# Patient Record
Sex: FEMALE | Race: WHITE | NOT HISPANIC OR LATINO | Employment: UNEMPLOYED | URBAN - METROPOLITAN AREA
[De-identification: names, ages, dates, MRNs, and addresses within clinical notes are randomized per-mention and may not be internally consistent; named-entity substitution may affect disease eponyms.]

---

## 2017-07-17 DIAGNOSIS — R10.2 PELVIC PAIN IN FEMALE: Primary | ICD-10-CM

## 2017-07-18 ENCOUNTER — TELEPHONE (OUTPATIENT)
Dept: OBSTETRICS AND GYNECOLOGY | Facility: CLINIC | Age: 42
End: 2017-07-18

## 2017-07-18 NOTE — TELEPHONE ENCOUNTER
----- Message from Rita Izaguirre sent at 7/17/2017  8:45 AM CDT -----  Contact: [phone]  Please enter order for gyn u/s and schedule appt. Appt is being held for 10 AM on Thursday 07/20.  ----- Message -----  From: Festus Benitez MD  Sent: 7/14/2017   5:06 PM  To: Rita Izaguirre    I def will take her back. Very nice ot but I do not have openings for a while. Can she come in and see Ashly and have u/s with her on a tues when I am at [facility] or Mon or thurs with Socorro when I am in met  ----- Message -----  From: Rita Izaguirre  Sent: 7/11/2017   3:15 PM  To: MD Dr Emmanuel Goldman, pt was discharged from [facility] for non-compliance and would like to come back to see you since she now has insurance that you are in network for ([facility]). Pt's last appt was 04/15/2012 for colpo, pt no-showed for LEEP and repap follow up appts. Pt thinks she has more ovarian cysts because she has been having episodes of abdominal pain that feel like it did when her previous cysts ruptured. Are you able to see her?

## 2017-07-20 ENCOUNTER — OFFICE VISIT (OUTPATIENT)
Dept: OBSTETRICS AND GYNECOLOGY | Facility: CLINIC | Age: 42
End: 2017-07-20
Payer: MEDICARE

## 2017-07-20 VITALS
HEIGHT: 64 IN | BODY MASS INDEX: 25.14 KG/M2 | DIASTOLIC BLOOD PRESSURE: 72 MMHG | WEIGHT: 147.25 LBS | SYSTOLIC BLOOD PRESSURE: 110 MMHG

## 2017-07-20 DIAGNOSIS — R10.2 PELVIC PAIN IN FEMALE: Primary | ICD-10-CM

## 2017-07-20 LAB
CANDIDA RRNA VAG QL PROBE: NEGATIVE
G VAGINALIS RRNA GENITAL QL PROBE: NEGATIVE
T VAGINALIS RRNA GENITAL QL PROBE: NEGATIVE

## 2017-07-20 PROCEDURE — 87480 CANDIDA DNA DIR PROBE: CPT

## 2017-07-20 PROCEDURE — 87660 TRICHOMONAS VAGIN DIR PROBE: CPT

## 2017-07-20 PROCEDURE — 99999 PR PBB SHADOW E&M-EST. PATIENT-LVL III: CPT | Mod: PBBFAC,,, | Performed by: NURSE PRACTITIONER

## 2017-07-20 PROCEDURE — 99213 OFFICE O/P EST LOW 20 MIN: CPT | Mod: S$GLB,,, | Performed by: NURSE PRACTITIONER

## 2017-07-20 RX ORDER — CALCIUM CARBONATE 600 MG
600 TABLET ORAL 2 TIMES DAILY WITH MEALS
COMMUNITY
End: 2018-01-22

## 2017-07-20 RX ORDER — CHOLECALCIFEROL (VITAMIN D3) 25 MCG
1000 TABLET ORAL DAILY
COMMUNITY
End: 2018-01-22

## 2017-07-20 RX ORDER — FLUTICASONE PROPIONATE 50 MCG
1 SPRAY, SUSPENSION (ML) NASAL 2 TIMES DAILY
Refills: 0 | COMMUNITY
Start: 2017-07-13 | End: 2018-01-22

## 2017-07-20 RX ORDER — IBUPROFEN 200 MG
200 TABLET ORAL EVERY 6 HOURS PRN
Status: ON HOLD | COMMUNITY
End: 2018-02-20 | Stop reason: HOSPADM

## 2017-07-20 RX ORDER — MULTIVITAMIN
1 TABLET ORAL DAILY
COMMUNITY

## 2017-07-20 RX ORDER — LEVOTHYROXINE SODIUM 75 UG/1
75 TABLET ORAL DAILY
Refills: 3 | COMMUNITY
Start: 2017-07-14 | End: 2019-10-30

## 2017-07-20 RX ORDER — TIZANIDINE HYDROCHLORIDE 4 MG/1
1 CAPSULE, GELATIN COATED ORAL NIGHTLY
Refills: 2 | COMMUNITY
Start: 2017-05-26 | End: 2018-03-01

## 2017-07-20 RX ORDER — FLUOXETINE 20 MG/1
30 TABLET ORAL DAILY
Refills: 2 | COMMUNITY
Start: 2017-07-14 | End: 2019-10-30

## 2017-07-21 ENCOUNTER — TELEPHONE (OUTPATIENT)
Dept: OBSTETRICS AND GYNECOLOGY | Facility: CLINIC | Age: 42
End: 2017-07-21

## 2017-07-21 NOTE — PROGRESS NOTES
"Call patient and tell them.....    "Your vaginal swab from the office came back negative.  This was a test for a yeast infection or a bacterial infection.  Your swab was normal.  Please call the office if you have any other questions.    "

## 2017-07-21 NOTE — TELEPHONE ENCOUNTER
"----- Message from Socorro Cuba NP sent at 7/21/2017 12:54 PM CDT -----  Call patient and tell them.....    "Your vaginal swab from the office came back negative.  This was a test for a yeast infection or a bacterial infection.  Your swab was normal.  Please call the office if you have any other questions.      "

## 2017-07-21 NOTE — TELEPHONE ENCOUNTER
Spoke with pt and informed her per Socroro Cuba NP;  Vaginal swab came back negative. Pt expressed verbal understanding. JONATHON

## 2017-07-23 NOTE — PROGRESS NOTES
Chief Complaint   Patient presents with    Pelvic Pain     Old/New Pt --  C/o Pelvic Pain  -- Last pap , Negative (Dr. Selin Anderson) (Hx of Leep w/ Dr Benitez )  --  Last MMG , Normal      (Bone pt)  Complaints/Concerns:  Yanique Mcdermott is a 42 y.o. female  presents for c/o Pelvic Pain that has been ongoing for years on/off.  She reports a history of Endometriosis.  She is currently a daily smoker (states 5-7 cigarettes/day).  Since her last visit with , she has seen Dr. Anderson due to her insurance coverage.         Patient's last menstrual period was 2017 (exact date).    Past Medical History:   Diagnosis Date    Abnormal Pap smear of cervix     Hpv (Dr Benitez)    Anemia     Anxiety     Depression     History of kidney stones     HPV (human papilloma virus) infection     LEEP Done 12 (Dr Benitez)    Hx of thyroid nodule     Thyroid disease     Hypo       Past Surgical History:   Procedure Laterality Date    BREAST AUGMENTATION      CERVICAL BIOPSY  W/ LOOP ELECTRODE EXCISION  2012    Dr Benitez    PELVIC LAPAROSCOPY      THYROIDECTOMY, PARTIAL      WISDOM TOOTH EXTRACTION         OB History    Para Term  AB Living   2 2 2     2   SAB TAB Ectopic Multiple Live Births           2      # Outcome Date GA Lbr Burt/2nd Weight Sex Delivery Anes PTL Lv   2 Term 07 38w0d  3.742 kg (8 lb 4 oz) M Vag-Spont EPI  JENNIFER   1 Term 04 38w0d  4.508 kg (9 lb 15 oz) M Vag-Spont EPI  JENNIFER          Family History   Problem Relation Age of Onset    Prostate cancer Paternal Grandfather     Cancer Paternal Grandfather     Breast cancer Maternal Grandmother     Cancer Maternal Grandmother     Alzheimer's disease Maternal Grandmother     Colon cancer Maternal Grandfather     Cancer Maternal Grandfather     Pancreatic cancer Maternal Grandfather     Cancer Mother     Brain cancer Mother     Ovarian cancer Neg Hx        Social  "History   Substance Use Topics    Smoking status: Current Every Day Smoker    Smokeless tobacco: Current User    Alcohol use Yes      Comment: Social       /72   Ht 5' 4" (1.626 m)   Wt 66.8 kg (147 lb 4.3 oz)   LMP 07/01/2017 (Exact Date)   BMI 25.28 kg/m²     ROS:    Review of Systems   Constitutional: Negative for chills and fever.   Gastrointestinal: Negative for nausea and vomiting.   Genitourinary: Positive for pelvic pain. Negative for vaginal bleeding and vaginal pain.       Physical Exam:    Physical Exam:   Constitutional: She is oriented to person, place, and time. She appears well-developed and well-nourished.        Pulmonary/Chest: Effort normal.        Abdominal: Soft. Normal appearance. She exhibits no distension and no mass. There is no tenderness. There is no rigidity, no rebound and no guarding.     Genitourinary: Vagina normal and uterus normal. Pelvic exam was performed with patient supine. There is no rash, tenderness, lesion or injury on the right labia. There is no rash, tenderness, lesion or injury on the left labia. Uterus is not enlarged and not tender. Cervix is normal. Right adnexum displays no mass, no tenderness and no fullness. Left adnexum displays no mass, no tenderness and no fullness. No erythema, tenderness or bleeding in the vagina. No foreign body in the vagina. No signs of injury around the vagina. No vaginal discharge found. Labial bartholins normal.Cervix exhibits no motion tenderness, no discharge and no friability. Also,  no recent pap smear                Neurological: She is oriented to person, place, and time.    Skin: Skin is warm and dry.    Psychiatric: She has a normal mood and affect. Her behavior is normal.       ASSESSMENT AND PLAN  Pelvic pain in female  -     Vaginosis Screen by DNA Probe    U/S report today:  "Nonspecific echogenic foci in the endometrium that may be related to small calcifications. Correlation advised. Followup as clinically " "warranted." (reviewed results with patient)    Patient was counseled today on A.C.S. Pap guidelines and recommendations for yearly pelvic exams, mammograms and monthly self breast exams; to see her PCP for other health maintenance.     Return in about 8 weeks (around 9/14/2017) for Follow-Up/Consult to discuss possible HYST.      "

## 2017-09-27 ENCOUNTER — TELEPHONE (OUTPATIENT)
Dept: OBSTETRICS AND GYNECOLOGY | Facility: CLINIC | Age: 42
End: 2017-09-27

## 2017-09-27 ENCOUNTER — INITIAL CONSULT (OUTPATIENT)
Dept: OBSTETRICS AND GYNECOLOGY | Facility: CLINIC | Age: 42
End: 2017-09-27
Attending: OBSTETRICS & GYNECOLOGY
Payer: MEDICARE

## 2017-09-27 VITALS
BODY MASS INDEX: 25.03 KG/M2 | SYSTOLIC BLOOD PRESSURE: 110 MMHG | WEIGHT: 146.63 LBS | HEIGHT: 64 IN | DIASTOLIC BLOOD PRESSURE: 88 MMHG

## 2017-09-27 DIAGNOSIS — R10.2 PELVIC PAIN IN FEMALE: ICD-10-CM

## 2017-09-27 DIAGNOSIS — N39.3 STRESS INCONTINENCE, FEMALE: ICD-10-CM

## 2017-09-27 DIAGNOSIS — Z11.51 SCREENING FOR HUMAN PAPILLOMAVIRUS: Primary | ICD-10-CM

## 2017-09-27 DIAGNOSIS — Z12.39 BREAST CANCER SCREENING: ICD-10-CM

## 2017-09-27 DIAGNOSIS — Z01.419 WELL FEMALE EXAM WITH ROUTINE GYNECOLOGICAL EXAM: ICD-10-CM

## 2017-09-27 DIAGNOSIS — F17.200 SMOKER: ICD-10-CM

## 2017-09-27 DIAGNOSIS — B97.7 HPV (HUMAN PAPILLOMA VIRUS) INFECTION: ICD-10-CM

## 2017-09-27 DIAGNOSIS — Z01.419 ENCOUNTER FOR GYNECOLOGICAL EXAMINATION: ICD-10-CM

## 2017-09-27 PROCEDURE — 87624 HPV HI-RISK TYP POOLED RSLT: CPT

## 2017-09-27 PROCEDURE — 99396 PREV VISIT EST AGE 40-64: CPT | Mod: S$GLB,,, | Performed by: OBSTETRICS & GYNECOLOGY

## 2017-09-27 PROCEDURE — 99999 PR PBB SHADOW E&M-EST. PATIENT-LVL III: CPT | Mod: PBBFAC,,, | Performed by: OBSTETRICS & GYNECOLOGY

## 2017-09-27 PROCEDURE — 88175 CYTOPATH C/V AUTO FLUID REDO: CPT

## 2017-09-27 NOTE — PROGRESS NOTES
CC: Well woman exam    Yanique Mcdermott is a 42 y.o. female  presents for a well woman exam.  She is established.  LMP: Patient's last menstrual period was 2017..   Last pap 2 years ago with .   c/o pelvic pain, extreme abdominal pain.  Periods are becoming more painful and lasting 7 days.     Patient is a 42-year-old  with normal monthly cycles every 26-30 days.  Lasting 4-6 days.  She does report dysmenorrhea with her cycle every month.  Patient with a history of endometriosis and had been doing well until the past year.  Over the past year that only she having pain with her cycles but she also has pain randomly throughout the month.  The pain usually starts in the back and radiates to the front does not seem to be related to her cycle at all.  It comes in waves and usually lasts approximately an hour.  Approximately 1-1/2 months ago she went to [facility] as the pain was so severe.  CAT scan was negative.  Lab work and urine culture was negative at that time.  She also does have a history of kidney stones her last episode was 5 years ago.  Patient is currently not on any any contraceptives secondary to her smoking status.  Patient reports that she again had another episode of pain which was significant month and came to see us here in the clinic and had a transvaginal ultrasound which was negative.  She states the pain feels like she is having recurrent cysts and like her old endometriosis pain.  She denies any GI symptoms no constipation no diarrhea no other bowel or gas pains.   she does report stress urinary incontinence and occasional urinary frequency however this is been going on for years.     Health Maintenance   Topic Date Due    Lipid Panel  1973    TETANUS VACCINE  1991    Pneumococcal PPSV23 (Medium Risk) (1) 1991    Pap Smear with HPV Cotest  1994    Mammogram  2013    Influenza Vaccine  2017         Past Medical History:   Diagnosis  "Date    Abnormal Pap smear of cervix 2010    Hpv (Dr Benitez)    Anemia     Anxiety     Depression     History of kidney stones     HPV (human papilloma virus) infection 2010    LEEP Done 12 (Dr Benitez)    Hx of thyroid nodule     Thyroid disease     Hypo       Past Surgical History:   Procedure Laterality Date    BREAST AUGMENTATION      CERVICAL BIOPSY  W/ LOOP ELECTRODE EXCISION  2012    Dr Benitez    PELVIC LAPAROSCOPY  2000    THYROIDECTOMY, PARTIAL      WISDOM TOOTH EXTRACTION         OB History    Para Term  AB Living   2 2 2     2   SAB TAB Ectopic Multiple Live Births           2      # Outcome Date GA Lbr Burt/2nd Weight Sex Delivery Anes PTL Lv   2 Term 07 38w0d  3.742 kg (8 lb 4 oz) M Vag-Spont EPI  JENNIFER   1 Term 04 38w0d  4.508 kg (9 lb 15 oz) M Vag-Spont EPI  JENNIFER          Family History   Problem Relation Age of Onset    Prostate cancer Paternal Grandfather     Cancer Paternal Grandfather     Breast cancer Maternal Grandmother     Cancer Maternal Grandmother     Alzheimer's disease Maternal Grandmother     Colon cancer Maternal Grandfather     Cancer Maternal Grandfather     Pancreatic cancer Maternal Grandfather     Cancer Mother     Brain cancer Mother     Ovarian cancer Neg Hx        Social History   Substance Use Topics    Smoking status: Current Every Day Smoker    Smokeless tobacco: Current User    Alcohol use Yes      Comment: Social       /88   Ht 5' 4" (1.626 m)   Wt 66.5 kg (146 lb 9.7 oz)   LMP 2017   BMI 25.16 kg/m²       ROS:  GENERAL: Denies weight gain or weight loss. Feeling well overall.   SKIN: Denies rash or lesions.   HEAD: Denies head injury or headache.   NODES: Denies enlarged lymph nodes.   CHEST: Denies chest pain or shortness of breath.   CARDIOVASCULAR: Denies palpitations or left sided chest pain.   ABDOMEN: No abdominal pain, constipation, diarrhea, nausea, vomiting or rectal bleeding. "   URINARY: No frequency, dysuria, hematuria, or burning on urination.  REPRODUCTIVE: See HPI.   BREASTS: The patient performs breast self-examination and denies pain, lumps, or nipple discharge.   HEMATOLOGIC: No easy bruisability or excessive bleeding.  MUSCULOSKELETAL: Denies joint pain or swelling.   NEUROLOGIC: Denies syncope or weakness.   PSYCHIATRIC: Denies depression, anxiety or mood swings.    Physical Exam:    APPEARANCE: Well nourished, well developed, in no acute distress.  AFFECT: WNL, alert and oriented x 3  SKIN: No acne or hirsutism  ABDOMEN: Soft.  No tenderness or masses.  No hepatosplenomegaly.  No hernias.  BREASTS: Symmetrical, no skin changes or visible lesions.  No palpable masses, nipple discharge bilaterally.  PELVIC: Normal external genitalia without lesions.  Normal hair distribution.  Adequate perineal body, normal urethral meatus.  Vagina moist and well rugated without lesions or discharge.  Cervix pink, without lesions, discharge or tenderness.  No significant cystocele or rectocele.  Bimanual exam shows uterus to be normal size, regular, mobile and nontender.  Adnexa without masses or tenderness.    EXTREMITIES: No edema.    ASSESSMENT AND PLAN  1. Screening for human papillomavirus  Liquid-based pap smear, screening    HPV High Risk Genotypes, PCR   2. Well female exam with routine gynecological exam  Liquid-based pap smear, screening    HPV High Risk Genotypes, PCR   3. HPV (human papilloma virus) infection  Liquid-based pap smear, screening    HPV High Risk Genotypes, PCR   4. Encounter for gynecological examination   Pap smear performed today.     5. Stress incontinence, female  Ambulatory Referral to Urogynecology  Referred to Dr. Dr. Boykin for  stress urinary incontinence evaluation    6. Breast cancer screening  Mammo Digital Screening Bilat with Tomosynthesis CAD   7. Smoker   referred to smoking cessation clinic    8. Pelvic pain in female   long discussion with patient.   Patient with a history of endometriosis.  Reports that the pain has gotten worse over the past year and is not only having pain with her cycle but pain randomly throughout the month.  Patient not a candidate for trial of oral contraceptives.  Pelvic ultrasound performed and was normal as well as a CT scan of abdomen and pelvis was also normal.  Likelihood of recurrent endometriosis is high.  Discussed pros and cons and patient would like to proceed with diagnostic laparoscopy.  Urine culture was previously negative at  as well as CAT scan and labs were 2..         Patient was counseled today on A.C.S. Pap guidelines and recommendations for yearly pelvic exams, mammograms and monthly self breast exams; to see her PCP for other health maintenance.     Return for Pre-op appointment.

## 2017-09-27 NOTE — TELEPHONE ENCOUNTER
Patient is interested in proceeding with laparoscopy.  Diagnoses pelvic pain and history of endometriosis.

## 2017-10-02 LAB
HPV HR 12 DNA CVX QL NAA+PROBE: NEGATIVE
HPV16 DNA SPEC QL NAA+PROBE: NEGATIVE
HPV18 DNA SPEC QL NAA+PROBE: NEGATIVE

## 2017-10-06 ENCOUNTER — TELEPHONE (OUTPATIENT)
Dept: OBSTETRICS AND GYNECOLOGY | Facility: CLINIC | Age: 42
End: 2017-10-06

## 2017-10-06 NOTE — TELEPHONE ENCOUNTER
Called pt. To tell her pap & hpv negative. Patient waiting for Tia to call her about scheduling surgery. I told her she should call her by Monday

## 2017-11-14 ENCOUNTER — TELEPHONE (OUTPATIENT)
Dept: OBSTETRICS AND GYNECOLOGY | Facility: CLINIC | Age: 42
End: 2017-11-14

## 2017-11-14 DIAGNOSIS — Z87.42 HISTORY OF ENDOMETRIOSIS: ICD-10-CM

## 2017-11-14 DIAGNOSIS — R10.2 PELVIC PAIN IN FEMALE: Primary | ICD-10-CM

## 2017-12-04 ENCOUNTER — TELEPHONE (OUTPATIENT)
Dept: OBSTETRICS AND GYNECOLOGY | Facility: CLINIC | Age: 42
End: 2017-12-04

## 2017-12-04 NOTE — TELEPHONE ENCOUNTER
Pt was calling to speak to you, she does not know where she put the codes you gave her for her surgery.

## 2017-12-05 NOTE — TELEPHONE ENCOUNTER
Samina. Pt has questions about recovery time, can she have procedure if she is on her period and no one from uro gyn has called her for an appointment.

## 2017-12-07 ENCOUNTER — TELEPHONE (OUTPATIENT)
Dept: OBSTETRICS AND GYNECOLOGY | Facility: CLINIC | Age: 42
End: 2017-12-07

## 2017-12-07 NOTE — TELEPHONE ENCOUNTER
Pt. Cancelled her surgery for this month since she has not had frequent pains like she was.   Also worried about going under general anesthesia. States she knows she needs this in order to move forward but wants to r/s for January.   Has not seen  yet.

## 2018-01-20 ENCOUNTER — NURSE TRIAGE (OUTPATIENT)
Dept: ADMINISTRATIVE | Facility: CLINIC | Age: 43
End: 2018-01-20

## 2018-01-20 DIAGNOSIS — R10.2 PELVIC PAIN IN FEMALE: Primary | ICD-10-CM

## 2018-01-20 NOTE — TELEPHONE ENCOUNTER
Due to patients description of her pain,fainting,loss of sensation I used [employer] I asked patient to dial 911.

## 2018-01-20 NOTE — TELEPHONE ENCOUNTER
Reason for Disposition   Passed out (i.e., lost consciousness, collapsed and was not responding)    Protocols used: ST ABDOMINAL PAIN - FEMALE-A-AH

## 2018-01-22 ENCOUNTER — OFFICE VISIT (OUTPATIENT)
Dept: OBSTETRICS AND GYNECOLOGY | Facility: CLINIC | Age: 43
End: 2018-01-22
Payer: MEDICARE

## 2018-01-22 ENCOUNTER — TELEPHONE (OUTPATIENT)
Dept: OBSTETRICS AND GYNECOLOGY | Facility: CLINIC | Age: 43
End: 2018-01-22

## 2018-01-22 VITALS
DIASTOLIC BLOOD PRESSURE: 80 MMHG | HEIGHT: 64 IN | SYSTOLIC BLOOD PRESSURE: 110 MMHG | BODY MASS INDEX: 24.74 KG/M2 | WEIGHT: 144.94 LBS

## 2018-01-22 DIAGNOSIS — R10.30 LOWER ABDOMINAL PAIN: Primary | ICD-10-CM

## 2018-01-22 PROCEDURE — 99999 PR PBB SHADOW E&M-EST. PATIENT-LVL III: CPT | Mod: PBBFAC,,, | Performed by: OBSTETRICS & GYNECOLOGY

## 2018-01-22 PROCEDURE — 99214 OFFICE O/P EST MOD 30 MIN: CPT | Mod: S$GLB,,, | Performed by: OBSTETRICS & GYNECOLOGY

## 2018-01-22 RX ORDER — ERGOCALCIFEROL 1.25 MG/1
CAPSULE ORAL
Refills: 3 | COMMUNITY
Start: 2017-10-19 | End: 2021-03-11

## 2018-01-22 RX ORDER — IBUPROFEN 200 MG
TABLET ORAL
Refills: 3 | COMMUNITY
Start: 2017-12-07 | End: 2019-01-03

## 2018-01-22 RX ORDER — ECONAZOLE NITRATE 10 MG/G
CREAM TOPICAL
Refills: 3 | COMMUNITY
Start: 2017-11-06 | End: 2019-01-03

## 2018-01-22 RX ORDER — CLONAZEPAM 0.5 MG/1
0.5 TABLET ORAL DAILY PRN
Refills: 0 | COMMUNITY
Start: 2017-10-19 | End: 2019-01-03

## 2018-01-22 NOTE — TELEPHONE ENCOUNTER
Please call patient and schedule/reschedule her laparoscopy.  Diagnoses chronic pelvic pain with a history of endometriosis  Please put Van warmer or Schex to assist  Anesthesia Gen.

## 2018-01-22 NOTE — PROGRESS NOTES
CC: Acute episode of abdominal pain on Saturday.    Yanique Mcdermott is a 42 y.o. female    Patient with a history of endometriosis was scheduled for surgery last month but canceled secondary to the pain had improved significantly.  2 days ago patient went to the pharmacy and while she was sitting in the car experienced an episode of acute abdominal pain across her entire lower abdomen.  The pain was so intense and so severe that it made her pass out for a few moments.  Last menstrual period was on .  Patient called the nurse on call and the pain had been subsided.  She was recommended to go to the emergency room but the patient did not go.   We called to check on patient this morning when we got the message and instructed her to come in today for an ultrasound.  Ultrasound today was essentially normal with a small echogenic focus 4 mm and it in the endometrium which could represent a small fibroid.  Both ovaries were normal bilaterally with no evidence of cyst and no free fluid.  Endometrial stripe 5 mm.      She is established.   Again we had a long discussion today regarding surgery.  I am unclear the source of the pain as this would be an atypical presentation for endometriosis.  The patient does not have daily or throbbing pain.  She does not tend to have pain while she is on her period but the pain tends to occur days after her period and the attack is acute and intense and short.  This is the same pain that she had years ago when she was diagnosed with endometriosis by another physician in .  Again I stated that the only way to tell for sure is to proceed with another laparoscopy.  Patient agreeable.  I also stated that I'm concerned that because of the atypical nature of this pain that we might be missing something else that could be causing the pain.    Past Medical History:   Diagnosis Date    Abnormal Pap smear of cervix     Hpv (Dr Benitez)    Anemia     Anxiety      "Depression     History of kidney stones     HPV (human papilloma virus) infection 2010    LEEP Done 12 (Dr Benitez)    Hx of thyroid nodule     Pelvic pain     Thyroid disease     Hypo    Tobacco use        Past Surgical History:   Procedure Laterality Date    BREAST AUGMENTATION      CERVICAL BIOPSY  W/ LOOP ELECTRODE EXCISION  2012    Dr Benitez    PELVIC LAPAROSCOPY      THYROIDECTOMY, PARTIAL      WISDOM TOOTH EXTRACTION         OB History    Para Term  AB Living   2 2 2     2   SAB TAB Ectopic Multiple Live Births           2      # Outcome Date GA Lbr Burt/2nd Weight Sex Delivery Anes PTL Lv   2 Term 07 38w0d  3.742 kg (8 lb 4 oz) M Vag-Spont EPI  JENNIFER   1 Term 04 38w0d  4.508 kg (9 lb 15 oz) M Vag-Spont EPI  JENNIFER      Obstetric Comments   Menarche 12       Family History   Problem Relation Age of Onset    Prostate cancer Paternal Grandfather     Cancer Paternal Grandfather     Breast cancer Maternal Grandmother     Cancer Maternal Grandmother     Alzheimer's disease Maternal Grandmother     Colon cancer Maternal Grandfather     Cancer Maternal Grandfather     Pancreatic cancer Maternal Grandfather     Cancer Mother     Brain cancer Mother     Ovarian cancer Neg Hx        Social History   Substance Use Topics    Smoking status: Current Every Day Smoker     Types: Cigarettes    Smokeless tobacco: Never Used    Alcohol use Yes      Comment: Social       /80   Ht 5' 4" (1.626 m)   Wt 65.8 kg (144 lb 15.2 oz)   LMP 2018 (Exact Date)   BMI 24.88 kg/m²     ROS:  GENERAL: Denies weight gain or weight loss. Feeling well overall.   SKIN: Denies rash or lesions.   HEAD: Denies head injury or headache.   NODES: Denies enlarged lymph nodes.   CHEST: Denies chest pain or shortness of breath.   CARDIOVASCULAR: Denies palpitations or left sided chest pain.   ABDOMEN: No constipation, diarrhea, nausea, vomiting or rectal bleeding. "   URINARY: No frequency, dysuria, hematuria, or burning on urination.  REPRODUCTIVE: See HPI.   BREASTS: denies pain, lumps, or nipple discharge.   HEMATOLOGIC: No easy bruisability or excessive bleeding.  MUSCULOSKELETAL: Denies joint pain or swelling.   NEUROLOGIC: Denies syncope or weakness.   PSYCHIATRIC: Denies depression, anxiety or mood swings.    Physical Exam:    APPEARANCE: Well nourished, well developed, in no acute distress.  AFFECT: WNL, alert and oriented x 3  SKIN: No acne or hirsutism  NECK: Neck symmetric without masses or thyromegaly  NODES: No inguinal, cervical, axillary, or femoral lymph node enlargement  CHEST: Good respiratory effect  ABDOMEN: Soft.  No tenderness or masses.  No hepatosplenomegaly.  No hernias.  PELVIC: Normal external genitalia without lesions.  Normal hair distribution.  Adequate perineal body, normal urethral meatus.  Vagina moist and well rugated without lesions or discharge.  Cervix pink, without lesions, discharge or tenderness.  No significant cystocele or rectocele.  Bimanual exam shows uterus to be normal size, regular, mobile and nontender.  Adnexa without masses or tenderness.    EXTREMITIES: No edema.    ASSESSMENT AND PLAN    Yanique was seen today for pelvic pain.    Diagnoses and all orders for this visit:    Lower abdominal pain   Ideology unclear.  Will proceed with rescheduling laparoscopy surgery.      No Follow-up on file.

## 2018-01-22 NOTE — TELEPHONE ENCOUNTER
Pt states she feels like she has a cyst that ruptured.  She went to the pharmacy and passed out in her car due to the pain. Also reports a tingling in arms and hands after.  She just got off her period but when she wiped after using the bathroom she had a scant amount of blood.  She isn't sure if its due to her period or the suspected ruptured cyst.  Today she is still having discomfort and swelling, using a heating pad and Motrin for comfort.       She did not go to the ER.      She wants to be seen today.     Do you want an US?

## 2018-01-26 ENCOUNTER — TELEPHONE (OUTPATIENT)
Dept: OBSTETRICS AND GYNECOLOGY | Facility: CLINIC | Age: 43
End: 2018-01-26

## 2018-01-26 DIAGNOSIS — R10.2 FEMALE PELVIC PAIN: Primary | ICD-10-CM

## 2018-01-26 DIAGNOSIS — Z87.42 HISTORY OF ENDOMETRIOSIS: ICD-10-CM

## 2018-02-07 ENCOUNTER — OFFICE VISIT (OUTPATIENT)
Dept: OBSTETRICS AND GYNECOLOGY | Facility: CLINIC | Age: 43
End: 2018-02-07
Attending: OBSTETRICS & GYNECOLOGY
Payer: MEDICARE

## 2018-02-07 ENCOUNTER — ANESTHESIA EVENT (OUTPATIENT)
Dept: SURGERY | Facility: OTHER | Age: 43
End: 2018-02-07
Payer: MEDICARE

## 2018-02-07 ENCOUNTER — HOSPITAL ENCOUNTER (OUTPATIENT)
Dept: PREADMISSION TESTING | Facility: OTHER | Age: 43
Discharge: HOME | End: 2018-02-07
Attending: OBSTETRICS & GYNECOLOGY
Payer: MEDICARE

## 2018-02-07 VITALS
BODY MASS INDEX: 24.41 KG/M2 | DIASTOLIC BLOOD PRESSURE: 65 MMHG | HEART RATE: 84 BPM | SYSTOLIC BLOOD PRESSURE: 109 MMHG | OXYGEN SATURATION: 97 % | TEMPERATURE: 99 F | HEIGHT: 64 IN | WEIGHT: 143 LBS

## 2018-02-07 VITALS — HEIGHT: 64 IN | BODY MASS INDEX: 24.46 KG/M2 | WEIGHT: 143.31 LBS

## 2018-02-07 DIAGNOSIS — Z01.818 PRE-OP EXAMINATION: ICD-10-CM

## 2018-02-07 DIAGNOSIS — R10.2 PELVIC PAIN: Primary | ICD-10-CM

## 2018-02-07 LAB
BASOPHILS # BLD AUTO: 0.02 K/UL
BASOPHILS NFR BLD: 0.4 %
DIFFERENTIAL METHOD: NORMAL
EOSINOPHIL # BLD AUTO: 0.2 K/UL
EOSINOPHIL NFR BLD: 3.5 %
ERYTHROCYTE [DISTWIDTH] IN BLOOD BY AUTOMATED COUNT: 13.5 %
HCT VFR BLD AUTO: 37.2 %
HGB BLD-MCNC: 12.5 G/DL
LYMPHOCYTES # BLD AUTO: 1.8 K/UL
LYMPHOCYTES NFR BLD: 32.6 %
MCH RBC QN AUTO: 30.2 PG
MCHC RBC AUTO-ENTMCNC: 33.6 G/DL
MCV RBC AUTO: 90 FL
MONOCYTES # BLD AUTO: 0.4 K/UL
MONOCYTES NFR BLD: 7.1 %
NEUTROPHILS # BLD AUTO: 3.2 K/UL
NEUTROPHILS NFR BLD: 56.2 %
PLATELET # BLD AUTO: 324 K/UL
PMV BLD AUTO: 9.3 FL
RBC # BLD AUTO: 4.14 M/UL
WBC # BLD AUTO: 5.64 K/UL

## 2018-02-07 PROCEDURE — 36415 COLL VENOUS BLD VENIPUNCTURE: CPT

## 2018-02-07 PROCEDURE — 85025 COMPLETE CBC W/AUTO DIFF WBC: CPT

## 2018-02-07 PROCEDURE — 99499 UNLISTED E&M SERVICE: CPT | Mod: S$GLB,,, | Performed by: OBSTETRICS & GYNECOLOGY

## 2018-02-07 PROCEDURE — 99999 PR PBB SHADOW E&M-EST. PATIENT-LVL III: CPT | Mod: PBBFAC,,, | Performed by: OBSTETRICS & GYNECOLOGY

## 2018-02-07 RX ORDER — ALBUTEROL SULFATE 0.83 MG/ML
2.5 SOLUTION RESPIRATORY (INHALATION)
Status: CANCELLED | OUTPATIENT
Start: 2018-02-07 | End: 2018-02-07

## 2018-02-07 RX ORDER — MIDAZOLAM HYDROCHLORIDE 1 MG/ML
5 INJECTION INTRAMUSCULAR; INTRAVENOUS
Status: DISCONTINUED | OUTPATIENT
Start: 2018-02-20 | End: 2018-02-08 | Stop reason: HOSPADM

## 2018-02-07 RX ORDER — LIDOCAINE HYDROCHLORIDE 10 MG/ML
0.5 INJECTION, SOLUTION EPIDURAL; INFILTRATION; INTRACAUDAL; PERINEURAL ONCE
Status: CANCELLED | OUTPATIENT
Start: 2018-02-07 | End: 2018-02-07

## 2018-02-07 RX ORDER — SCOLOPAMINE TRANSDERMAL SYSTEM 1 MG/1
1 PATCH, EXTENDED RELEASE TRANSDERMAL
Status: CANCELLED | OUTPATIENT
Start: 2018-02-07

## 2018-02-07 RX ORDER — OXYCODONE AND ACETAMINOPHEN 5; 325 MG/1; MG/1
1 TABLET ORAL EVERY 4 HOURS PRN
Qty: 10 TABLET | Refills: 0 | Status: SHIPPED | OUTPATIENT
Start: 2018-02-07 | End: 2018-03-01

## 2018-02-07 RX ORDER — IBUPROFEN 800 MG/1
800 TABLET ORAL EVERY 8 HOURS PRN
Qty: 20 TABLET | Refills: 0 | Status: SHIPPED | OUTPATIENT
Start: 2018-02-07 | End: 2022-09-01

## 2018-02-07 RX ORDER — SODIUM CHLORIDE, SODIUM LACTATE, POTASSIUM CHLORIDE, CALCIUM CHLORIDE 600; 310; 30; 20 MG/100ML; MG/100ML; MG/100ML; MG/100ML
INJECTION, SOLUTION INTRAVENOUS CONTINUOUS
Status: CANCELLED | OUTPATIENT
Start: 2018-02-07

## 2018-02-07 NOTE — DISCHARGE INSTRUCTIONS
PRE-ADMIT TESTING -  [phone]    [address]  [facility]          Your surgery has been scheduled at [facility]. We are pleased to have the opportunity to serve you. For Further Information please call [phone].    On the day of surgery please report to the Information Desk on the 1st floor.    · CONTACT YOUR PHYSICIAN'S OFFICE THE DAY PRIOR TO YOUR SURGERY TO OBTAIN YOUR ARRIVAL TIME.     · The evening before surgery do not eat anything after 9 p.m. ( this includes hard candy, chewing gum and mints).  You may only have GATORADE, POWERADE AND WATER  from 9 p.m. until you leave your home.   DO NOT DRINK ANY LIQUIDS ON THE WAY TO THE HOSPITAL.      SPECIAL MEDICATION INSTRUCTIONS: TAKE medications checked off by the Anesthesiologist on your Medication List.    Angiogram Patients: Take medications as instructed by your physician, including aspirin.     Surgery Patients:    If you take ASPIRIN - Your PHYSICIAN/SURGEON will need to inform you IF/OR when you need to stop taking aspirin prior to your surgery.     Do Not take any medications containing IBUPROFEN.  Do Not Wear any make-up or dark nail polish   (especially eye make-up) to surgery. If you come to surgery with makeup on you will be required to remove the makeup or nail polish.    Do not shave your surgical area at least 5 days prior to your surgery. The surgical prep will be performed at the hospital according to Infection Control regulations.    Leave all valuables at home.   Do Not wear any jewelry or watches, including any metal in body piercings.  Contact Lens must be removed before surgery. Either do not wear the contact lens or bring a case and solution for storage.  Please bring a container for eyeglasses or dentures as required.  Bring any paperwork your physician has provided, such as consent forms,  history and physicals, doctor's orders, etc.   Bring comfortable clothes that are loose fitting to wear upon  discharge. Take into consideration the type of surgery being performed.  Maintain your diet as advised per your physician the day prior to surgery.      Adequate rest the night before surgery is advised.   Park in the Parking lot behind the hospital or in the [place] [facility] across the street from the parking lot. Parking is complimentary.  If you will be discharged the same day as your procedure, please arrange for a responsible adult to drive you home or to accompany you if traveling by taxi.   YOU WILL NOT BE PERMITTED TO DRIVE OR TO LEAVE THE HOSPITAL ALONE AFTER SURGERY.   It is strongly recommended that you arrange for someone to remain with you for the first 24 hrs following your surgery.       Thank you for your cooperation.  The Staff of [facility].        Bathing Instructions                                                                 Please shower the evening before and morning of your procedure with    ANTIBACTERIAL SOAP. ( DIAL, etc )  Concentrate on the surgical area   for at least 3 minutes and rinse completely. Dry off as usual.   Do not use any deodorant, powder, body lotions, perfume, after shave or    cologne.

## 2018-02-07 NOTE — PROGRESS NOTES
CC:Pelvic pain     Yanique Tremayne Mcdermott is a 42 y.o. female  here for preop     Patient with a history of endometriosis was scheduled for surgery last month but canceled secondary to the pain had improved significantly.  2 days ago patient went to the pharmacy and while she was sitting in the car experienced an episode of acute abdominal pain across her entire lower abdomen.  The pain was so intense and so severe that it made her pass out for a few moments.  Last menstrual period 2018 .  Patient called the nurse on call and the pain had been subsided.    She was recommended to go to the emergency room but the patient did not go.   We called to check on patient this morning when we got the message and instructed her to come in today for an ultrasound.  Ultrasound today was essentially normal with a small echogenic focus 4 mm and it in the endometrium which could represent a small fibroid.  Both ovaries were normal bilaterally with no evidence of cyst and no free fluid.  Endometrial stripe 5 mm.      Again we had a long discussion today regarding surgery.  I am unclear the source of the pain as this would be an atypical presentation for endometriosis.  The patient does not have daily or throbbing pain.  She does not tend to have pain while she is on her period but the pain tends to occur days after her period and the attack is acute and intense and short.  This is the same pain that she had years ago when she was diagnosed with endometriosis by another physician in .  Again I stated that the only way to tell for sure is to proceed with another laparoscopy.  Patient agreeable.  I also stated that I'm concerned that because of the atypical nature of this pain that we might be missing something else that could be causing the pain.          Past Medical History:   Diagnosis Date    Abnormal Pap smear of cervix      Hpv (Dr Benitez)    Anemia      Anxiety      Depression      History of kidney  stones      HPV (human papilloma virus) infection      LEEP Done 12 (Dr Benitez)    Hx of thyroid nodule     Pelvic pain      Thyroid disease       Hypo    Tobacco use                 Past Surgical History:   Procedure Laterality Date    BREAST AUGMENTATION       CERVICAL BIOPSY  W/ LOOP ELECTRODE EXCISION   2012     Dr Benitez    PELVIC LAPAROSCOPY   2000    THYROIDECTOMY, PARTIAL       WISDOM TOOTH EXTRACTION                             OB History    Para Term  AB Living   2 2 2     2   SAB TAB Ectopic Multiple Live Births           2       # Outcome Date GA Lbr Burt/2nd Weight Sex Delivery Anes PTL Lv   2 Term 07 38w0d   3.742 kg (8 lb 4 oz) M Vag-Spont EPI   JENNIFER   1 Term 04 38w0d   4.508 kg (9 lb 15 oz) M Vag-Spont EPI   JENNIFER       Obstetric Comments   Menarche 12               Family History   Problem Relation Age of Onset    Prostate cancer Paternal Grandfather      Cancer Paternal Grandfather      Breast cancer Maternal Grandmother      Cancer Maternal Grandmother      Alzheimer's disease Maternal Grandmother      Colon cancer Maternal Grandfather      Cancer Maternal Grandfather      Pancreatic cancer Maternal Grandfather      Cancer Mother      Brain cancer Mother      Ovarian cancer Neg Hx                   Social History   Substance Use Topics    Smoking status: Current Every Day Smoker       Types: Cigarettes    Smokeless tobacco: Never Used    Alcohol use Yes          Comment: Social           ROS:  GENERAL: Denies weight gain or weight loss. Feeling well overall.   SKIN: Denies rash or lesions.   HEAD: Denies head injury or headache.   NODES: Denies enlarged lymph nodes.   CHEST: Denies chest pain or shortness of breath.   CARDIOVASCULAR: Denies palpitations or left sided chest pain.   ABDOMEN: No constipation, diarrhea, nausea, vomiting or rectal bleeding.   URINARY: No frequency, dysuria, hematuria, or burning on  urination.       Physical Exam:     APPEARANCE: Well nourished, well developed, in no acute distress.  AFFECT: WNL, alert and oriented x 3  [facility] RRR  Lungs CTAB  ABDOMEN: Soft.  No tenderness or masses.  Small umb hernia  PELVIC:deferred  EXTREMITIES: No edema.     ASSESSMENT AND PLAN     Yanique was seen today for pelvic pain.     Diagnoses and all orders for this visit:     Lower abdominal pain              Ideology unclear.  Will proceed with scope   Risks and benefits explained in detail to patient, including but not limited to damage to internal organs, including but not limited to bowel, bladder, uterus, tubes, ovaries, nerves, arteries, veins, possible need for blood transfusion, possible need for hysterectomy. Patient is aware of all risks and desires laparoscopy. All questions answered. Consents signed.

## 2018-02-07 NOTE — ANESTHESIA PREPROCEDURE EVALUATION
02/07/2018  Yanique Mcdermott is a 42 y.o., female.    Anesthesia Evaluation    I have reviewed the Patient Summary Reports.    I have reviewed the Nursing Notes.   I have reviewed the Medications.     Review of Systems  Anesthesia Hx:  Denies Family Hx of Anesthesia complications.  Personal Hx of Anesthesia complications, Post-Operative Nausea/Vomiting   Social:  Smoker    Hematology/Oncology:         -- Anemia:   Cardiovascular:  Cardiovascular Normal Exercise tolerance: good     Pulmonary:   Recent allergic symptoms of nasal stuffiness and fullness in ears, no fever or productive cough   Renal/:   renal calculi    Neurological:  Neurology Normal    Endocrine:   Hypothyroidism    Psych:   anxiety depression          Physical Exam  General:  Well nourished    Airway/Jaw/Neck:  Airway Findings: Mouth Opening: Normal Tongue: Normal  General Airway Assessment: Adult  Mallampati: II  TM Distance: Normal, at least 6 cm      Dental:  Dental Findings: In tact        Mental Status:  Mental Status Findings:  Alert and Oriented, Cooperative         Anesthesia Plan  Type of Anesthesia, risks & benefits discussed:  Anesthesia Type:  general  Patient's Preference:   Intra-op Monitoring Plan: standard ASA monitors  Intra-op Monitoring Plan Comments:   Post Op Pain Control Plan: multimodal analgesia  Post Op Pain Control Plan Comments:   Induction:   IV  Beta Blocker:         Informed Consent: Patient understands risks and agrees with Anesthesia plan.  Questions answered. Anesthesia consent signed with patient.  ASA Score: 2     Day of Surgery Review of History & Physical:    H&P update referred to the surgeon.         Ready For Surgery From Anesthesia Perspective.

## 2018-02-07 NOTE — H&P
CC:Pelvic pain     Yanique Tremayne Mcdermott is a 42 y.o. female  here for preop      Patient with a history of endometriosis was scheduled for surgery last month but canceled secondary to the pain had improved significantly.  2 days ago patient went to the pharmacy and while she was sitting in the car experienced an episode of acute abdominal pain across her entire lower abdomen.  The pain was so intense and so severe that it made her pass out for a few moments.  Last menstrual period 2018 .  Patient called the nurse on call and the pain had been subsided.     She was recommended to go to the emergency room but the patient did not go.   We called to check on patient this morning when we got the message and instructed her to come in today for an ultrasound.  Ultrasound today was essentially normal with a small echogenic focus 4 mm and it in the endometrium which could represent a small fibroid.  Both ovaries were normal bilaterally with no evidence of cyst and no free fluid.  Endometrial stripe 5 mm.      Again we had a long discussion today regarding surgery.  I am unclear the source of the pain as this would be an atypical presentation for endometriosis.  The patient does not have daily or throbbing pain.  She does not tend to have pain while she is on her period but the pain tends to occur days after her period and the attack is acute and intense and short.  This is the same pain that she had years ago when she was diagnosed with endometriosis by another physician in .  Again I stated that the only way to tell for sure is to proceed with another laparoscopy.  Patient agreeable.  I also stated that I'm concerned that because of the atypical nature of this pain that we might be missing something else that could be causing the pain.             Past Medical History:   Diagnosis Date    Abnormal Pap smear of cervix      Hpv (Dr Benitez)    Anemia      Anxiety      Depression      History of  kidney stones      HPV (human papilloma virus) infection      LEEP Done 12 (Dr Benitez)    Hx of thyroid nodule     Pelvic pain      Thyroid disease       Hypo    Tobacco use                     Past Surgical History:   Procedure Laterality Date    BREAST AUGMENTATION       CERVICAL BIOPSY  W/ LOOP ELECTRODE EXCISION   2012     Dr Benitez    PELVIC LAPAROSCOPY       THYROIDECTOMY, PARTIAL       WISDOM TOOTH EXTRACTION                                            OB History    Para Term  AB Living   2 2 2     2   SAB TAB Ectopic Multiple Live Births           2       # Outcome Date GA Lbr Burt/2nd Weight Sex Delivery Anes PTL Lv   2 Term 07 38w0d   3.742 kg (8 lb 4 oz) M Vag-Spont EPI   JENNIFER   1 Term 04 38w0d   4.508 kg (9 lb 15 oz) M Vag-Spont EPI   JENNIFER       Obstetric Comments   Menarche 12                   Family History   Problem Relation Age of Onset    Prostate cancer Paternal Grandfather      Cancer Paternal Grandfather      Breast cancer Maternal Grandmother      Cancer Maternal Grandmother      Alzheimer's disease Maternal Grandmother      Colon cancer Maternal Grandfather      Cancer Maternal Grandfather      Pancreatic cancer Maternal Grandfather      Cancer Mother      Brain cancer Mother      Ovarian cancer Neg Hx                         Social History   Substance Use Topics    Smoking status: Current Every Day Smoker       Types: Cigarettes    Smokeless tobacco: Never Used    Alcohol use Yes           Comment: Social            ROS:  GENERAL: Denies weight gain or weight loss. Feeling well overall.   SKIN: Denies rash or lesions.   HEAD: Denies head injury or headache.   NODES: Denies enlarged lymph nodes.   CHEST: Denies chest pain or shortness of breath.   CARDIOVASCULAR: Denies palpitations or left sided chest pain.   ABDOMEN: No constipation, diarrhea, nausea, vomiting or rectal bleeding.   URINARY: No frequency, dysuria,  hematuria, or burning on urination.      Physical Exam:     APPEARANCE: Well nourished, well developed, in no acute distress.  AFFECT: WNL, alert and oriented x 3  [facility] RRR  Lungs CTAB  ABDOMEN: Soft.  No tenderness or masses.  Small umb hernia  PELVIC:deferred  EXTREMITIES: No edema.     ASSESSMENT AND PLAN     Yanique was seen today for pelvic pain.     Diagnoses and all orders for this visit:     Lower abdominal pain              Ideology unclear.  Will proceed with scope   Risks and benefits explained in detail to patient, including but not limited to damage to internal organs, including but not limited to bowel, bladder, uterus, tubes, ovaries, nerves, arteries, veins, possible need for blood transfusion, possible need for hysterectomy. Patient is aware of all risks and desires laparoscopy. All questions answered. Consents signed.

## 2018-02-08 ENCOUNTER — TELEPHONE (OUTPATIENT)
Dept: OBSTETRICS AND GYNECOLOGY | Facility: CLINIC | Age: 43
End: 2018-02-08

## 2018-02-20 ENCOUNTER — ANESTHESIA (OUTPATIENT)
Dept: SURGERY | Facility: OTHER | Age: 43
End: 2018-02-20
Payer: MEDICARE

## 2018-02-20 ENCOUNTER — SURGERY (OUTPATIENT)
Age: 43
End: 2018-02-20

## 2018-02-20 ENCOUNTER — HOSPITAL ENCOUNTER (OUTPATIENT)
Facility: OTHER | Age: 43
Discharge: HOME | End: 2018-02-20
Attending: OBSTETRICS & GYNECOLOGY | Admitting: OBSTETRICS & GYNECOLOGY
Payer: MEDICARE

## 2018-02-20 VITALS
DIASTOLIC BLOOD PRESSURE: 72 MMHG | RESPIRATION RATE: 18 BRPM | HEART RATE: 82 BPM | BODY MASS INDEX: 24.41 KG/M2 | HEIGHT: 64 IN | TEMPERATURE: 98 F | WEIGHT: 143 LBS | OXYGEN SATURATION: 98 % | SYSTOLIC BLOOD PRESSURE: 115 MMHG

## 2018-02-20 DIAGNOSIS — Z87.42 HISTORY OF ENDOMETRIOSIS: Primary | ICD-10-CM

## 2018-02-20 DIAGNOSIS — R10.2 PELVIC PAIN: ICD-10-CM

## 2018-02-20 LAB
B-HCG UR QL: NEGATIVE
CTP QC/QA: YES
POCT GLUCOSE: 99 MG/DL (ref 70–110)

## 2018-02-20 PROCEDURE — 27201423 OPTIME MED/SURG SUP & DEVICES STERILE SUPPLY: Performed by: OBSTETRICS & GYNECOLOGY

## 2018-02-20 PROCEDURE — 63600175 PHARM REV CODE 636 W HCPCS: Performed by: OBSTETRICS & GYNECOLOGY

## 2018-02-20 PROCEDURE — 25000003 PHARM REV CODE 250: Performed by: ANESTHESIOLOGY

## 2018-02-20 PROCEDURE — 71000033 HC RECOVERY, INTIAL HOUR: Performed by: OBSTETRICS & GYNECOLOGY

## 2018-02-20 PROCEDURE — 71000016 HC POSTOP RECOV ADDL HR: Performed by: OBSTETRICS & GYNECOLOGY

## 2018-02-20 PROCEDURE — 63600175 PHARM REV CODE 636 W HCPCS: Performed by: NURSE ANESTHETIST, CERTIFIED REGISTERED

## 2018-02-20 PROCEDURE — 81025 URINE PREGNANCY TEST: CPT | Performed by: OBSTETRICS & GYNECOLOGY

## 2018-02-20 PROCEDURE — 49320 DIAG LAPARO SEPARATE PROC: CPT | Mod: ,,, | Performed by: OBSTETRICS & GYNECOLOGY

## 2018-02-20 PROCEDURE — 49320 DIAG LAPARO SEPARATE PROC: CPT | Mod: 82,,, | Performed by: OBSTETRICS & GYNECOLOGY

## 2018-02-20 PROCEDURE — 37000009 HC ANESTHESIA EA ADD 15 MINS: Performed by: OBSTETRICS & GYNECOLOGY

## 2018-02-20 PROCEDURE — 25000003 PHARM REV CODE 250: Performed by: NURSE ANESTHETIST, CERTIFIED REGISTERED

## 2018-02-20 PROCEDURE — 63600175 PHARM REV CODE 636 W HCPCS: Performed by: ANESTHESIOLOGY

## 2018-02-20 PROCEDURE — 82962 GLUCOSE BLOOD TEST: CPT | Performed by: OBSTETRICS & GYNECOLOGY

## 2018-02-20 PROCEDURE — 36000708 HC OR TIME LEV III 1ST 15 MIN: Performed by: OBSTETRICS & GYNECOLOGY

## 2018-02-20 PROCEDURE — 36000709 HC OR TIME LEV III EA ADD 15 MIN: Performed by: OBSTETRICS & GYNECOLOGY

## 2018-02-20 PROCEDURE — 25000242 PHARM REV CODE 250 ALT 637 W/ HCPCS: Performed by: ANESTHESIOLOGY

## 2018-02-20 PROCEDURE — 37000008 HC ANESTHESIA 1ST 15 MINUTES: Performed by: OBSTETRICS & GYNECOLOGY

## 2018-02-20 PROCEDURE — 94640 AIRWAY INHALATION TREATMENT: CPT

## 2018-02-20 PROCEDURE — 71000015 HC POSTOP RECOV 1ST HR: Performed by: OBSTETRICS & GYNECOLOGY

## 2018-02-20 RX ORDER — SODIUM CHLORIDE, SODIUM LACTATE, POTASSIUM CHLORIDE, CALCIUM CHLORIDE 600; 310; 30; 20 MG/100ML; MG/100ML; MG/100ML; MG/100ML
INJECTION, SOLUTION INTRAVENOUS CONTINUOUS
Status: DISCONTINUED | OUTPATIENT
Start: 2018-02-20 | End: 2018-02-20 | Stop reason: HOSPADM

## 2018-02-20 RX ORDER — ACETAMINOPHEN 10 MG/ML
INJECTION, SOLUTION INTRAVENOUS
Status: DISCONTINUED | OUTPATIENT
Start: 2018-02-20 | End: 2018-02-20

## 2018-02-20 RX ORDER — ONDANSETRON 2 MG/ML
4 INJECTION INTRAMUSCULAR; INTRAVENOUS DAILY PRN
Status: DISCONTINUED | OUTPATIENT
Start: 2018-02-20 | End: 2018-02-20 | Stop reason: HOSPADM

## 2018-02-20 RX ORDER — OXYCODONE HYDROCHLORIDE 5 MG/1
5 TABLET ORAL
Status: DISCONTINUED | OUTPATIENT
Start: 2018-02-20 | End: 2018-02-20 | Stop reason: HOSPADM

## 2018-02-20 RX ORDER — SCOLOPAMINE TRANSDERMAL SYSTEM 1 MG/1
1 PATCH, EXTENDED RELEASE TRANSDERMAL
Status: DISCONTINUED | OUTPATIENT
Start: 2018-02-20 | End: 2018-02-20 | Stop reason: HOSPADM

## 2018-02-20 RX ORDER — FENTANYL CITRATE 50 UG/ML
INJECTION, SOLUTION INTRAMUSCULAR; INTRAVENOUS
Status: DISCONTINUED | OUTPATIENT
Start: 2018-02-20 | End: 2018-02-20

## 2018-02-20 RX ORDER — LIDOCAINE HYDROCHLORIDE 10 MG/ML
0.5 INJECTION, SOLUTION EPIDURAL; INFILTRATION; INTRACAUDAL; PERINEURAL ONCE
Status: DISCONTINUED | OUTPATIENT
Start: 2018-02-20 | End: 2018-02-20 | Stop reason: HOSPADM

## 2018-02-20 RX ORDER — CEFAZOLIN SODIUM 2 G/50ML
2 SOLUTION INTRAVENOUS
Status: COMPLETED | OUTPATIENT
Start: 2018-02-20 | End: 2018-02-20

## 2018-02-20 RX ORDER — ALBUTEROL SULFATE 0.83 MG/ML
2.5 SOLUTION RESPIRATORY (INHALATION)
Status: COMPLETED | OUTPATIENT
Start: 2018-02-20 | End: 2018-02-20

## 2018-02-20 RX ORDER — DEXAMETHASONE SODIUM PHOSPHATE 4 MG/ML
INJECTION, SOLUTION INTRA-ARTICULAR; INTRALESIONAL; INTRAMUSCULAR; INTRAVENOUS; SOFT TISSUE
Status: DISCONTINUED | OUTPATIENT
Start: 2018-02-20 | End: 2018-02-20

## 2018-02-20 RX ORDER — KETOROLAC TROMETHAMINE 30 MG/ML
INJECTION, SOLUTION INTRAMUSCULAR; INTRAVENOUS
Status: DISCONTINUED | OUTPATIENT
Start: 2018-02-20 | End: 2018-02-20

## 2018-02-20 RX ORDER — LIDOCAINE HCL/PF 100 MG/5ML
SYRINGE (ML) INTRAVENOUS
Status: DISCONTINUED | OUTPATIENT
Start: 2018-02-20 | End: 2018-02-20

## 2018-02-20 RX ORDER — GLYCOPYRROLATE 0.2 MG/ML
INJECTION INTRAMUSCULAR; INTRAVENOUS
Status: DISCONTINUED | OUTPATIENT
Start: 2018-02-20 | End: 2018-02-20

## 2018-02-20 RX ORDER — FENTANYL CITRATE 50 UG/ML
25 INJECTION, SOLUTION INTRAMUSCULAR; INTRAVENOUS EVERY 5 MIN PRN
Status: COMPLETED | OUTPATIENT
Start: 2018-02-20 | End: 2018-02-20

## 2018-02-20 RX ORDER — SODIUM CHLORIDE 0.9 % (FLUSH) 0.9 %
3 SYRINGE (ML) INJECTION
Status: DISCONTINUED | OUTPATIENT
Start: 2018-02-20 | End: 2018-02-20 | Stop reason: HOSPADM

## 2018-02-20 RX ORDER — ONDANSETRON 2 MG/ML
INJECTION INTRAMUSCULAR; INTRAVENOUS
Status: DISCONTINUED | OUTPATIENT
Start: 2018-02-20 | End: 2018-02-20

## 2018-02-20 RX ORDER — PROPOFOL 10 MG/ML
VIAL (ML) INTRAVENOUS
Status: DISCONTINUED | OUTPATIENT
Start: 2018-02-20 | End: 2018-02-20

## 2018-02-20 RX ORDER — MEPERIDINE HYDROCHLORIDE 50 MG/ML
12.5 INJECTION INTRAMUSCULAR; INTRAVENOUS; SUBCUTANEOUS ONCE AS NEEDED
Status: DISCONTINUED | OUTPATIENT
Start: 2018-02-20 | End: 2018-02-20 | Stop reason: HOSPADM

## 2018-02-20 RX ORDER — HYDROMORPHONE HYDROCHLORIDE 2 MG/ML
0.4 INJECTION, SOLUTION INTRAMUSCULAR; INTRAVENOUS; SUBCUTANEOUS EVERY 5 MIN PRN
Status: DISCONTINUED | OUTPATIENT
Start: 2018-02-20 | End: 2018-02-20 | Stop reason: HOSPADM

## 2018-02-20 RX ORDER — ROCURONIUM BROMIDE 10 MG/ML
INJECTION, SOLUTION INTRAVENOUS
Status: DISCONTINUED | OUTPATIENT
Start: 2018-02-20 | End: 2018-02-20

## 2018-02-20 RX ORDER — MIDAZOLAM HYDROCHLORIDE 1 MG/ML
INJECTION INTRAMUSCULAR; INTRAVENOUS
Status: DISCONTINUED | OUTPATIENT
Start: 2018-02-20 | End: 2018-02-20

## 2018-02-20 RX ADMIN — SUGAMMADEX 100 MG: 100 INJECTION, SOLUTION INTRAVENOUS at 07:02

## 2018-02-20 RX ADMIN — OXYCODONE HYDROCHLORIDE 5 MG: 5 TABLET ORAL at 08:02

## 2018-02-20 RX ADMIN — ROCURONIUM BROMIDE 40 MG: 10 INJECTION, SOLUTION INTRAVENOUS at 07:02

## 2018-02-20 RX ADMIN — ONDANSETRON 4 MG: 2 INJECTION INTRAMUSCULAR; INTRAVENOUS at 07:02

## 2018-02-20 RX ADMIN — MIDAZOLAM HYDROCHLORIDE 2 MG: 1 INJECTION, SOLUTION INTRAMUSCULAR; INTRAVENOUS at 06:02

## 2018-02-20 RX ADMIN — FENTANYL CITRATE 25 MCG: 50 INJECTION, SOLUTION INTRAMUSCULAR; INTRAVENOUS at 08:02

## 2018-02-20 RX ADMIN — SCOPALAMINE 1 PATCH: 1 PATCH, EXTENDED RELEASE TRANSDERMAL at 06:02

## 2018-02-20 RX ADMIN — CEFAZOLIN SODIUM 2 G: 2 SOLUTION INTRAVENOUS at 07:02

## 2018-02-20 RX ADMIN — ACETAMINOPHEN 1000 MG: 10 INJECTION, SOLUTION INTRAVENOUS at 07:02

## 2018-02-20 RX ADMIN — GLYCOPYRROLATE 0.2 MG: 0.2 INJECTION, SOLUTION INTRAMUSCULAR; INTRAVENOUS at 07:02

## 2018-02-20 RX ADMIN — LIDOCAINE HYDROCHLORIDE 75 MG: 20 INJECTION, SOLUTION INTRAVENOUS at 07:02

## 2018-02-20 RX ADMIN — CARBOXYMETHYLCELLULOSE SODIUM 2 DROP: 2.5 SOLUTION/ DROPS OPHTHALMIC at 07:02

## 2018-02-20 RX ADMIN — FENTANYL CITRATE 100 MCG: 50 INJECTION, SOLUTION INTRAMUSCULAR; INTRAVENOUS at 07:02

## 2018-02-20 RX ADMIN — KETOROLAC TROMETHAMINE 30 MG: 30 INJECTION, SOLUTION INTRAMUSCULAR; INTRAVENOUS at 07:02

## 2018-02-20 RX ADMIN — SODIUM CHLORIDE, SODIUM LACTATE, POTASSIUM CHLORIDE, AND CALCIUM CHLORIDE: 600; 310; 30; 20 INJECTION, SOLUTION INTRAVENOUS at 06:02

## 2018-02-20 RX ADMIN — ALBUTEROL SULFATE 2.5 MG: 2.5 SOLUTION RESPIRATORY (INHALATION) at 05:02

## 2018-02-20 RX ADMIN — DEXAMETHASONE SODIUM PHOSPHATE 8 MG: 4 INJECTION, SOLUTION INTRAMUSCULAR; INTRAVENOUS at 07:02

## 2018-02-20 RX ADMIN — PROPOFOL 150 MG: 10 INJECTION, EMULSION INTRAVENOUS at 07:02

## 2018-02-20 NOTE — OPERATIVE NOTE ADDENDUM
Certification of Assistant at Surgery       Surgery Date: 2/20/2018     Participating Surgeons:  Surgeon(s) and Role:     * Festus Benitez MD - Primary     * Dayna Baires MD - Assisting    Procedures:  Procedure(s) (LRB):  LAPAROSCOPY DIAG (N/A)    Assistant Surgeon's Certification of Necessity:  I understand that section 1842 (b) (6) (d) of the Social Security Act generally prohibits Medicare Part B reasonable charge payment for the services of assistants at surgery in [facility] hospitals when qualified residents are available to furnish such services. I certify that the services for which payment is claimed were medically necessary, and that no qualified resident was available to perform the services. I further understand that these services are subject to post-payment review by the Medicare carrier.      Dayna Baires MD    02/20/2018  7:51 AM

## 2018-02-20 NOTE — TRANSFER OF CARE
"Anesthesia Transfer of Care Note    Patient: Yanique Mcdermott    Procedure(s) Performed: Procedure(s) (LRB):  LAPAROSCOPY DIAG (N/A)    Patient location: PACU    Anesthesia Type: general    Transport from OR: Transported from OR on room air with adequate spontaneous ventilation    Post pain: adequate analgesia    Post assessment: no apparent anesthetic complications and tolerated procedure well    Post vital signs: stable    Level of consciousness: awake, alert and oriented    Complications: none    Transfer of care protocol was followed      Last vitals:   Visit Vitals  BP (!) 108/56   Pulse 84   Temp 36.8 °C (98.2 °F)   Resp 18   Ht 5' 4" (1.626 m)   Wt 64.9 kg (143 lb)   SpO2 95%   BMI 24.55 kg/m²     "

## 2018-02-20 NOTE — ANESTHESIA POSTPROCEDURE EVALUATION
"Anesthesia Post Evaluation    Patient: Yanique Mcdermott    Procedure(s) Performed: Procedure(s) (LRB):  LAPAROSCOPY DIAG (N/A)    Final Anesthesia Type: general  Patient location during evaluation: PACU  Patient participation: Yes- Able to Participate  Level of consciousness: awake and alert  Post-procedure vital signs: reviewed and stable  Pain management: adequate  Airway patency: patent  PONV status at discharge: No PONV  Anesthetic complications: no      Cardiovascular status: blood pressure returned to baseline  Respiratory status: unassisted, spontaneous ventilation and room air  Hydration status: euvolemic  Follow-up not needed.        Visit Vitals  /72   Pulse 82   Temp 36.6 °C (97.8 °F) (Oral)   Resp 18   Ht 5' 4" (1.626 m)   Wt 64.9 kg (143 lb)   SpO2 98%   BMI 24.55 kg/m²       Pain/Tova Score: Pain Assessment Performed: Yes (2/20/2018  9:45 AM)  Presence of Pain: complains of pain/discomfort (2/20/2018  8:34 AM)  Pain Rating Prior to Med Admin: 5 (2/20/2018  8:31 AM)  Pain Rating Post Med Admin: 4 (states tolerable) (2/20/2018  8:31 AM)  Tova Score: 10 (2/20/2018  9:45 AM)      "

## 2018-02-20 NOTE — INTERVAL H&P NOTE
The patient has been examined and the H&P has been reviewed:     I concur with the findings and no changes have occurred since H&P was written.    Anesthesia/Surgery risks, benefits and alternative options discussed and understood by patient/family.          Active Hospital Problems    Diagnosis  POA    Pelvic pain [R10.2]  Yes      Resolved Hospital Problems    Diagnosis Date Resolved POA   No resolved problems to display.

## 2018-02-20 NOTE — DISCHARGE SUMMARY
[facility]  Discharge Summary  Gynecology      Admit Date: 2/20/2018    Discharge Date and Time: 2/20/2018    Attending Physician: Festus Benitez MD     Discharge Provider: Festus Benitez    Reason for Admission: Laparoscopy    Procedures Performed: Procedure(s) (LRB):  LAPAROSCOPY DIAG (N/A)    Hospital Course (synopsis of major diagnoses, care, treatment, and services provided during the course of the hospital stay): Patient was admitted for surgery. Following surgery she was transferred to the floor and underwent routine postoperative care. She tolerated po, ambulated without difficulty, and pain was well controlled. She remained afebrile throughout hospital course and her labs were stable. She was discharged to home in stable condition.      Consults: none    Significant Diagnostic Studies: Labs: CBC Lab Results   Component Value Date    WBC 5.64 02/07/2018    HGB 12.5 02/07/2018    HCT 37.2 02/07/2018    MCV 90 02/07/2018     02/07/2018       Final Diagnoses:   Principal Problem: Pelvic pain   Secondary Diagnoses:   Active Hospital Problems    Diagnosis  POA    *Pelvic pain [R10.2]  Yes    History of endometriosis [Z87.42]  Not Applicable      Resolved Hospital Problems    Diagnosis Date Resolved POA   No resolved problems to display.       Discharged Condition: stable    Disposition: Home    Follow Up/Patient Instructions: 2 weeks    Medications:  Reconciled Home Medications: Current Discharge Medication List      CONTINUE these medications which have NOT CHANGED    Details   clonazePAM (KLONOPIN) 0.5 MG tablet Take 0.5 mg by mouth daily as needed.  Refills: 0      econazole nitrate 1 % cream APPLY TO RASH TWICE DAILY FOR 14 DAYS  Refills: 3      fluoxetine 20 MG tablet Take 30 mg by mouth once daily.  Refills: 2      ibuprofen (ADVIL,MOTRIN) 800 MG tablet Take 1 tablet (800 mg total) by mouth every 8 (eight) hours as needed for Pain.  Qty: 20 tablet, Refills: 0    Associated  Diagnoses: Pre-op examination      levothyroxine (SYNTHROID) 75 MCG tablet Take 75 mcg by mouth once daily.  Refills: 3      multivitamin (ONE DAILY MULTIVITAMIN) per tablet Take 1 tablet by mouth once daily.      nicotine (NICODERM CQ) 14 mg/24 hr PLACE 1 PATCH ON SKIN EVERY DAY  Refills: 3      oxyCODONE-acetaminophen (PERCOCET) 5-325 mg per tablet Take 1 tablet by mouth every 4 (four) hours as needed for Pain.  Qty: 10 tablet, Refills: 0    Associated Diagnoses: Pre-op examination      tizanidine 4 mg Cap Take 1 capsule by mouth nightly.  Refills: 2      VITAMIN D2 50,000 unit capsule TAKE ONE CAPSULE BY MOUTH EVERY 2 WEEKS  Refills: 3             Discharge Procedure Orders  Call MD for:  temperature >100.4     Call MD for:  persistent nausea and vomiting or diarrhea     Call MD for:  severe uncontrolled pain     Call MD for:  redness, tenderness, or signs of infection (pain, swelling, redness, odor or green/yellow discharge around incision site)     No dressing needed       Follow-up Information     Festus Benitez MD In 2 weeks.    Specialties:  Obstetrics, Gynecology, Obstetrics and Gynecology  Contact information:  [address]  [address]  [facility] [address] [phone]

## 2018-02-20 NOTE — PLAN OF CARE
Yanique Tremayne Roos has met all discharge criteria from Phase II. Vital Signs are stable, ambulating  without difficulty. Discharge instructions given, patient verbalized understanding. Discharged from facility via wheelchair in stable condition.

## 2018-02-20 NOTE — OP NOTE
[facility]  [facility]  Operative [facility]    SUMMARY     Date of Procedure: 2/20/2018     Procedure: Procedure(s) (LRB):  LAPAROSCOPY DIAG (N/A)       Surgeon(s) and Role:     * Festus Benitez MD - Primary     * Dayna Baires MD - Assisting  There was no qualified resident available at the time of the procedure.      Pre-Operative Diagnosis: Female pelvic pain [R10.2]  History of endometriosis [Z87.42]    Post-Operative Diagnosis: Post-Op Diagnosis Codes:     * Female pelvic pain [R10.2]     * History of endometriosis [Z87.42]    Anesthesia: General    Technical Procedures Used: Diagnostic laparoscopy    Description of the Findings of the Procedure: Diagnostic laparoscopy  Findings: The abdomen was the evaluated and the findings were normal uterus tube and ovaries. Retrograde menstruation noted. Normal appendix, normal liver edge. No evid of endometriosis    Complications: No    Estimated Blood Loss (EBL): * No values recorded between 2/20/2018  7:29 AM and 2/20/2018  7:53 AM *    Specimens:   Specimen (12h ago through future)    None                  Condition: Good    Disposition: PACU - hemodynamically stable.    Attestation: A qualified resident physician was not available    Procedure in detail:    Patient was taken to the operating room where general anesthesia was performed. She was then prepped and draped in the normal sterile fashion. She was placed in the dorsal lithotomy position in Gulshan stirrups. Her arms were tucked in the usual fashion. A sotelo was placed to gravity. A uterine manipulator was placed in the usual fashion.  Attention was then turned abdominally and a *5mm skin incision was made with a knife. Towel clamps were used to elevate the abdomen. The veress needle was used to enter the abdomen without difficulty with a starting pressure of 3 mm Hg. The abdomen was then insufflated to 15 mmHg. A 5 mm bladeless trocar was then placed  without difficulty. The patient was then  placed in trendelenburg position. A 5 mm bladeless trocar was placed in the LLQ in the usual fashion.   The abdomen was the evaluated and the findings were normal uterus tube and ovaries. Retrograde menstruation noted. Normal appendix, normal liver edge.  No evid of endometriosis  The trocars were then removed. The skin incisions were closed using 4-0 Monocryl. The instruments were removed from the vagina with excellent hemostasis and no active bleeding was noted. The patient tolerated the procedure well. Sponge lap and needle counts correct x 2.

## 2018-02-20 NOTE — DISCHARGE INSTRUCTIONS
Abdominal Laparoscopy Discharge Instructions      Activity  · Limit your activity for 4-6 weeks.  · Dont lift anything heavier than 5-10 pounds.  · Avoid strenuous activities, such as mowing the lawn, vacuuming, or playing sports.  · Limit your activity to short, slow walks. Gradually increase your pace and distance as you feel able.  · Listen to your body. If an activity causes pain, stop.  · Rest when you are tired.  · Dont have sexual intercourse or use tampons or douches until your doctor says its safe to do so.    Home Care  · Always keep your incision clean and dry.  · Shower as instructed in 24 hours. You may wash your incision gently with mild soap and warm water and pat dry.  Avoid tub baths, hot tubs and swimming pools until seen by your physician for a post-op follow up.  · If you have steri strips they should fall off in 7-10 days.    · If you have band aids covering your incisions change daily or when soiled.  The band aids may be removed post op day 1 if they are clean and dry.  · Take your medication exactly as instructed by your doctor.  · Return to your diet as you feel able. Eat a healthy, well-balanced diet.  · Avoid constipation.  ¨ Use laxatives, stool softeners, or enemas as directed by your doctor.  ¨ Eat more high-fiber foods.  ¨ Drink 6-8 glasses of water every day, unless directed otherwise.  Follow-Up  Make a follow-up appointment as directed by our staff.       When to Call Your Doctor  Call your doctor right away if you have any of the following:  · Fever above 101.5°F  (38.6°C) or chills  · Bright red vaginal bleeding or a foul-smelling discharge  · Vaginal bleeding that soaks more than one sanitary pad per hour  · Trouble urinating or burning sensation when you urinate  · Severe abdominal pain or bloating  · Redness, swelling, or drainage at your incision site  · Shortness of breath        ________________________________________________________________  FOR EMERGENCIES:  If any  unusual problems or difficulties occur, contact Dr. Benitez.        Discharge Instructions: After Your Surgery  Youve just had surgery. During surgery, you were given medicine called anesthesia to keep you relaxed and free of pain. After surgery, you may have some pain or nausea. This is common. Here are some tips for feeling better and getting well after surgery.     Stay on schedule with your medicine.     Going home  Your healthcare provider will show you how to take care of yourself when you go home. He or she will also answer your questions. Have an adult family member or friend drive you home. For the first 24 hours after your surgery:    · Do not drive or use heavy equipment.  · Do not make important decisions or sign legal papers.  · Do not drink alcohol.  · Have someone stay with you, if needed. He or she can watch for problems and help keep you safe.    Be sure to go to all follow-up visits with your healthcare provider. And rest after your surgery for as long as your healthcare provider tells you to.    Coping with pain  If you have pain after surgery, pain medicine will help you feel better. Take it as told, before pain becomes severe. Also, ask your healthcare provider or pharmacist about other ways to control pain. This might be with heat, ice, or relaxation. And follow any other instructions your surgeon or nurse gives you.    Tips for taking pain medicine  To get the best relief possible, remember these points:    · Pain medicines can upset your stomach. Taking them with a little food may help.  · Most pain relievers taken by mouth need at least 20 to 30 minutes to start to work.  · Taking medicine on a schedule can help you remember to take it. Try to time your medicine so that you can take it before starting an activity. This might be before you get dressed, go for a walk, or sit down for dinner.  · Constipation is a common side effect of pain medicines. Call your healthcare provider before taking  any medicines such as laxatives or stool softeners to help ease constipation. Also ask if you should skip any foods. Drinking lots of fluids and eating foods such as fruits and vegetables that are high in fiber can also help. Remember, do not take laxatives unless your surgeon has prescribed them.  · Drinking alcohol and taking pain medicine can cause dizziness and slow your breathing. It can even be deadly. Do not drink alcohol while taking pain medicine.  · Pain medicine can make you react more slowly to things. Do not drive or run machinery while taking pain medicine.    Your healthcare provider may tell you to take acetaminophen to help ease your pain. Ask him or her how much you are supposed to take each day. Acetaminophen or other pain relievers may interact with your prescription medicines or other over-the-counter (OTC) medicines. Some prescription medicines have acetaminophen and other ingredients. Using both prescription and OTC acetaminophen for pain can cause you to overdose. Read the labels on your OTC medicines with care. This will help you to clearly know the list of ingredients, how much to take, and any warnings. It may also help you not take too much acetaminophen. If you have questions or do not understand the information, ask your pharmacist or healthcare provider to explain it to you before you take the OTC medicine.    Managing nausea  Some people have an upset stomach after surgery. This is often because of anesthesia, pain, or pain medicine, or the stress of surgery. These tips will help you handle nausea and eat healthy foods as you get better. If you were on a special food plan before surgery, ask your healthcare provider if you should follow it while you get better. These tips may help:    · Do not push yourself to eat. Your body will tell you when to eat and how much.  · Start off with clear liquids and soup. They are easier to digest.  · Next try semi-solid foods, such as mashed  potatoes, applesauce, and gelatin, as you feel ready.  · Slowly move to solid foods. Dont eat fatty, rich, or spicy foods at first.  · Do not force yourself to have 3 large meals a day. Instead eat smaller amounts more often.  · Take pain medicines with a small amount of solid food, such as crackers or toast, to avoid nausea.     Call your surgeon if  · You still have pain an hour after taking medicine. The medicine may not be strong enough.  · You feel too sleepy, dizzy, or groggy. The medicine may be too strong.  · You have side effects like nausea, vomiting, or skin changes, such as rash, itching, or hives.       If you have obstructive sleep apnea  You were given anesthesia medicine during surgery to keep you comfortable and free of pain. After surgery, you may have more apnea spells because of this medicine and other medicines you were given. The spells may last longer than usual.   At home:    · Keep using the continuous positive airway pressure (CPAP) device when you sleep. Unless your healthcare provider tells you not to, use it when you sleep, day or night. CPAP is a common device used to treat obstructive sleep apnea.  · Talk with your provider before taking any pain medicine, muscle relaxants, or sedatives. Your provider will tell you about the possible dangers of taking these medicines.    © 7366-2962 [employer]. [address] [place] [address], [place] [address]. All rights reserved. This information is not intended as a substitute for professional medical care. Always follow your healthcare professional's instructions.    PLEASE FOLLOW ANY OTHER INSTRUCTIONS PROVIDED TO YOU BY DR. PEREZ!

## 2018-03-01 ENCOUNTER — TELEPHONE (OUTPATIENT)
Dept: OBSTETRICS AND GYNECOLOGY | Facility: CLINIC | Age: 43
End: 2018-03-01

## 2018-03-01 ENCOUNTER — OFFICE VISIT (OUTPATIENT)
Dept: OBSTETRICS AND GYNECOLOGY | Facility: CLINIC | Age: 43
End: 2018-03-01
Payer: MEDICARE

## 2018-03-01 VITALS
BODY MASS INDEX: 24.46 KG/M2 | WEIGHT: 143.31 LBS | SYSTOLIC BLOOD PRESSURE: 112 MMHG | DIASTOLIC BLOOD PRESSURE: 70 MMHG | HEIGHT: 64 IN

## 2018-03-01 DIAGNOSIS — R10.2 PELVIC PAIN IN FEMALE: Primary | ICD-10-CM

## 2018-03-01 LAB
BILIRUB SERPL-MCNC: NORMAL MG/DL
BLOOD URINE, POC: NORMAL
COLOR, POC UA: NORMAL
GLUCOSE UR QL STRIP: NORMAL
KETONES UR QL STRIP: NORMAL
LEUKOCYTE ESTERASE URINE, POC: NORMAL
NITRITE, POC UA: NORMAL
PH, POC UA: NORMAL
PROTEIN, POC: NORMAL
SPECIFIC GRAVITY, POC UA: NORMAL
UROBILINOGEN, POC UA: NORMAL

## 2018-03-01 PROCEDURE — 87086 URINE CULTURE/COLONY COUNT: CPT

## 2018-03-01 PROCEDURE — 81002 URINALYSIS NONAUTO W/O SCOPE: CPT | Mod: S$GLB,,, | Performed by: OBSTETRICS & GYNECOLOGY

## 2018-03-01 PROCEDURE — 99999 PR PBB SHADOW E&M-EST. PATIENT-LVL III: CPT | Mod: PBBFAC,,, | Performed by: OBSTETRICS & GYNECOLOGY

## 2018-03-01 PROCEDURE — 99212 OFFICE O/P EST SF 10 MIN: CPT | Mod: 25,S$GLB,, | Performed by: OBSTETRICS & GYNECOLOGY

## 2018-03-01 NOTE — TELEPHONE ENCOUNTER
Patient coming in today at 1pm for abdominal soreness/pain. She has a post op scheduled for  Thursday 3/8. Patient wants to know if you still want to see her earlier than the 8th even though she is coming today?

## 2018-03-01 NOTE — TELEPHONE ENCOUNTER
Have her come in today at 1:00 pm to see me and move up her post op appt for inc check and lets just check her urine

## 2018-03-01 NOTE — TELEPHONE ENCOUNTER
"Bone pt- had laparoscopy on 2/20 states she is still uncomfortable. States she is wiped out and feels sore around her belly button. Patient states she is having regular bowel movements and is eating healthy. States the other day she had an excruciating pain on her left side that subsided after some time. She has an appt. Monday with urology because she is having some tenderness in her "bladder," but states she does not think its anything UTI related.  just in case it could be kidney stones. Denies redness, warmth, pus. Her incisions are clean, dry, and intact. Reassured patient that what she is feeling is fairly normal and resting is essential at this time. Her soreness will subside with time. Reminded to take remaining Motrin for pain relief.   "

## 2018-03-02 LAB — BACTERIA UR CULT: NO GROWTH

## 2018-03-04 NOTE — PROGRESS NOTES
Subjective:       Patient ID: Yanique Mcdermott is a 42 y.o. female.    Chief Complaint:  Patient called for an episode of left flank pain radiating to her pelvis.  Pain acute has now resolved now just has a dull soreness in her abdomen from surgery.  (post-op 9 days out laparoscopy, cramping & soreness. ,Also fatigue)      History of Present Illness  42 year-old status post laparoscopy 9 days ago for pelvic pain.  Laparoscopy was done for intermittent acute episodes of bilateral pelvic pain.  Laparoscopy was negative and normal.  With no evidence of any endometriosis on laparoscopy.    Patient states that since surgery she has had another episode of this same pain that began in her left lower back and radiated towards her pelvis.  The pain is sharp in nature and only lasts minutes but it can be excruciating and then it resolves on its own.  It seems to come on suddenly with no provocation.    Patient denies any other complaints.  No nausea, vomiting, not blood in her urine, dysuria, constipation, diarrhea,    Patient already has an appointment with a urologist for next week.  We discussed other possibilities such as ruling out GI origin as well as musculoskeletal.  This could also be coming from her back/disc issue could also be coming from pelvic floor dysfunction/pelvic floor muscle spasms.  She understands to go step-by-step she will rule out to urology then GI then back and also consider physical therapy.    Long discussion with patient approximately 30 minutes.  UA today negative but we'll send a culture      GYN & OB History  No LMP recorded.   Date of Last Pap: 10/2/2017    OB History    Para Term  AB Living   2 2 2     2   SAB TAB Ectopic Multiple Live Births           2      # Outcome Date GA Lbr Burt/2nd Weight Sex Delivery Anes PTL Lv   2 Term 07 38w0d  3.742 kg (8 lb 4 oz) M Vag-Spont EPI  JENNIFER   1 Term 04 38w0d  4.508 kg (9 lb 15 oz) M Vag-Spont EPI  JENNIFER      Obstetric  Comments   Menarche 12       Review of Systems  Review of Systems   Constitutional: Positive for fatigue.   Respiratory: Negative for chest tightness.    Cardiovascular: Negative for chest pain.   Gastrointestinal: Positive for abdominal distention and abdominal pain. Negative for blood in stool, constipation, diarrhea and nausea.   Genitourinary: Positive for flank pain and pelvic pain. Negative for dysuria, frequency, hematuria, urgency, vaginal bleeding, vaginal discharge and vaginal pain.        Objective:     Vitals:    03/01/18 1304   BP: 112/70       Physical Exam:   Constitutional: She appears well-developed and well-nourished.    HENT:   Head: Normocephalic and atraumatic.            Abdominal: Soft. She exhibits abdominal incision. She exhibits no distension and no mass. There is no tenderness. There is no guarding.   Incisions clear dry and intact                  Skin: Skin is warm.           Assessment/ Plan:     Orders Placed This Encounter    Urine culture    POCT urine dipstick without microscope       Yanique was seen today for post-op evaluation.    Diagnoses and all orders for this visit:    Pelvic pain in female  -     Urine culture  -     POCT urine dipstick without microscope   Discussed results at length with patient.  Patient understands that her laparoscopy was normal and there was no evidence of any GYN source of the pain.  She will follow-up with urology next week as scheduled.  After she sees urology she will contact me and let me know what was found.  We discussed possibility of IC.  We'll rule out UTI with a urine culture today.  Rule out kidney stones or other urological sources of pain.  See above as if urology is negative she will explore other sources of pelvic discomfort.  Case discussed with patient for about 30 minutes.    Other orders  -     Cancel: POCT Urinalysis        Follow-up if symptoms worsen or fail to improve.      Health Maintenance       Date Due Completion Date     Lipid Panel 1973 ---    TETANUS VACCINE 06/20/1991 ---    Pneumococcal PPSV23 (Medium Risk) (1) 06/20/1991 ---    Mammogram 06/20/2013 ---    Influenza Vaccine 08/01/2017 ---    Pap Smear with HPV Cotest 09/27/2020 9/27/2017

## 2018-03-05 ENCOUNTER — TELEPHONE (OUTPATIENT)
Dept: OBSTETRICS AND GYNECOLOGY | Facility: CLINIC | Age: 43
End: 2018-03-05

## 2018-03-05 NOTE — TELEPHONE ENCOUNTER
Notified patient of negative UC results per Dr. Benitez, verbalized understanding. Patient is to see a urologist today for treatment.

## 2018-03-05 NOTE — TELEPHONE ENCOUNTER
----- Message from Festus Benitez MD sent at 3/3/2018  6:42 AM CST -----  Please call basilio hardwick no growth  Cont to see urologist as plannned

## 2019-01-02 ENCOUNTER — TELEPHONE (OUTPATIENT)
Dept: OBSTETRICS AND GYNECOLOGY | Facility: CLINIC | Age: 44
End: 2019-01-02

## 2019-01-02 NOTE — TELEPHONE ENCOUNTER
Pt has been getting ingrown hairs that are getting infected.  She has had 3 infected follicles and noticed a 4th one recently despite soaking if different solutions.  She stopped shaving with a razor, only trims but its not helping.  Pt reports bleeding after intercourse x 1 episode but the bleeding did not continue for long.  Scheduled tomorrow with [facility] since Dr. Benitez is in the office if needed, advised if her vaginal exam and swab is negative then she may need to come back for an US.

## 2019-01-02 NOTE — TELEPHONE ENCOUNTER
Bone pt - pt has been getting ingrown hair in her pubic area, she has been trying to treat them at home but another one has popped up so she would like to see if she can come in for an appt. She is also having some internal bleeding that she is concerned about.

## 2019-01-03 ENCOUNTER — OFFICE VISIT (OUTPATIENT)
Dept: OBSTETRICS AND GYNECOLOGY | Facility: CLINIC | Age: 44
End: 2019-01-03
Payer: MEDICARE

## 2019-01-03 VITALS
BODY MASS INDEX: 25.29 KG/M2 | WEIGHT: 148.13 LBS | HEIGHT: 64 IN | SYSTOLIC BLOOD PRESSURE: 100 MMHG | DIASTOLIC BLOOD PRESSURE: 60 MMHG

## 2019-01-03 DIAGNOSIS — N89.8 VAGINAL DISCHARGE: Primary | ICD-10-CM

## 2019-01-03 DIAGNOSIS — L08.9 SKIN INFECTION: ICD-10-CM

## 2019-01-03 DIAGNOSIS — N90.89 VULVAL LESION: ICD-10-CM

## 2019-01-03 PROCEDURE — 87070 CULTURE OTHR SPECIMN AEROBIC: CPT

## 2019-01-03 PROCEDURE — 99214 OFFICE O/P EST MOD 30 MIN: CPT | Mod: S$GLB,,, | Performed by: NURSE PRACTITIONER

## 2019-01-03 PROCEDURE — 3008F PR BODY MASS INDEX (BMI) DOCUMENTED: ICD-10-PCS | Mod: CPTII,S$GLB,, | Performed by: NURSE PRACTITIONER

## 2019-01-03 PROCEDURE — 99214 PR OFFICE/OUTPT VISIT, EST, LEVL IV, 30-39 MIN: ICD-10-PCS | Mod: S$GLB,,, | Performed by: NURSE PRACTITIONER

## 2019-01-03 PROCEDURE — 99999 PR PBB SHADOW E&M-EST. PATIENT-LVL III: ICD-10-PCS | Mod: PBBFAC,,, | Performed by: NURSE PRACTITIONER

## 2019-01-03 PROCEDURE — 99999 PR PBB SHADOW E&M-EST. PATIENT-LVL III: CPT | Mod: PBBFAC,,, | Performed by: NURSE PRACTITIONER

## 2019-01-03 PROCEDURE — 87480 CANDIDA DNA DIR PROBE: CPT

## 2019-01-03 PROCEDURE — 3008F BODY MASS INDEX DOCD: CPT | Mod: CPTII,S$GLB,, | Performed by: NURSE PRACTITIONER

## 2019-01-03 RX ORDER — FLUNISOLIDE 0.25 MG/ML
2 SOLUTION NASAL DAILY
Refills: 11 | COMMUNITY
Start: 2018-10-29 | End: 2023-01-23

## 2019-01-04 LAB
CANDIDA RRNA VAG QL PROBE: NEGATIVE
G VAGINALIS RRNA GENITAL QL PROBE: POSITIVE
T VAGINALIS RRNA GENITAL QL PROBE: NEGATIVE

## 2019-01-05 RX ORDER — MUPIROCIN 20 MG/G
OINTMENT TOPICAL 3 TIMES DAILY
Qty: 30 G | Refills: 0 | Status: SHIPPED | OUTPATIENT
Start: 2019-01-05 | End: 2019-01-15

## 2019-01-05 RX ORDER — CEPHALEXIN 500 MG/1
500 CAPSULE ORAL EVERY 6 HOURS
Qty: 28 CAPSULE | Refills: 0 | Status: SHIPPED | OUTPATIENT
Start: 2019-01-05 | End: 2019-01-12

## 2019-01-06 DIAGNOSIS — B96.89 BV (BACTERIAL VAGINOSIS): Primary | ICD-10-CM

## 2019-01-06 DIAGNOSIS — N76.0 BV (BACTERIAL VAGINOSIS): Primary | ICD-10-CM

## 2019-01-06 RX ORDER — METRONIDAZOLE 500 MG/1
500 TABLET ORAL 2 TIMES DAILY
Qty: 14 TABLET | Refills: 0 | Status: SHIPPED | OUTPATIENT
Start: 2019-01-06 | End: 2019-10-30

## 2019-01-06 NOTE — PROGRESS NOTES
Chief Complaint: Problem:     Chief Complaint   Patient presents with    ingrown hair      Dr Benitez last pap  neg hpv  neg         (Dr. Benitez patient)    Last Pap:  10/2/2017 Normal,  HPV negative      HPI:      Yanique Mcdermott is a 43 y.o.  who presents today for complaints of ingrown hairs to mons and vulva.  She states she has at least 3-4 infected hair follicles.  She has since stopped using a razor to shave and only trans but the ingrown hairs in the lump that she feels beneath skin are still there and she has gotten a few more.  States she has used Hibiclens, pHisoHex, and antibacterial soap, and even warm Sitz baths, with no resolution of these bumps or ingrown hair follicles.      Her only other complaint today is that she does sometimes feel like her menstrual cycles are starting to change recently and has noticed an odor only during her cycle.  She does not notice this in between menstrual cycles.      LMP: Patient's last menstrual period was 2018 (exact date)..      Ms. Mcdermott is currently sexually active with a single male partner.   She declines STD screening today with her examination.      Past Medical History:   Diagnosis Date    Abnormal Pap smear of cervix 2010    Hpv (Dr Benitez)    Anemia     Anxiety     Depression     History of kidney stones     HPV (human papilloma virus) infection 2010    LEEP Done 12 (Dr Benitez)    Hx of thyroid nodule     Pelvic pain     PONV (postoperative nausea and vomiting)     Thyroid disease     Hypo    Tobacco use      Past Surgical History:   Procedure Laterality Date    BREAST AUGMENTATION      CERVICAL BIOPSY  W/ LOOP ELECTRODE EXCISION  2012    Dr Benitez    LAPAROSCOPY DIAG N/A 2018    [facility]    PELVIC LAPAROSCOPY  ,     THYROIDECTOMY, PARTIAL  1999    WISDOM TOOTH EXTRACTION       Social History     Socioeconomic History    Marital status:      Spouse  name: Not on file    Number of children: Not on file    Years of education: Not on file    Highest education level: Not on file   Social Needs    Financial resource strain: Not on file    Food insecurity - worry: Not on file    Food insecurity - inability: Not on file    Transportation needs - medical: Not on file    Transportation needs - non-medical: Not on file   Occupational History    Not on file   Tobacco Use    Smoking status: Current Every Day Smoker     Types: Cigarettes    Smokeless tobacco: Never Used   Substance and Sexual Activity    Alcohol use: Yes     Comment: Social    Drug use: No    Sexual activity: Yes     Partners: Male     Birth control/protection: Condom     Comment: :  Back with Ex   Other Topics Concern    Not on file   Social History Narrative    Not on file     Family History   Problem Relation Age of Onset    Prostate cancer Paternal Grandfather     Cancer Paternal Grandfather     Breast cancer Maternal Grandmother     Cancer Maternal Grandmother     Alzheimer's disease Maternal Grandmother     Colon cancer Maternal Grandfather     Cancer Maternal Grandfather     Pancreatic cancer Maternal Grandfather     Cancer Mother     Brain cancer Mother     Ovarian cancer Neg Hx      OB History      Para Term  AB Living    2 2 2     2    SAB TAB Ectopic Multiple Live Births            2        Obstetric Comments    Menarche 12          ROS:     GENERAL: Denies unintentional weight gain or weight loss. Feeling well overall.   SKIN:  Reports several skin lesions with palpable flat lumps beneath the surface of the skin to mons and labia majora; unsure if these are infected hair follicles..   HEENT: Denies headaches, or vision changes.   CARDIOVASCULAR: Denies palpitations or chest pain.   RESPIRATORY: Denies shortness of breath or dyspnea on exertion.  BREASTS: Denies pain, lumps, or nipple discharge.   ABDOMEN: Denies abdominal pain, constipation,  "diarrhea, nausea, vomiting, change in appetite.  URINARY: Denies frequency, dysuria, hematuria.  NEUROLOGIC: Denies syncope or weakness.   PSYCHIATRIC: Denies depression, anxiety or mood swings.  REPRODUCTIVE:  See HPI    Physical Exam:      PHYSICAL EXAM:  /60   Ht 5' 4" (1.626 m)   Wt 67.2 kg (148 lb 2.4 oz)   LMP 12/17/2018 (Exact Date)   BMI 25.43 kg/m²   Body mass index is 25.43 kg/m².     APPEARANCE: Well nourished, well developed, in no acute distress.  PSYCH: Appropriate mood and affect.  CHEST: Normal respiratory effort.  ABDOMEN: Soft.  No tenderness or masses.    PELVIC: External genitalia with 1 superficial lesion but appears to be healing, and several flat palpable oval-shaped lumps beneath surface of skin to mons and right labia majora also noted; these "lumps" are not mobile..  There is no erythema or rash noted around these lumps beneath surface of the skin.   Normal hair distribution.  Adequate perineal body, normal urethral meatus.  Vagina moist and well rugated without lesions; + small/mod amt thick, white homogenous discharge noted - affirm collected..  Cervix pink, without lesions, discharge or tenderness.  No significant cystocele or rectocele.  Bimanual exam shows uterus to be normal size, regular, mobile and nontender.  Adnexa without masses or tenderness.                Assessment/Plan:     Vaginal discharge  -     Vaginosis Screen by DNA Probe    Vulval lesion  -     Aerobic culture  -     cephALEXin (KEFLEX) 500 MG capsule; Take 1 capsule (500 mg total) by mouth every 6 (six) hours. for 7 days  Dispense: 28 capsule; Refill: 0    Skin infection  -     cephALEXin (KEFLEX) 500 MG capsule; Take 1 capsule (500 mg total) by mouth every 6 (six) hours. for 7 days  Dispense: 28 capsule; Refill: 0  -     mupirocin (BACTROBAN) 2 % ointment; Apply topically 3 (three) times daily. for 10 days  Dispense: 30 g; Refill: 0    * Although we originally discussed sending in Bactrim during her visit, " I opted for Keflex, as Bactrim showed up as allergy (causes her tongue to be black) upon attempting to enter orders.  Sent rx for topical Bactroban.  Advised patient that none of these lumps noted beneath the skin are at a point in which I would feel comfortable attempting to I&D them.  Think the most appropriate course of action is to start antibiotics now.  I also advised patient to call her dermatologist and set up an appointment within the next 2 weeks for further evaluation, as thy may not completely resolve with antibiotics.  She verbalized understanding.      Counseling:     Patient was counseled today on current ASCCP pap guidelines, the recommendation for yearly pelvic exams, healthy diet and exercise routines, annual mammograms and breast self awareness. She is to see her PCP for other health maintenance.     Follow-up if symptoms worsen or fail to improve.      * Patient aware that she will be notified once her finalized results have been reviewed by her Provider, either via her [facility] patient portal, or via telephone call.

## 2019-01-07 NOTE — PROGRESS NOTES
"Please call pt and tell her - - - -   "Your vaginal swab came back (+) for Bacterial Vaginosis.  This is NOT an STD.  This is similar to a yeast infection but instead of yeast, it's an overgrowth of vaginal bacteria.  The best way to treat this is with an antibiotic.  You cannot drink alcohol while taking this medication or for 3 days after completion of medication - this is very important.    I have sent in a prescription straight to your [facility] pharmacy.  Any symptoms of vaginal discharge and/or odor should go away after completion of medication.  If you have any other questions please call the office.  Your other culture is still in-process."    "

## 2019-01-09 LAB — BACTERIA SPEC AEROBE CULT: NORMAL

## 2019-01-10 ENCOUNTER — TELEPHONE (OUTPATIENT)
Dept: OBSTETRICS AND GYNECOLOGY | Facility: CLINIC | Age: 44
End: 2019-01-10

## 2019-01-10 NOTE — TELEPHONE ENCOUNTER
Bone pt - pt saw Socorro on 1/3 and was calling to get her results from her cultures that were sent off and also to let  and Socorro know she is having a pinkish vaginal discharge and would like to see what she should do.

## 2019-01-10 NOTE — PROGRESS NOTES
Please call patient and let her know that her culture was negative, and to be sure to follow-up with her dermatologist as soon as possible to be evaluated for what's going on with her skin.  Thank you

## 2019-01-11 NOTE — TELEPHONE ENCOUNTER
Informed of culture results.     Pt states the bloody discharge she called about yesterday is the start of her period.

## 2019-01-14 ENCOUNTER — TELEPHONE (OUTPATIENT)
Dept: OBSTETRICS AND GYNECOLOGY | Facility: CLINIC | Age: 44
End: 2019-01-14

## 2019-01-14 NOTE — TELEPHONE ENCOUNTER
----- Message from Socorro [place] NP sent at 1/10/2019  8:58 AM CST -----  Please call patient and let her know that her culture was negative, and to be sure to follow-up with her dermatologist as soon as possible to be evaluated for what's going on with her skin.  Thank you

## 2019-10-22 ENCOUNTER — TELEPHONE (OUTPATIENT)
Dept: OBSTETRICS AND GYNECOLOGY | Facility: CLINIC | Age: 44
End: 2019-10-22

## 2019-10-22 DIAGNOSIS — R10.2 PELVIC PAIN IN FEMALE: Primary | ICD-10-CM

## 2019-10-22 NOTE — TELEPHONE ENCOUNTER
Dr. Benitez pt called saying that she experienced extreme pain in her back and pelvis area and ever since then she has been very sore. Pt is concerned and asked to speak to nurse. Please advise.

## 2019-10-22 NOTE — TELEPHONE ENCOUNTER
Pt states she has a h/o ovarian cysts and kidney stones.  2 days ago she started with left sided pain that radiates to back and now still has pelvic pain.  Denies discharge but wants to be checked for an infection as well as r/o ovarian cysts.  Recommended she call urologist to r/o kidney stone.  She is aware of gap inbetween US and appt with Dr. Hair so there may be a longer wait.

## 2019-10-25 ENCOUNTER — TELEPHONE (OUTPATIENT)
Dept: OBSTETRICS AND GYNECOLOGY | Facility: CLINIC | Age: 44
End: 2019-10-25

## 2019-10-25 NOTE — TELEPHONE ENCOUNTER
Pt went to ER for pain. She was diagnosed with a kidney stone and UTI.  When she f/u with PCP she was told she also has an ovarian cyst per CT and to f/u with OBGYN.  Scheduled US and appt with Socorro Wednesday.  Aware of US prep.

## 2019-10-25 NOTE — TELEPHONE ENCOUNTER
Bone pt - pt was scheduled today for an appt with an u/s to follow up from her visit at the ER but she cancelled because they told her she has kidney stones and a UTI. Pt saw her PCP and told her she also has a cyst on her ovary so she still should follow up with her obgyn. Pt would like to see if she can come in for an appt next week.

## 2019-10-30 ENCOUNTER — OFFICE VISIT (OUTPATIENT)
Dept: OBSTETRICS AND GYNECOLOGY | Facility: CLINIC | Age: 44
End: 2019-10-30
Payer: MEDICARE

## 2019-10-30 VITALS
HEIGHT: 64 IN | DIASTOLIC BLOOD PRESSURE: 74 MMHG | WEIGHT: 143.19 LBS | BODY MASS INDEX: 24.45 KG/M2 | SYSTOLIC BLOOD PRESSURE: 98 MMHG

## 2019-10-30 DIAGNOSIS — R82.90 ABNORMAL FINDING IN URINE: ICD-10-CM

## 2019-10-30 DIAGNOSIS — N89.8 VAGINAL DISCHARGE: ICD-10-CM

## 2019-10-30 DIAGNOSIS — R11.0 NAUSEA: Primary | ICD-10-CM

## 2019-10-30 LAB
B-HCG UR QL: NEGATIVE
BACTERIA HYPHAE, POC: NEGATIVE
BILIRUB SERPL-MCNC: ABNORMAL MG/DL
BLOOD URINE, POC: ABNORMAL
COLOR, POC UA: YELLOW
CTP QC/QA: YES
GLUCOSE UR QL STRIP: ABNORMAL
KETONES UR QL STRIP: ABNORMAL
LEUKOCYTE ESTERASE URINE, POC: ABNORMAL
NITRITE, POC UA: ABNORMAL
PH, POC UA: 7
PROTEIN, POC: ABNORMAL
SPECIFIC GRAVITY, POC UA: 1000
UROBILINOGEN, POC UA: ABNORMAL
YEAST, POC: NEGATIVE

## 2019-10-30 PROCEDURE — 3008F PR BODY MASS INDEX (BMI) DOCUMENTED: ICD-10-PCS | Mod: CPTII,S$GLB,, | Performed by: NURSE PRACTITIONER

## 2019-10-30 PROCEDURE — 99213 OFFICE O/P EST LOW 20 MIN: CPT | Mod: 25,S$GLB,, | Performed by: NURSE PRACTITIONER

## 2019-10-30 PROCEDURE — 81025 POCT URINE PREGNANCY: ICD-10-PCS | Mod: S$GLB,,, | Performed by: NURSE PRACTITIONER

## 2019-10-30 PROCEDURE — 87210 POCT KOH: ICD-10-PCS | Mod: QW,S$GLB,, | Performed by: NURSE PRACTITIONER

## 2019-10-30 PROCEDURE — 81002 POCT URINE DIPSTICK WITHOUT MICROSCOPE: ICD-10-PCS | Mod: S$GLB,,, | Performed by: NURSE PRACTITIONER

## 2019-10-30 PROCEDURE — 81025 URINE PREGNANCY TEST: CPT | Mod: S$GLB,,, | Performed by: NURSE PRACTITIONER

## 2019-10-30 PROCEDURE — 3008F BODY MASS INDEX DOCD: CPT | Mod: CPTII,S$GLB,, | Performed by: NURSE PRACTITIONER

## 2019-10-30 PROCEDURE — 87210 SMEAR WET MOUNT SALINE/INK: CPT | Mod: QW,S$GLB,, | Performed by: NURSE PRACTITIONER

## 2019-10-30 PROCEDURE — 99213 PR OFFICE/OUTPT VISIT, EST, LEVL III, 20-29 MIN: ICD-10-PCS | Mod: 25,S$GLB,, | Performed by: NURSE PRACTITIONER

## 2019-10-30 PROCEDURE — 99999 PR PBB SHADOW E&M-EST. PATIENT-LVL III: CPT | Mod: PBBFAC,,, | Performed by: NURSE PRACTITIONER

## 2019-10-30 PROCEDURE — 81002 URINALYSIS NONAUTO W/O SCOPE: CPT | Mod: S$GLB,,, | Performed by: NURSE PRACTITIONER

## 2019-10-30 PROCEDURE — 99999 PR PBB SHADOW E&M-EST. PATIENT-LVL III: ICD-10-PCS | Mod: PBBFAC,,, | Performed by: NURSE PRACTITIONER

## 2019-10-30 RX ORDER — LEVOTHYROXINE SODIUM 88 UG/1
88 TABLET ORAL DAILY
Refills: 3 | COMMUNITY
Start: 2019-08-09 | End: 2020-08-17 | Stop reason: SDUPTHER

## 2019-10-30 RX ORDER — SPIRONOLACTONE 50 MG/1
50 TABLET, FILM COATED ORAL 2 TIMES DAILY
Refills: 3 | COMMUNITY
Start: 2019-08-09 | End: 2021-03-11

## 2019-10-30 RX ORDER — TIZANIDINE HYDROCHLORIDE 4 MG/1
4 CAPSULE, GELATIN COATED ORAL
COMMUNITY
Start: 2017-11-13 | End: 2019-10-30

## 2019-10-30 RX ORDER — CEFUROXIME AXETIL 500 MG/1
500 TABLET ORAL
COMMUNITY
Start: 2019-10-23 | End: 2019-11-02

## 2019-10-30 RX ORDER — TRAMADOL HYDROCHLORIDE 50 MG/1
TABLET ORAL
Refills: 0 | COMMUNITY
Start: 2019-10-24 | End: 2022-09-01

## 2019-10-30 RX ORDER — DULOXETIN HYDROCHLORIDE 60 MG/1
60 CAPSULE, DELAYED RELEASE ORAL DAILY
Refills: 0 | COMMUNITY
Start: 2019-10-14

## 2019-10-30 RX ORDER — ONDANSETRON 4 MG/1
TABLET, ORALLY DISINTEGRATING ORAL
Refills: 0 | COMMUNITY
Start: 2019-10-24

## 2019-10-30 RX ORDER — MELOXICAM 15 MG/1
TABLET ORAL
COMMUNITY
Start: 2019-10-25 | End: 2021-03-11

## 2019-11-01 NOTE — PROGRESS NOTES
Chief Complaint: Problem:     Chief Complaint   Patient presents with    Ovarian Cyst     bone; cyst found on ovary after CT done at ; c/o nausea and abdominal soreness; pt has not resumed normal activity and is still experiencing urinary urgency and frequency        (Dr. Benitez patient)    Last Pap:  10/2/2017      HPI:      Yanique Mcdermott is a 44 y.o.  who presents today for pelvic ultrasound for follow-up after recent visit to [facility] On 10/19/19, in which she was diagnosed with UTI and LEFT kidney stone.  She was put on PO Ceftin x 10 day course.  She denies vaginal d/c or odor.  States she spent several hours doing some gardening work around house day or two ago, and that's when she started having low abdominal pain along with intermittent nausea.  (she was given rx for Zofran to have prn)    On CT scan in E.R., there was incidental finding of ovarian cyst (pt unsure of laterality), and upon discharge, she was advised to follow-up with OBGYN office regarding ovarian cyst.  She and her  had  for a while and dated other people, but are now back together.    LMP: Patient's last menstrual period was 10/01/2019..    She is currently using: condoms for contraception.    Ms. Mcdermott is currently sexually active with a single male partner.   She declines STD screening today with her examination.    Past Medical History:   Diagnosis Date    Abnormal Pap smear of cervix     Hpv (Dr Benitez)    Anemia     Anxiety     Depression     History of kidney stones     HPV (human papilloma virus) infection     LEEP Done 12 (Dr Benitez)    Hx of thyroid nodule     Pelvic pain     PONV (postoperative nausea and vomiting)     Thyroid disease     Hypo    Tobacco use      Past Surgical History:   Procedure Laterality Date    BREAST AUGMENTATION      CERVICAL BIOPSY  W/ LOOP ELECTRODE EXCISION  2012    Dr Benitez    PELVIC LAPAROSCOPY  ,     THYROIDECTOMY, PARTIAL       WISDOM TOOTH EXTRACTION       Social History     Socioeconomic History    Marital status:      Spouse name: Not on file    Number of children: Not on file    Years of education: Not on file    Highest education level: Not on file   Occupational History    Not on file   Social Needs    Financial resource strain: Not on file    Food insecurity:     Worry: Not on file     Inability: Not on file    Transportation needs:     Medical: Not on file     Non-medical: Not on file   Tobacco Use    Smoking status: Current Every Day Smoker     Types: Cigarettes    Smokeless tobacco: Never Used   Substance and Sexual Activity    Alcohol use: Yes     Comment: Social    Drug use: No    Sexual activity: Yes     Partners: Male     Birth control/protection: Condom     Comment: :  Back with Ex   Lifestyle    Physical activity:     Days per week: Not on file     Minutes per session: Not on file    Stress: Not on file   Relationships    Social connections:     Talks on phone: Not on file     Gets together: Not on file     Attends Yazidi service: Not on file     Active member of club or organization: Not on file     Attends meetings of clubs or organizations: Not on file     Relationship status: Not on file   Other Topics Concern    Not on file   Social History Narrative    Not on file     Family History   Problem Relation Age of Onset    Prostate cancer Paternal Grandfather     Cancer Paternal Grandfather     Breast cancer Maternal Grandmother     Cancer Maternal Grandmother     Alzheimer's disease Maternal Grandmother     Colon cancer Maternal Grandfather     Cancer Maternal Grandfather     Pancreatic cancer Maternal Grandfather     Cancer Mother     Brain cancer Mother     Ovarian cancer Neg Hx      OB History        2    Para   2    Term   2            AB        Living   2       SAB        TAB        Ectopic        Multiple        Live Births   2            "Obstetric Comments   Menarche 12             ROS:     GENERAL: Denies unintentional weight gain or weight loss. Feeling well overall.   SKIN: Denies rash or lesions.   HEENT: Denies headaches, or vision changes.   CARDIOVASCULAR: Denies palpitations or chest pain.   RESPIRATORY: Denies shortness of breath or dyspnea on exertion.  BREASTS: Denies pain, lumps, or nipple discharge.   ABDOMEN: Denies abdominal pain, constipation, diarrhea, nausea, vomiting, change in appetite.  URINARY: See HPI.  NEUROLOGIC: Denies syncope or weakness.   PSYCHIATRIC: Denies depression, anxiety or mood swings.  REPRODUCTIVE:  See HPI.    Physical Exam:      BP 98/74   Ht 5' 4" (1.626 m)   Wt 64.9 kg (143 lb 3 oz)   LMP 10/01/2019 Comment: condoms   BMI 24.58 kg/m²   Body mass index is 24.58 kg/m².     APPEARANCE: Well nourished, well developed, in no acute distress.  PSYCH: Appropriate mood and affect.  CHEST: Normal respiratory effort.  ABDOMEN: Soft.  No tenderness or masses.  No CVA tenderness.  PELVIC: Normal external genitalia without lesions.  Normal hair distribution.  Adequate perineal body, normal urethral meatus.  Vagina moist and well rugated without lesions; (+) small amt white discharge - KOH/WetPrep collected.  Cervix pink, without lesions, discharge or tenderness.  No significant cystocele or rectocele.  Bimanual exam shows uterus to be normal size, regular, mobile and nontender.  Adnexa without masses or tenderness.      Results:      Urine dipstick shows:   positive for trace RBC's and positive for trace leukocytes.    Urine pregnancy test:  NEG    WALESKA/Wet Prep results:  BV:   neg,  Candida:  neg,  Trichomonas:   neg       (See full results under LABS in Epic)    Assessment/Plan:     Nausea  -     POCT urine pregnancy    Vaginal discharge  -     POCT KOH  -     POCT WET PREP    Abnormal finding in urine  -     POCT urine dipstick without microscope      Counseling:     Follow up if symptoms worsen or fail to " improve.    Patient was counseled today on the new ACS guidelines for cervical cytology screening as well as the current recommendations for breast cancer screening.  Patient was also counseled on the recommendation for yearly pelvic exams, healthy diet and exercise routines, and breast self awareness.  She is to see her PCP for other health maintenance.  Counseling session lasted approximately 10 minutes, and all her questions were answered.    * Use of the [facility] Patient Portal discussed and encouraged during today's visit.     * Patient aware she will be notified of any results from today's visit once they have been reviewed by Provider, either via her [facility] patient portal, or telephone call.

## 2020-08-17 RX ORDER — LEVOTHYROXINE SODIUM 88 UG/1
88 TABLET ORAL DAILY
Qty: 30 TABLET | Refills: 0 | Status: SHIPPED | OUTPATIENT
Start: 2020-08-17 | End: 2020-09-10

## 2020-08-17 NOTE — TELEPHONE ENCOUNTER
Bozena LEAL Staff 5 hours ago (10:01 AM)     Dr Benitez pt calling, pt can't get in touch with her pcp to get her levothyroxine (SYNTHROID) 88 MCG tablet refill but can't get in touch with them it's been 2wks. Pt wants to know if you can refill this for her this one time.Please call pt to let her know. [facility]/pharmacy #[id number] - [address] [place] Pt # [phone]    Routing comment       Yanique Mcdermott [phone]  Bozena Mortensen 5 hours ago (10:00 AM)

## 2021-03-11 ENCOUNTER — OFFICE VISIT (OUTPATIENT)
Dept: OBSTETRICS AND GYNECOLOGY | Facility: CLINIC | Age: 46
End: 2021-03-11
Attending: OBSTETRICS & GYNECOLOGY
Payer: MEDICARE

## 2021-03-11 VITALS
BODY MASS INDEX: 24.1 KG/M2 | DIASTOLIC BLOOD PRESSURE: 82 MMHG | TEMPERATURE: 98 F | SYSTOLIC BLOOD PRESSURE: 138 MMHG | HEIGHT: 64 IN | WEIGHT: 141.13 LBS

## 2021-03-11 DIAGNOSIS — G47.00 INSOMNIA, UNSPECIFIED TYPE: ICD-10-CM

## 2021-03-11 DIAGNOSIS — R61 NIGHT SWEATS: ICD-10-CM

## 2021-03-11 DIAGNOSIS — Z12.4 ENCOUNTER FOR PAPANICOLAOU SMEAR FOR CERVICAL CANCER SCREENING: ICD-10-CM

## 2021-03-11 DIAGNOSIS — N64.4 BREAST PAIN: ICD-10-CM

## 2021-03-11 DIAGNOSIS — Z12.31 BREAST CANCER SCREENING BY MAMMOGRAM: ICD-10-CM

## 2021-03-11 DIAGNOSIS — Z11.51 ENCOUNTER FOR SCREENING FOR HUMAN PAPILLOMAVIRUS (HPV): ICD-10-CM

## 2021-03-11 DIAGNOSIS — Z01.419 ENCOUNTER FOR GYNECOLOGICAL EXAMINATION: Primary | ICD-10-CM

## 2021-03-11 PROCEDURE — 99999 PR PBB SHADOW E&M-EST. PATIENT-LVL III: CPT | Mod: PBBFAC,,, | Performed by: OBSTETRICS & GYNECOLOGY

## 2021-03-11 PROCEDURE — 87624 HPV HI-RISK TYP POOLED RSLT: CPT | Performed by: OBSTETRICS & GYNECOLOGY

## 2021-03-11 PROCEDURE — 88175 CYTOPATH C/V AUTO FLUID REDO: CPT | Performed by: OBSTETRICS & GYNECOLOGY

## 2021-03-11 PROCEDURE — 99999 PR PBB SHADOW E&M-EST. PATIENT-LVL III: ICD-10-PCS | Mod: PBBFAC,,, | Performed by: OBSTETRICS & GYNECOLOGY

## 2021-03-11 PROCEDURE — 3008F BODY MASS INDEX DOCD: CPT | Mod: CPTII,S$GLB,, | Performed by: OBSTETRICS & GYNECOLOGY

## 2021-03-11 PROCEDURE — 1126F AMNT PAIN NOTED NONE PRSNT: CPT | Mod: S$GLB,,, | Performed by: OBSTETRICS & GYNECOLOGY

## 2021-03-11 PROCEDURE — 1126F PR PAIN SEVERITY QUANTIFIED, NO PAIN PRESENT: ICD-10-PCS | Mod: S$GLB,,, | Performed by: OBSTETRICS & GYNECOLOGY

## 2021-03-11 PROCEDURE — 3008F PR BODY MASS INDEX (BMI) DOCUMENTED: ICD-10-PCS | Mod: CPTII,S$GLB,, | Performed by: OBSTETRICS & GYNECOLOGY

## 2021-03-11 PROCEDURE — G0101 CA SCREEN;PELVIC/BREAST EXAM: HCPCS | Mod: S$GLB,,, | Performed by: OBSTETRICS & GYNECOLOGY

## 2021-03-11 PROCEDURE — G0101 PR CA SCREEN;PELVIC/BREAST EXAM: ICD-10-PCS | Mod: S$GLB,,, | Performed by: OBSTETRICS & GYNECOLOGY

## 2021-03-11 RX ORDER — FLUTICASONE PROPIONATE 50 MCG
2 SPRAY, SUSPENSION (ML) NASAL DAILY
COMMUNITY
Start: 2021-01-22

## 2021-03-11 RX ORDER — DULOXETIN HYDROCHLORIDE 30 MG/1
30 CAPSULE, DELAYED RELEASE ORAL DAILY
COMMUNITY
Start: 2021-02-24 | End: 2022-09-01

## 2021-03-11 RX ORDER — PROGESTERONE 100 MG/1
100 CAPSULE ORAL NIGHTLY
Qty: 90 CAPSULE | Refills: 3 | Status: SHIPPED | OUTPATIENT
Start: 2021-03-11 | End: 2022-03-24

## 2021-03-11 RX ORDER — ARIPIPRAZOLE 5 MG/1
5 TABLET ORAL DAILY
COMMUNITY
Start: 2021-02-24 | End: 2022-09-01

## 2021-03-11 RX ORDER — OMEPRAZOLE 40 MG/1
CAPSULE, DELAYED RELEASE ORAL
COMMUNITY
Start: 2021-01-22 | End: 2023-01-23

## 2021-03-11 RX ORDER — MELOXICAM 7.5 MG/1
7.5 TABLET ORAL DAILY
COMMUNITY
Start: 2021-02-19 | End: 2021-05-14

## 2021-03-19 LAB
CLINICAL INFO: NORMAL
CYTO CVX: NORMAL
CYTOLOGIST CVX/VAG CYTO: NORMAL
CYTOLOGY CMNT CVX/VAG CYTO-IMP: NORMAL
CYTOLOGY PAP THIN PREP EXPLANATION: NORMAL
DATE OF PREVIOUS PAP: NORMAL
DATE PREVIOUS BX: NO
HPV I/H RISK 4 DNA CVX QL NAA+PROBE: NOT DETECTED
LMP START DATE: NORMAL
SPECIMEN SOURCE CVX/VAG CYTO: NORMAL
STAT OF ADQ CVX/VAG CYTO-IMP: NORMAL

## 2021-03-29 ENCOUNTER — TELEPHONE (OUTPATIENT)
Dept: OBSTETRICS AND GYNECOLOGY | Facility: CLINIC | Age: 46
End: 2021-03-29

## 2021-04-26 ENCOUNTER — TELEPHONE (OUTPATIENT)
Dept: OBSTETRICS AND GYNECOLOGY | Facility: CLINIC | Age: 46
End: 2021-04-26

## 2021-04-26 DIAGNOSIS — N76.0 ACUTE VAGINITIS: Primary | ICD-10-CM

## 2021-04-26 RX ORDER — FLUCONAZOLE 150 MG/1
150 TABLET ORAL DAILY
Qty: 2 TABLET | Refills: 0 | Status: SHIPPED | OUTPATIENT
Start: 2021-04-26 | End: 2021-04-28

## 2021-04-29 ENCOUNTER — APPOINTMENT (OUTPATIENT)
Dept: RADIOLOGY | Facility: OTHER | Age: 46
End: 2021-04-29
Attending: OBSTETRICS & GYNECOLOGY
Payer: MEDICARE

## 2021-04-29 VITALS — BODY MASS INDEX: 24.1 KG/M2 | HEIGHT: 64 IN | WEIGHT: 141.13 LBS

## 2021-04-29 DIAGNOSIS — Z12.31 BREAST CANCER SCREENING BY MAMMOGRAM: ICD-10-CM

## 2021-04-29 PROCEDURE — 77067 MAMMO DIGITAL SCREENING BILAT WITH TOMO: ICD-10-PCS | Mod: 26,,, | Performed by: RADIOLOGY

## 2021-04-29 PROCEDURE — 77063 BREAST TOMOSYNTHESIS BI: CPT | Mod: 26,,, | Performed by: RADIOLOGY

## 2021-04-29 PROCEDURE — 77067 SCR MAMMO BI INCL CAD: CPT | Mod: TC,PN

## 2021-04-29 PROCEDURE — 77063 MAMMO DIGITAL SCREENING BILAT WITH TOMO: ICD-10-PCS | Mod: 26,,, | Performed by: RADIOLOGY

## 2021-04-29 PROCEDURE — 77067 SCR MAMMO BI INCL CAD: CPT | Mod: 26,,, | Performed by: RADIOLOGY

## 2021-05-12 ENCOUNTER — PATIENT MESSAGE (OUTPATIENT)
Dept: ORTHOPEDICS | Facility: CLINIC | Age: 46
End: 2021-05-12

## 2021-05-14 ENCOUNTER — HOSPITAL ENCOUNTER (OUTPATIENT)
Dept: RADIOLOGY | Facility: HOSPITAL | Age: 46
Discharge: HOME | End: 2021-05-14
Attending: ORTHOPAEDIC SURGERY
Payer: MEDICARE

## 2021-05-14 ENCOUNTER — OFFICE VISIT (OUTPATIENT)
Dept: ORTHOPEDICS | Facility: CLINIC | Age: 46
End: 2021-05-14
Payer: MEDICARE

## 2021-05-14 VITALS — BODY MASS INDEX: 24.57 KG/M2 | WEIGHT: 143.94 LBS | HEIGHT: 64 IN

## 2021-05-14 DIAGNOSIS — M51.36 DDD (DEGENERATIVE DISC DISEASE), LUMBAR: ICD-10-CM

## 2021-05-14 DIAGNOSIS — M46.1 SACROILIITIS: Primary | ICD-10-CM

## 2021-05-14 PROCEDURE — 99999 PR PBB SHADOW E&M-EST. PATIENT-LVL III: ICD-10-PCS | Mod: PBBFAC,,, | Performed by: ORTHOPAEDIC SURGERY

## 2021-05-14 PROCEDURE — 1125F AMNT PAIN NOTED PAIN PRSNT: CPT | Mod: S$GLB,,, | Performed by: ORTHOPAEDIC SURGERY

## 2021-05-14 PROCEDURE — 99999 PR PBB SHADOW E&M-EST. PATIENT-LVL III: CPT | Mod: PBBFAC,,, | Performed by: ORTHOPAEDIC SURGERY

## 2021-05-14 PROCEDURE — 3008F BODY MASS INDEX DOCD: CPT | Mod: CPTII,S$GLB,, | Performed by: ORTHOPAEDIC SURGERY

## 2021-05-14 PROCEDURE — 1125F PR PAIN SEVERITY QUANTIFIED, PAIN PRESENT: ICD-10-PCS | Mod: S$GLB,,, | Performed by: ORTHOPAEDIC SURGERY

## 2021-05-14 PROCEDURE — 72110 XR LUMBAR SPINE AP AND LAT WITH FLEX/EXT: ICD-10-PCS | Mod: 26,,, | Performed by: RADIOLOGY

## 2021-05-14 PROCEDURE — 99204 PR OFFICE/OUTPT VISIT, NEW, LEVL IV, 45-59 MIN: ICD-10-PCS | Mod: S$GLB,,, | Performed by: ORTHOPAEDIC SURGERY

## 2021-05-14 PROCEDURE — 72110 X-RAY EXAM L-2 SPINE 4/>VWS: CPT | Mod: 26,,, | Performed by: RADIOLOGY

## 2021-05-14 PROCEDURE — 99204 OFFICE O/P NEW MOD 45 MIN: CPT | Mod: S$GLB,,, | Performed by: ORTHOPAEDIC SURGERY

## 2021-05-14 PROCEDURE — 3008F PR BODY MASS INDEX (BMI) DOCUMENTED: ICD-10-PCS | Mod: CPTII,S$GLB,, | Performed by: ORTHOPAEDIC SURGERY

## 2021-05-14 PROCEDURE — 72110 X-RAY EXAM L-2 SPINE 4/>VWS: CPT | Mod: TC

## 2021-05-14 RX ORDER — TIZANIDINE 4 MG/1
4 TABLET ORAL EVERY 8 HOURS
Qty: 30 TABLET | Refills: 0 | Status: SHIPPED | OUTPATIENT
Start: 2021-05-14 | End: 2021-05-24

## 2021-05-14 RX ORDER — DICLOFENAC SODIUM 75 MG/1
75 TABLET, DELAYED RELEASE ORAL 2 TIMES DAILY
Qty: 60 TABLET | Refills: 0 | Status: SHIPPED | OUTPATIENT
Start: 2021-05-14 | End: 2021-06-10

## 2021-05-18 ENCOUNTER — TELEPHONE (OUTPATIENT)
Dept: PAIN MEDICINE | Facility: CLINIC | Age: 46
End: 2021-05-18

## 2022-08-01 ENCOUNTER — TELEPHONE (OUTPATIENT)
Dept: OBSTETRICS AND GYNECOLOGY | Facility: CLINIC | Age: 47
End: 2022-08-01
Payer: MEDICARE

## 2022-08-01 DIAGNOSIS — Z12.31 BREAST CANCER SCREENING BY MAMMOGRAM: Primary | ICD-10-CM

## 2022-08-01 NOTE — TELEPHONE ENCOUNTER
Please link orders   I signed them         Bone pt needs mmg orders linked to appt 8-10-22 and annual 1-23-23

## 2022-08-10 ENCOUNTER — APPOINTMENT (OUTPATIENT)
Dept: RADIOLOGY | Facility: OTHER | Age: 47
End: 2022-08-10
Attending: OBSTETRICS & GYNECOLOGY
Payer: MEDICARE

## 2022-08-10 VITALS — WEIGHT: 143.94 LBS | HEIGHT: 64 IN | BODY MASS INDEX: 24.57 KG/M2

## 2022-08-10 DIAGNOSIS — Z12.31 BREAST CANCER SCREENING BY MAMMOGRAM: ICD-10-CM

## 2022-08-10 PROCEDURE — 77063 BREAST TOMOSYNTHESIS BI: CPT | Mod: TC,PN

## 2022-08-10 PROCEDURE — 77063 MAMMO DIGITAL SCREENING BILAT WITH TOMO: ICD-10-PCS | Mod: 26,,, | Performed by: RADIOLOGY

## 2022-08-10 PROCEDURE — 77063 BREAST TOMOSYNTHESIS BI: CPT | Mod: 26,,, | Performed by: RADIOLOGY

## 2022-08-10 PROCEDURE — 77067 SCR MAMMO BI INCL CAD: CPT | Mod: 26,,, | Performed by: RADIOLOGY

## 2022-08-10 PROCEDURE — 77067 MAMMO DIGITAL SCREENING BILAT WITH TOMO: ICD-10-PCS | Mod: 26,,, | Performed by: RADIOLOGY

## 2022-08-10 PROCEDURE — 77067 SCR MAMMO BI INCL CAD: CPT | Mod: TC,PN

## 2022-08-11 NOTE — PROGRESS NOTES
Just wanted to let you know that I got your mammogram results back and the radiologist reading is perfectly normal. Let me know if you have any questions or concerns  Hope you have a great day!  Dr Benitez

## 2022-08-29 ENCOUNTER — OFFICE VISIT (OUTPATIENT)
Dept: ORTHOPEDICS | Facility: CLINIC | Age: 47
End: 2022-08-29
Payer: MEDICARE

## 2022-08-29 ENCOUNTER — HOSPITAL ENCOUNTER (OUTPATIENT)
Dept: RADIOLOGY | Facility: HOSPITAL | Age: 47
Discharge: HOME | End: 2022-08-29
Attending: ORTHOPAEDIC SURGERY
Payer: MEDICARE

## 2022-08-29 VITALS — BODY MASS INDEX: 25.77 KG/M2 | WEIGHT: 150.13 LBS

## 2022-08-29 DIAGNOSIS — M50.30 DDD (DEGENERATIVE DISC DISEASE), CERVICAL: ICD-10-CM

## 2022-08-29 DIAGNOSIS — M54.50 CHRONIC BILATERAL LOW BACK PAIN WITHOUT SCIATICA: Primary | ICD-10-CM

## 2022-08-29 DIAGNOSIS — M50.30 DDD (DEGENERATIVE DISC DISEASE), CERVICAL: Primary | ICD-10-CM

## 2022-08-29 DIAGNOSIS — G89.29 CHRONIC BILATERAL LOW BACK PAIN WITHOUT SCIATICA: Primary | ICD-10-CM

## 2022-08-29 PROCEDURE — 99999 PR PBB SHADOW E&M-EST. PATIENT-LVL I: CPT | Mod: PBBFAC,,, | Performed by: ORTHOPAEDIC SURGERY

## 2022-08-29 PROCEDURE — 3008F PR BODY MASS INDEX (BMI) DOCUMENTED: ICD-10-PCS | Mod: CPTII,S$GLB,, | Performed by: ORTHOPAEDIC SURGERY

## 2022-08-29 PROCEDURE — 99999 PR PBB SHADOW E&M-EST. PATIENT-LVL I: ICD-10-PCS | Mod: PBBFAC,,, | Performed by: ORTHOPAEDIC SURGERY

## 2022-08-29 PROCEDURE — 99213 PR OFFICE/OUTPT VISIT, EST, LEVL III, 20-29 MIN: ICD-10-PCS | Mod: S$GLB,,, | Performed by: ORTHOPAEDIC SURGERY

## 2022-08-29 PROCEDURE — 72050 X-RAY EXAM NECK SPINE 4/5VWS: CPT | Mod: TC

## 2022-08-29 PROCEDURE — 72050 XR CERVICAL SPINE AP LAT WITH FLEX EXTEN: ICD-10-PCS | Mod: 26,,, | Performed by: RADIOLOGY

## 2022-08-29 PROCEDURE — 99213 OFFICE O/P EST LOW 20 MIN: CPT | Mod: S$GLB,,, | Performed by: ORTHOPAEDIC SURGERY

## 2022-08-29 PROCEDURE — 3008F BODY MASS INDEX DOCD: CPT | Mod: CPTII,S$GLB,, | Performed by: ORTHOPAEDIC SURGERY

## 2022-08-29 PROCEDURE — 72050 X-RAY EXAM NECK SPINE 4/5VWS: CPT | Mod: 26,,, | Performed by: RADIOLOGY

## 2022-08-29 NOTE — PROGRESS NOTES
DATE: 8/29/2022  PATIENT: Yanique Mcdermott    Attending Physician: Andry Laguerre MD    HISTORY:  Yanique Mcdermott is a 47 y.o. female who returns to me today for follow up.  She was last seen by me 5/14/2021.  Today she is doing well but notes she continues to have low back pain. She reports seeing and outside pain management doctor and was given bilateral GTB injections with minimal relief. She has also tried PT with no relief.    The Patient denies myelopathic symptoms such as handwriting changes or difficulty with buttons/coins/keys. Denies perineal paresthesias, bowel/bladder dysfunction.    PMH/PSH/FamHx/SocHx:  Unchanged from prior visit    ROS:  REVIEW OF SYSTEMS:  Constitution: Negative. Negative for chills, fever and night sweats.   HENT: Negative for congestion and headaches.    Eyes: Negative for blurred vision, left vision loss and right vision loss.   Cardiovascular: Negative for chest pain and syncope.   Respiratory: Negative for cough and shortness of breath.    Endocrine: Negative for polydipsia, polyphagia and polyuria.   Hematologic/Lymphatic: Negative for bleeding problem. Does not bruise/bleed easily.   Skin: Negative for dry skin, itching and rash.   Musculoskeletal: Negative for falls and muscle weakness.   Gastrointestinal: Negative for abdominal pain and bowel incontinence.   Allergic/Immunologic: Negative for hives and persistent infections.  Genitourinary: Negative for urinary retention/incontinence and nocturia.   Neurological: negative for disturbances in coordination, no myelopathic symptoms such as handwriting changes or difficulty with buttons, coins, keys or small objects. No loss of balance and seizures.   Psychiatric/Behavioral: Negative for depression. The patient does not have insomnia.   Denies perineal paresthesias, bowel or bladder incontinence    EXAM:  There were no vitals taken for this visit.    My physical examination was notable for the following findings:      Musculoskeletal and neuro exam stable.      IMAGING:  No new imaging today.    Today I personally re- reviewed AP, Lat and Flex/Ex  upright L-spine that demonstrate mild disc space narrowing and degenerative changes without instability.     MRI lumbar (outside facility) demonstrates facet arthropathy at L4-L5 and L5-S1. No significant spinal stenosis or neural foraminal narrowing.     Xray cervical AP, Lat, Flex/ex demonstrates mild degenerative changes.    There is no height or weight on file to calculate BMI.    No results found for: HGBA1C      ASSESSMENT/PLAN:    There are no diagnoses linked to this encounter.    Today we discussed at length all of the different treatment options including anti-inflammatories, acetaminophen, rest, ice, heat, physical therapy including strengthening and stretching exercises, home exercises, ROM, aerobic conditioning, aqua therapy, other modalities including ultrasound, massage, and dry needling, epidural steroid injections and finally surgical intervention.      Pt presents with chronic low back pain. Failure of conservative rx. No surgical intervention indicated at this time. Will schedule in pain management clinic to discuss ESIs vs MBBs/RFAs.

## 2022-09-01 ENCOUNTER — TELEPHONE (OUTPATIENT)
Dept: PAIN MEDICINE | Facility: CLINIC | Age: 47
End: 2022-09-01

## 2022-09-01 ENCOUNTER — OFFICE VISIT (OUTPATIENT)
Dept: PAIN MEDICINE | Facility: CLINIC | Age: 47
End: 2022-09-01
Payer: MEDICARE

## 2022-09-01 VITALS
RESPIRATION RATE: 18 BRPM | BODY MASS INDEX: 25.2 KG/M2 | DIASTOLIC BLOOD PRESSURE: 78 MMHG | SYSTOLIC BLOOD PRESSURE: 114 MMHG | WEIGHT: 147.63 LBS | HEART RATE: 121 BPM | HEIGHT: 64 IN

## 2022-09-01 DIAGNOSIS — M53.3 SACROILIAC JOINT PAIN: ICD-10-CM

## 2022-09-01 DIAGNOSIS — G89.29 CHRONIC BILATERAL LOW BACK PAIN WITHOUT SCIATICA: Primary | ICD-10-CM

## 2022-09-01 DIAGNOSIS — M46.1 SACROILIITIS: Primary | ICD-10-CM

## 2022-09-01 DIAGNOSIS — M54.50 CHRONIC BILATERAL LOW BACK PAIN WITHOUT SCIATICA: Primary | ICD-10-CM

## 2022-09-01 DIAGNOSIS — M53.3 COCCYDYNIA: ICD-10-CM

## 2022-09-01 DIAGNOSIS — M47.816 LUMBAR SPONDYLOSIS: ICD-10-CM

## 2022-09-01 DIAGNOSIS — G89.4 CHRONIC PAIN SYNDROME: ICD-10-CM

## 2022-09-01 PROCEDURE — 1159F MED LIST DOCD IN RCRD: CPT | Mod: CPTII,S$GLB,, | Performed by: PHYSICAL MEDICINE & REHABILITATION

## 2022-09-01 PROCEDURE — 3074F SYST BP LT 130 MM HG: CPT | Mod: CPTII,S$GLB,, | Performed by: PHYSICAL MEDICINE & REHABILITATION

## 2022-09-01 PROCEDURE — 1159F PR MEDICATION LIST DOCUMENTED IN MEDICAL RECORD: ICD-10-PCS | Mod: CPTII,S$GLB,, | Performed by: PHYSICAL MEDICINE & REHABILITATION

## 2022-09-01 PROCEDURE — 99204 OFFICE O/P NEW MOD 45 MIN: CPT | Mod: S$GLB,,, | Performed by: PHYSICAL MEDICINE & REHABILITATION

## 2022-09-01 PROCEDURE — 99999 PR PBB SHADOW E&M-EST. PATIENT-LVL V: CPT | Mod: PBBFAC,,, | Performed by: PHYSICAL MEDICINE & REHABILITATION

## 2022-09-01 PROCEDURE — 1160F PR REVIEW ALL MEDS BY PRESCRIBER/CLIN PHARMACIST DOCUMENTED: ICD-10-PCS | Mod: CPTII,S$GLB,, | Performed by: PHYSICAL MEDICINE & REHABILITATION

## 2022-09-01 PROCEDURE — 3008F BODY MASS INDEX DOCD: CPT | Mod: CPTII,S$GLB,, | Performed by: PHYSICAL MEDICINE & REHABILITATION

## 2022-09-01 PROCEDURE — 3078F PR MOST RECENT DIASTOLIC BLOOD PRESSURE < 80 MM HG: ICD-10-PCS | Mod: CPTII,S$GLB,, | Performed by: PHYSICAL MEDICINE & REHABILITATION

## 2022-09-01 PROCEDURE — 3074F PR MOST RECENT SYSTOLIC BLOOD PRESSURE < 130 MM HG: ICD-10-PCS | Mod: CPTII,S$GLB,, | Performed by: PHYSICAL MEDICINE & REHABILITATION

## 2022-09-01 PROCEDURE — 3008F PR BODY MASS INDEX (BMI) DOCUMENTED: ICD-10-PCS | Mod: CPTII,S$GLB,, | Performed by: PHYSICAL MEDICINE & REHABILITATION

## 2022-09-01 PROCEDURE — 99204 PR OFFICE/OUTPT VISIT, NEW, LEVL IV, 45-59 MIN: ICD-10-PCS | Mod: S$GLB,,, | Performed by: PHYSICAL MEDICINE & REHABILITATION

## 2022-09-01 PROCEDURE — 3078F DIAST BP <80 MM HG: CPT | Mod: CPTII,S$GLB,, | Performed by: PHYSICAL MEDICINE & REHABILITATION

## 2022-09-01 PROCEDURE — 99999 PR PBB SHADOW E&M-EST. PATIENT-LVL V: ICD-10-PCS | Mod: PBBFAC,,, | Performed by: PHYSICAL MEDICINE & REHABILITATION

## 2022-09-01 PROCEDURE — 1160F RVW MEDS BY RX/DR IN RCRD: CPT | Mod: CPTII,S$GLB,, | Performed by: PHYSICAL MEDICINE & REHABILITATION

## 2022-09-01 RX ORDER — ASPIRIN 325 MG
50000 TABLET, DELAYED RELEASE (ENTERIC COATED) ORAL
COMMUNITY
Start: 2022-06-15

## 2022-09-01 RX ORDER — TIZANIDINE 4 MG/1
4 TABLET ORAL NIGHTLY PRN
Qty: 30 TABLET | Refills: 2 | Status: SHIPPED | OUTPATIENT
Start: 2022-09-01 | End: 2022-12-01

## 2022-09-01 RX ORDER — MELOXICAM 15 MG/1
15 TABLET ORAL DAILY
COMMUNITY
Start: 2022-05-06

## 2022-09-01 RX ORDER — AZELASTINE 1 MG/ML
SPRAY, METERED NASAL
COMMUNITY
Start: 2022-06-21

## 2022-09-01 NOTE — PROGRESS NOTES
[facility]  New Patient H&P    Chief Complaint:   Chief Complaint   Patient presents with    Back Pain       History of Present Illness: Yanique Mcdermott is a 47 y.o. female here for LBP.      Onset: Back pain started after her 18 year old was born.   Location: diffusely in the lumbar and sacral area and over her coccyx  Radiation: bilaterally, worse on the right to the mid-thigh above the knee  Timing: constant  Quality: Aching, Burning, Throbbing, and Deep  Exacerbating Factors: exercise, flexion, and lifting, leaning back, being one position too long  Alleviating Factors: heat, ice, laying down, massage, medications, physical therapy, and rest, and repositioning  Associated Symptoms: She denies night fever/night sweats, urinary incontinence, bowel incontinence, significant weight loss, significant motor weakness, and loss of sensations    She has tried chiropractor, physical therapy, bracing, acupuncture, medications, heat/cold. She found that pain pills help the most.     Severity: Currently: 8/10   Typical Range: 6-9/10     Exacerbation: 9/10     Pain Disability Index  Family/Home Responsibilities:: 8  Recreation:: 8  Social Activity:: 8  Occupation:: 8  Sexual Behavior:: 8  Self Care:: 8  Life-Support Activities:: 8  Pain Disability Index (PDI): 56    Previous Interventions:  -     Previous Therapies:  PT/OT: yes   Relevant Surgery: no   Previous Medications:   - NSAIDS: Diclofenac, ibuprofen, tylenol, meloxicam.  - Muscle Relaxants: skelaxin.  Soma caused hives. Tizanidine helped in the past.   - TCAs:   - SNRIs: Cymbalta  - Topicals: voltaren  - Anticonvulsants:    - Opioids: Allergy to codeine. Tramadol    Current Pain Medications:  Cymbalta 60 mg daily  Voltaren 75 mg   Tramadol     Blood Thinners: None    Full Medication List:    Current Outpatient Medications:     azelastine (ASTELIN) 137 mcg (0.1 %) nasal spray, INSTILL 1 SPRAY INTO EACH NOSTRIL TWICE A DAY FOR 30 DAYS, Disp: , Rfl:      cholecalciferol, vitamin D3, 1,250 mcg (50,000 unit) capsule, Take 50,000 Units by mouth every 7 days., Disp: , Rfl:     diclofenac (VOLTAREN) 75 MG EC tablet, TAKE 1 TABLET BY MOUTH TWICE A DAY, Disp: 60 tablet, Rfl: 0    DULoxetine (CYMBALTA) 60 MG capsule, Take 60 mg by mouth once daily., Disp: , Rfl: 0    flunisolide 25 mcg, 0.025%, (NASALIDE) 25 mcg (0.025 %) Spry, 2 sprays once daily., Disp: , Rfl: 11    fluticasone propionate (FLONASE) 50 mcg/actuation nasal spray, 2 sprays by Each Nostril route once daily., Disp: , Rfl:     levothyroxine (SYNTHROID) 88 MCG tablet, TAKE 1 TABLET BY MOUTH EVERY DAY, Disp: 30 tablet, Rfl: 0    meloxicam (MOBIC) 15 MG tablet, Take 15 mg by mouth once daily., Disp: , Rfl:     multivitamin (THERAGRAN) per tablet, Take 1 tablet by mouth once daily., Disp: , Rfl:     omeprazole (PRILOSEC) 40 MG capsule, SMARTSI Capsule(s) By Mouth Morning-Night, Disp: , Rfl:     ondansetron (ZOFRAN-ODT) 4 MG TbDL, DISSOLVE 1 TABLET IN MOUTH EVERY 8 HOURS FOR 3 DAYS, Disp: , Rfl: 0    progesterone (PROMETRIUM) 100 MG capsule, TAKE 1 CAPSULE BY MOUTH EVERY DAY IN THE EVENING, Disp: 90 capsule, Rfl: 3    tiZANidine (ZANAFLEX) 4 MG tablet, Take 1 tablet (4 mg total) by mouth nightly as needed (pain, muscle spasm)., Disp: 30 tablet, Rfl: 2     Review of Systems:  ROS    Allergies:  Minocin [minocycline], Soma [carisoprodol], Bactrim [sulfamethoxazole-trimethoprim], Clarithromycin, Codeine, and Sulfadiazine     Medical History:   has a past medical history of Abnormal Pap smear of cervix (), Anemia, Anxiety, Depression, Fibromyalgia, History of kidney stones, HPV (human papilloma virus) infection (), thyroid nodule (), Pelvic pain, PONV (postoperative nausea and vomiting), Thyroid disease, and Tobacco use.    Surgical History:   has a past surgical history that includes Thyroidectomy, partial (); BREAST AUGMENTATION (); Pelvic laparoscopy (, ); Weinert tooth extraction  "(1990); Cervical biopsy w/ loop electrode excision (05/24/2012); and Augmentation of breast.    Family History:  family history includes Alzheimer's disease in her maternal grandmother; Brain cancer in her mother; Breast cancer in her maternal aunt, maternal grandmother, and another family member; Cancer in her maternal grandfather, maternal grandmother, mother, and paternal grandfather; Colon cancer in her maternal grandfather; Diabetes in her father; Hypertension in her father; Pancreatic cancer in her maternal grandfather; Prostate cancer in her paternal grandfather.    Social History:   reports that she has been smoking vaping with nicotine. She has never used smokeless tobacco. She reports current alcohol use. She reports that she does not use drugs.    Physical Exam:  /78   Pulse (!) 121   Resp 18   Ht 5' 4" (1.626 m)   Wt 67 kg (147 lb 9.6 oz)   BMI 25.34 kg/m²   GEN: No acute distress. Calm, comfortable  HENT: Normocephalic, atraumatic, moist mucous membranes  EYE: Anicteric sclera, non-injected.   CV: Non-diaphoretic. Regular Rate. Radial Pulses 2+.  RESP: Breathing comfortably. Chest expansion symmetric.  EXT: No clubbing, cyanosis.   SKIN: Warm, & dry to palpation. No visible rashes or lesions of exposed skin.   PSYCH: Pleasant mood and appropriate affect. Recent and remote memory intact.   GAIT: Independent, normal ambulation  Lumbar Spine Exam:       Inspection: No erythema, bruising.       Palpation: (+) TTP of b/l SIJ > bilateral lumbar paraspinals. (+) sacrococcygeal area.       ROM: Not significantly Limited in flexion, extension, lateral bending.       (+) Facet loading bilaterally      (-) Straight Leg Raise bilaterally      (-) TRAMAINE bilaterally  Hip Exam:      Inspection: No gross deformity or apparent leg length discrepancy      Palpation: (+) TTP to bilateral greater trochanteric bursas.       ROM:  No limitation or pain in internal rotation, external rotation b/l  Neurologic " Exam:     Alert. Speech is fluent and appropriate.     Strength:  5/5 throughout bilateral lower extremities     Sensation:  Grossly intact to light touch in bilateral lower extremities     Reflexes: 2+ in b/l patella, achilles     Tone: No abnormality appreciated in bilateral lower extremities     No Clonus     Downgoing toes on plantar stimulation      Imaging:  - MRI Lumbar spine 8/17/22:  Anatomic alignment of the osseous structures of the lumbar spine is present. No evidence of spondylolysis or spondylolisthesis.  Straightening of the normal lumbar lordosis. This is likely secondary to some degree of muscle spasm or strain and may be related to trauma.  The bone marrow signal intensity of the osseous elements of the lumbar spine is normal. There is no evidence of fracture, bone marrow edema or bone marrow replacement process within the lumbar spine, visualized elements of the lower thoracic spine or the sacrum.  The anterior longitudinal spinal ligament, posterior longitudinal spinal ligament, ligamentum flavum, interspinous ligaments and supraspinal ligaments are intact at all locations.  The conus medullaris is normal in position and appearance. Its tip lies posterior to the cephalic aspect of the L1 vertebral body.  The prevertebral soft tissues are normal. The paraspinal musculature is normal. The visualized retroperitoneal soft tissues are normal.  T11-T12: Normal signal intensity within the nucleus pulposus and normal disc contour. No evidence of neural foraminal or central vertebral canal stenosis.      T12-L1: Normal signal intensity within the nucleus pulposus and normal disc contour. No evidence of neural foraminal or central vertebral canal stenosis.  L1-L2: Normal signal intensity within the nucleus pulposus and normal disc contour. No evidence of neural foraminal or central vertebral canal stenosis.   L2-L3: Normal signal intensity within the nucleus pulposus and normal disc contour. No evidence of  neural foraminal or central vertebral canal stenosis.  L3-L4: Diminished signal intensity in the nucleus pulposus with difuse bulge of the annulus fibrosis causing moderate bilateral neural foraminal stenosis which is equivalent side to side. No central vertebral canal stenosis.  L4-L5: Diminished signal intensity in the nucleus pulposus with difuse bulge of the annulus fibrosis causing moderate bilateral neural foraminal stenosis which is equivalent side to side. No central vertebral canal stenosis.  L5-S1: Diminished signal intensity in the nucleus pulposus with difuse bulge of the annulus fibrosis causing moderate bilateral neural foraminal stenosis which is equivalent side to side. No central vertebral canal stenosis.  The facet joints at all  levels of the lumbar spine exhibit mild-to-moderate changes of osteoarthritis consisting of thinning of the articular cartilage with periarticular sclerosis,  marginal osteophyte formation and ligamentum flavum hypertrophy. Bilateral facet arthropathy, most apparent at L3-S1.  IMPRESSION   1.  Straightening of the normal lumbar lordosis. This is likely secondary to some degree of muscle spasm or strain and may be related to trauma.   2. Neural foraminal stenosis as a consequence of diffuse buldge of the annulous fibrosis, bilaterally, L3-L4, L4-L5 and L5-S1. Findings are new and developed in the interval since the prior exam.   3.  Bilateral facet arthropathy, most apparent at L3-S1. Interval worsening since the prior exam.      - Xray Cervical Spine 08/29/2022:  FINDINGS:  Vertebral bodies are intact.  Mild narrowing of the C6-C7 disc space can be seen.  No instability in flexion and extension is noted.  No significant bony spurring is seen.     -Xray Lumbar Spine 05/14/2021:  FINDINGS:  Lumbar vertebral body heights are maintained.  Disc spaces are maintained.  Degenerative facet hypertrophic changes L4-5 and L5-S1.  AP alignment is anatomic.      Labs:  BMP  No results  found for: NA, K, CL, CO2, BUN, CREATININE, CALCIUM, ANIONGAP, ESTGFRAFRICA, EGFRNONAA  No results found for: ALT, AST, GGT, ALKPHOS, BILITOT  Lab Results   Component Value Date     02/07/2018       Assessment:  Yanique Mcdermott is a 47 y.o. female with the following diagnoses based on history, exam, and imaging:    Problem List Items Addressed This Visit    None  Visit Diagnoses       Chronic bilateral low back pain without sciatica    -  Primary    Relevant Medications    tiZANidine (ZANAFLEX) 4 MG tablet    Other Relevant Orders    Ambulatory referral/consult to Physical/Occupational Therapy    Sacroiliac joint pain        Relevant Medications    tiZANidine (ZANAFLEX) 4 MG tablet    Other Relevant Orders    Ambulatory referral/consult to Physical/Occupational Therapy    Coccydynia        Relevant Medications    tiZANidine (ZANAFLEX) 4 MG tablet    Other Relevant Orders    X-Ray Sacrum And Coccyx    Ambulatory referral/consult to Physical/Occupational Therapy    Chronic pain syndrome        Relevant Medications    tiZANidine (ZANAFLEX) 4 MG tablet    Lumbar spondylosis        Relevant Medications    tiZANidine (ZANAFLEX) 4 MG tablet    Other Relevant Orders    Ambulatory referral/consult to Physical/Occupational Therapy            This is a pleasant 47 y.o. lady presenting with:     - Chronic bilateral low back pain: Pain appears primarily due to SIJ dysfunction. Though facetogenic aspects as well on exam.  - Coccydynia  - Comorbidities: Anxiety and Depression. Tobacco use.     Treatment Plan:   - PT/OT/HEP: Refer to PT. Discussed benefits of exercise for pain.   - Procedures: Schedule bilateral SIJ CSI   - If no relief with lumbar pain, plan for bilateral L4-5, L5-S1 diagnostic MBBs   - If tail bone pain persists, plan for sacrococcygeal joint and ganglion impar block  - Medications: Rx for Tizanidine 4 mg qHS prn   - Imaging: Reviewed. Order x-ray of sacrum/coccyx  - Labs: Reviewed.      Follow Up:  RTC 2 weeks after procedure    Rica Gonzalez M.D.  Interventional Pain Medicine / Physical Medicine & Rehabilitation    Disclaimer: This note was partly generated using dictation software which may occasionally result in transcription errors.

## 2022-09-13 ENCOUNTER — HOSPITAL ENCOUNTER (OUTPATIENT)
Dept: RADIOLOGY | Facility: HOSPITAL | Age: 47
Discharge: HOME | End: 2022-09-13
Attending: PHYSICAL MEDICINE & REHABILITATION
Payer: MEDICARE

## 2022-09-13 DIAGNOSIS — M53.3 COCCYDYNIA: ICD-10-CM

## 2022-09-13 PROCEDURE — 72220 X-RAY EXAM SACRUM TAILBONE: CPT | Mod: 26,,, | Performed by: RADIOLOGY

## 2022-09-13 PROCEDURE — 72220 X-RAY EXAM SACRUM TAILBONE: CPT | Mod: TC

## 2022-09-13 PROCEDURE — 72220 XR SACRUM AND COCCYX: ICD-10-PCS | Mod: 26,,, | Performed by: RADIOLOGY

## 2022-09-14 ENCOUNTER — HOSPITAL ENCOUNTER (OUTPATIENT)
Facility: HOSPITAL | Age: 47
Discharge: HOME | End: 2022-09-14
Attending: PHYSICAL MEDICINE & REHABILITATION | Admitting: PHYSICAL MEDICINE & REHABILITATION
Payer: MEDICARE

## 2022-09-14 VITALS
HEART RATE: 85 BPM | DIASTOLIC BLOOD PRESSURE: 78 MMHG | BODY MASS INDEX: 24.75 KG/M2 | RESPIRATION RATE: 16 BRPM | TEMPERATURE: 97 F | HEIGHT: 64 IN | OXYGEN SATURATION: 100 % | SYSTOLIC BLOOD PRESSURE: 121 MMHG | WEIGHT: 145 LBS

## 2022-09-14 DIAGNOSIS — M53.3 SACROILIAC JOINT PAIN: Primary | ICD-10-CM

## 2022-09-14 DIAGNOSIS — R52 PAIN: ICD-10-CM

## 2022-09-14 PROBLEM — M46.1 SACROILIITIS: Status: ACTIVE | Noted: 2022-09-14

## 2022-09-14 LAB
B-HCG UR QL: NEGATIVE
CTP QC/QA: YES

## 2022-09-14 PROCEDURE — 81025 URINE PREGNANCY TEST: CPT | Performed by: PHYSICAL MEDICINE & REHABILITATION

## 2022-09-14 PROCEDURE — 27096 INJECT SACROILIAC JOINT: CPT | Mod: 50,,, | Performed by: PHYSICAL MEDICINE & REHABILITATION

## 2022-09-14 PROCEDURE — 25000003 PHARM REV CODE 250: Performed by: PHYSICAL MEDICINE & REHABILITATION

## 2022-09-14 PROCEDURE — 63600175 PHARM REV CODE 636 W HCPCS: Performed by: PHYSICAL MEDICINE & REHABILITATION

## 2022-09-14 PROCEDURE — 27096 PR INJECTION,SACROILIAC JOINT: ICD-10-PCS | Mod: 50,,, | Performed by: PHYSICAL MEDICINE & REHABILITATION

## 2022-09-14 PROCEDURE — 27096 INJECT SACROILIAC JOINT: CPT | Mod: 50 | Performed by: PHYSICAL MEDICINE & REHABILITATION

## 2022-09-14 PROCEDURE — 25500020 PHARM REV CODE 255: Performed by: PHYSICAL MEDICINE & REHABILITATION

## 2022-09-14 RX ORDER — METHYLPREDNISOLONE ACETATE 40 MG/ML
INJECTION, SUSPENSION INTRA-ARTICULAR; INTRALESIONAL; INTRAMUSCULAR; SOFT TISSUE
Status: DISCONTINUED | OUTPATIENT
Start: 2022-09-14 | End: 2022-09-14 | Stop reason: HOSPADM

## 2022-09-14 RX ORDER — LIDOCAINE HYDROCHLORIDE 10 MG/ML
INJECTION, SOLUTION EPIDURAL; INFILTRATION; INTRACAUDAL; PERINEURAL
Status: DISCONTINUED | OUTPATIENT
Start: 2022-09-14 | End: 2022-09-14 | Stop reason: HOSPADM

## 2022-09-14 RX ORDER — BUPIVACAINE HYDROCHLORIDE 2.5 MG/ML
INJECTION, SOLUTION EPIDURAL; INFILTRATION; INTRACAUDAL
Status: DISCONTINUED | OUTPATIENT
Start: 2022-09-14 | End: 2022-09-14 | Stop reason: HOSPADM

## 2022-09-14 NOTE — H&P
HPI  Patient presenting for Procedure(s) (LRB):  INJECTION,SACROILIAC JOINT bilateral (Bilateral)     Patient on Anti-coagulation No    No health changes since previous encounter. No changes in pain since procedure was scheduled at previous visit. Denies any fevers or infections. No chest pain.    No current facility-administered medications on file prior to encounter.     Current Outpatient Medications on File Prior to Encounter   Medication Sig Dispense Refill    azelastine (ASTELIN) 137 mcg (0.1 %) nasal spray INSTILL 1 SPRAY INTO EACH NOSTRIL TWICE A DAY FOR 30 DAYS      cholecalciferol, vitamin D3, 1,250 mcg (50,000 unit) capsule Take 50,000 Units by mouth every 7 days.      DULoxetine (CYMBALTA) 60 MG capsule Take 60 mg by mouth once daily.  0    fluticasone propionate (FLONASE) 50 mcg/actuation nasal spray 2 sprays by Each Nostril route once daily.      levothyroxine (SYNTHROID) 88 MCG tablet TAKE 1 TABLET BY MOUTH EVERY DAY 30 tablet 0    meloxicam (MOBIC) 15 MG tablet Take 15 mg by mouth once daily.      multivitamin (THERAGRAN) per tablet Take 1 tablet by mouth once daily.      progesterone (PROMETRIUM) 100 MG capsule TAKE 1 CAPSULE BY MOUTH EVERY DAY IN THE EVENING 90 capsule 3    tiZANidine (ZANAFLEX) 4 MG tablet Take 1 tablet (4 mg total) by mouth nightly as needed (pain, muscle spasm). 30 tablet 2    diclofenac (VOLTAREN) 75 MG EC tablet TAKE 1 TABLET BY MOUTH TWICE A DAY 60 tablet 0    flunisolide 25 mcg, 0.025%, (NASALIDE) 25 mcg (0.025 %) Spry 2 sprays once daily.  11    omeprazole (PRILOSEC) 40 MG capsule SMARTSI Capsule(s) By Mouth Morning-Night      ondansetron (ZOFRAN-ODT) 4 MG TbDL DISSOLVE 1 TABLET IN MOUTH EVERY 8 HOURS FOR 3 DAYS  0     Past Medical History:   Diagnosis Date    Abnormal Pap smear of cervix     Hpv (Dr Benitez)    Anemia     Anxiety     Depression     Fibromyalgia     History of kidney stones     HPV (human papilloma virus) infection     LEEP Done 12 (Dr Benitez)     "Hx of thyroid nodule 1999    Pelvic pain     PONV (postoperative nausea and vomiting)     Thyroid disease     Hypo    Tobacco use      Past Surgical History:   Procedure Laterality Date    AUGMENTATION OF BREAST      BREAST AUGMENTATION  1994    CERVICAL BIOPSY  W/ LOOP ELECTRODE EXCISION  05/24/2012    Dr Benitez    PELVIC LAPAROSCOPY  2000, 2017    THYROIDECTOMY, PARTIAL  1999    WISDOM TOOTH EXTRACTION  1990     Review of patient's allergies indicates:   Allergen Reactions    Minocin [minocycline] Swelling and Other (See Comments)     And Severe Joint Pain    Soma [carisoprodol] Hives    Bactrim [sulfamethoxazole-trimethoprim] Other (See Comments)     Black/dark color to tongue while taking medication (discoloration only - pt verbally stated that she has taken it before and could take it if needed)    Clarithromycin     Codeine     Sulfadiazine       No current facility-administered medications for this encounter.       PMHx, PSHx, Allergies, Medications reviewed in epic    ROS negative except pain complaints in HPI    OBJECTIVE:    /72   Pulse 79   Temp 97.2 °F (36.2 °C) (Skin)   Resp 18   Ht 5' 4" (1.626 m)   Wt 65.8 kg (145 lb)   LMP 08/24/2022   SpO2 99%   Breastfeeding No   BMI 24.89 kg/m²     PHYSICAL EXAMINATION:    GENERAL: Well appearing, in no acute distress, alert and oriented.  PSYCH:  Mood and affect appropriate.  SKIN: Skin color, texture, turgor normal in procedure area, no rashes or lesions which will impact the procedure.  CV: RRR with palpation of the radial artery.  PULM: No evidence of respiratory difficulty, symmetric chest rise. Clear to auscultation.  NEURO: Alert. Oriented. Speech fluent and appropriate. Moving all extremities.    Plan:    Proceed with procedure as planned Procedure(s) (LRB):  INJECTION,SACROILIAC JOINT bilateral (Bilateral)    Armani Reyes  09/14/2022            "

## 2022-09-14 NOTE — DISCHARGE SUMMARY
[facility]  Discharge Note  Short Stay     Admit Date: 9/14/2022    Discharge Date: 9/14/2022     Attending Physician: Rica Gonzalez M.D.    Diagnoses:  Active Hospital Problems    Diagnosis  POA    *Sacroiliac joint pain [M53.3]  Unknown      Resolved Hospital Problems   No resolved problems to display.     Discharged Condition: Good     Hospital Course: Patient was admitted for an outpatient interventional pain management procedure and tolerated the procedure well with no complications.     Final Diagnoses: Same as principal problem.     Disposition: Home or Self Care     Follow up/Patient Instructions:   Follow-up in 1-2 weeks unless otherwise instructed. May return sooner as needed.       Reconciled Medications:     Medication List        CONTINUE taking these medications      azelastine 137 mcg (0.1 %) nasal spray  Commonly known as: ASTELIN  INSTILL 1 SPRAY INTO EACH NOSTRIL TWICE A DAY FOR 30 DAYS     cholecalciferol (vitamin D3) 1,250 mcg (50,000 unit) capsule  Take 50,000 Units by mouth every 7 days.     diclofenac 75 MG EC tablet  Commonly known as: VOLTAREN  TAKE 1 TABLET BY MOUTH TWICE A DAY     DULoxetine 60 MG capsule  Commonly known as: CYMBALTA  Take 60 mg by mouth once daily.     fluticasone propionate 50 mcg/actuation nasal spray  Commonly known as: FLONASE  2 sprays by Each Nostril route once daily.     levothyroxine 88 MCG tablet  Commonly known as: SYNTHROID  TAKE 1 TABLET BY MOUTH EVERY DAY     meloxicam 15 MG tablet  Commonly known as: MOBIC  Take 15 mg by mouth once daily.     multivitamin per tablet  Commonly known as: THERAGRAN  Take 1 tablet by mouth once daily.     ondansetron 4 MG Tbdl  Commonly known as: ZOFRAN-ODT  DISSOLVE 1 TABLET IN MOUTH EVERY 8 HOURS FOR 3 DAYS     progesterone 100 MG capsule  Commonly known as: PROMETRIUM  TAKE 1 CAPSULE BY MOUTH EVERY DAY IN THE EVENING     tiZANidine 4 MG tablet  Commonly known as: ZANAFLEX  Take 1 tablet (4 mg total) by mouth nightly  as needed (pain, muscle spasm).            ASK your doctor about these medications      flunisolide 25 mcg (0.025%) 25 mcg (0.025 %) Spry  Commonly known as: NASALIDE  2 sprays once daily.     omeprazole 40 MG capsule  Commonly known as: PRILOSEC  SMARTSI Capsule(s) By Mouth Morning-Night             Discharge Procedure Orders (must include Diet, Follow-up, Activity)   Ice to affected area   Order Comments: 20 minutes of ice or until area numb to the touch if area is sore 2-3 times per day as needed     No driving until:   Order Comments: Until following day     No dressing needed     Notify your health care provider if you experience any of the following:  temperature >100.4     Notify your health care provider if you experience any of the following:  persistent nausea and vomiting or diarrhea     Notify your health care provider if you experience any of the following:  severe uncontrolled pain     Notify your health care provider if you experience any of the following:  redness, tenderness, or signs of infection (pain, swelling, redness, odor or green/yellow discharge around incision site)     Notify your health care provider if you experience any of the following:  difficulty breathing or increased cough     Notify your health care provider if you experience any of the following:  severe persistent headache     Notify your health care provider if you experience any of the following:  worsening rash     Notify your health care provider if you experience any of the following:  persistent dizziness, light-headedness, or visual disturbances     Notify your health care provider if you experience any of the following:  increased confusion or weakness     Shower on day dressing removed (No bath)       Rica Gonzalez M.D.  Interventional Pain Medicine / Physical Medicine & Rehabilitation

## 2022-09-14 NOTE — DISCHARGE INSTRUCTIONS
Home Care Instructions Pain Management:    1.  DIET:    You may resume your normal diet today.    2.  BATHING:    You may shower with luke warm water.    3.  DRESSING:    You may remove your bandage today.    4.  ACTIVITY LEVEL:      You may resume your normal activities 24 hours after your procedure.    5.  MEDICATIONS:    You may resume your normal medications today.    6.  SPECIAL INSTRUCTIONS:    No heat to the injection site for 24 hours including bath or shower, heating pad, moist heat or hot tubs.    Use an ice pack to the injection site for any pain or discomfort.  Apply ice packs for 20 minute intervals as needed.    If you have received any sedatives by mouth today, you can not drive for 12 hours.    If you have received sedation through an IV, you can not drive for 24 hours.    PLEASE CALL YOUR DOCTOR FOR THE FOLLOWIN.  Redness or swelling around the injection site.  2.  Fever of 101 degrees.  3.  Drainage (pus) from the injection site.  4.  For any continuous bleeding (some dried blood over the incision is normal.)    FOR EMERGENCIES:    If any unusual problems or difficulties occur during clinic hours, call [phone] or dial [phone].    Follow up with with your physician in 2-3 weeks.

## 2022-09-14 NOTE — OP NOTE
Sacroiliac Joint Injection Under Fluoroscopic Guidance:  I have reviewed the patient's medications, allergies and relevant histories prior to the procedure and no contraindications have been identified. The risks, benefits and alternatives to the procedure were discussed with the patient, and all questions regarding the procedure were answered to the patient's satisfaction. I personally obtained Yanique's consent prior to the start of the procedure and the signed consent can be found in the patient's chart.                                                         Time-out was taken to identify patient, procedure, laterality, and allergies prior to starting the procedure.       Date of Service: 09/14/2022  Procedure: Bilateral Sacroiliac Joint Injection  Pre-Operative Diagnosis: Sacroiliitis, Sacroiliac joint dysfunction/pain  Post-Operative Diagnosis: Sacroiliitis, Sacroiliac joint dysfunction/pain    Physician: Rica Gonzalez M.D.  Assistants: Ever Reyes M.D.      Medications Injected:  Depo-Medrol 40 mg/mL and 3 mL of Bupivacaine 0.25% (2 mL injected per side).  Local Anesthetic: Xylocaine 1% 10 mL.   Sedation Medications: None    Procedural Technique: Laying in the prone position, the patient was prepped and draped in the usual sterile fashion using ChloraPrep and fenestrated drape.  The area was determined under fluoroscopy.  Local Xylocaine was injected by raising a wheel and penetrating the subcutaneous tissue using a 25-gauge 1.5 inch hypodermic needle.  The 3.5 inch 25-gauge spinal needle was introduced into the sacroiliac joint of interest.  Negative pressure applied to confirm no intravascular placement.  Omnipaque was injected to confirm placement and to confirm that there was no vascular runoff.  The medication was then injected slowly.  Needle removed and bandage applied.     Estimated Blood Loss:  None.  Complications:  None.     Disposition: The patient tolerated the procedure well, and there  were no apparent complications. Vital signs remained stable throughout the procedure. The patient was taken to the recovery area and monitored. The patient was supplied with written discharge instructions for the procedure. If helpful, we can repeat as needed. The patient was discharged in a stable condition.    Follow-Up: We will see the patient back in 1-2 weeks or the patient may call to inform of status.

## 2022-09-26 ENCOUNTER — CLINICAL SUPPORT (OUTPATIENT)
Dept: REHABILITATION | Facility: HOSPITAL | Age: 47
End: 2022-09-26
Attending: PHYSICAL MEDICINE & REHABILITATION
Payer: MEDICARE

## 2022-09-26 DIAGNOSIS — G89.29 CHRONIC BILATERAL LOW BACK PAIN WITHOUT SCIATICA: Primary | ICD-10-CM

## 2022-09-26 DIAGNOSIS — M62.89 PELVIC FLOOR DYSFUNCTION: ICD-10-CM

## 2022-09-26 DIAGNOSIS — M47.816 LUMBAR SPONDYLOSIS: ICD-10-CM

## 2022-09-26 DIAGNOSIS — M53.3 COCCYDYNIA: ICD-10-CM

## 2022-09-26 DIAGNOSIS — M53.3 SACROILIAC JOINT PAIN: ICD-10-CM

## 2022-09-26 DIAGNOSIS — M54.50 CHRONIC BILATERAL LOW BACK PAIN WITHOUT SCIATICA: Primary | ICD-10-CM

## 2022-09-26 DIAGNOSIS — R32 URINARY INCONTINENCE, UNSPECIFIED TYPE: ICD-10-CM

## 2022-09-26 PROCEDURE — 97161 PT EVAL LOW COMPLEX 20 MIN: CPT | Mod: PO

## 2022-09-26 PROCEDURE — 97530 THERAPEUTIC ACTIVITIES: CPT | Mod: PO

## 2022-09-26 PROCEDURE — 97110 THERAPEUTIC EXERCISES: CPT | Mod: PO

## 2022-09-26 NOTE — PLAN OF CARE
"  [facility]   Physical Therapy Initial Evaluation     Date: 9/26/2022   Name: Yanique Mcdermott  Clinic Number: [id number]    Therapy Diagnosis:   Encounter Diagnoses   Name Primary?    Chronic bilateral low back pain without sciatica Yes    Sacroiliac joint pain     Lumbar spondylosis      Physician: Rica Gonzalez MD    Physician Orders: PT Eval and Treat  Medical Diagnosis from Referral:   M54.50 (ICD-10-CM) - Low back pain, unspecified   G89.29 (ICD-10-CM) - Other chronic pain   M53.3 (ICD-10-CM) - Sacrococcygeal disorders, not elsewhere classified   M47.816 (ICD-10-CM) - Spondylosis without myelopathy or radiculopathy, lumbar region     Evaluation Date: 9/26/2022  Authorization Period Expiration: 09/26/2023  Plan of Care Expiration: 12/26/2022  Progress Note Due: 10/26/2022  Visit # / Visits authorized: 1/ 1   FOTO: 1/ 3     Precautions: Standard    Time In: 130  Time Out: 215  Total Appointment Time (timed & untimed codes): 45 minutes      SUBJECTIVE   Date of onset:     History of current condition - Yanique reports: Pt is a 47 y.o female presenting to the clinic with c/o low back pain. Pt states that she's been having this pain for over 15 years and that she's feeling "miserable." Pt states that she has difficulty squat down and doing chores around the house. Pt has been doing water aerobics in the meantime to help her back. "My right hip and back feel out of place."    Falls: None    Imaging: See chart    Prior Therapy: Yes a long time ago for lumbar spine  Social History: Lives in a 2 story house with 4 steps to enter lives with their family  Occupation: Surgical tech  Prior Level of Function: Limited with squatting  Current Level of Function: Limited with households ADLs and recreation    Pain:  Current 3/10, worst 8/10, best 3/10   Location: bilateral back  Description: Burning  Aggravating Factors: Sitting, Bending, and Squatting  Easing Factors: hot bath and abdominal " brace    Patients goals: Return to gardening,      Medical History:   Past Medical History:   Diagnosis Date    Abnormal Pap smear of cervix 2010    Hpv (Dr Benitez)    Anemia     Anxiety     Depression     Fibromyalgia     History of kidney stones     HPV (human papilloma virus) infection 2010    LEEP Done 5-24-12 (Dr Benitez)    Hx of thyroid nodule 1999    Pelvic pain     PONV (postoperative nausea and vomiting)     Thyroid disease     Hypo    Tobacco use        Surgical History:   Yanique Mcdermott  has a past surgical history that includes Thyroidectomy, partial (1999); BREAST AUGMENTATION (1994); Pelvic laparoscopy (2000, 2017); [place] tooth extraction (1990); Cervical biopsy w/ loop electrode excision (05/24/2012); Augmentation of breast; and injection, sacroiliac joint (Bilateral, 9/14/2022).    Medications:   Yanique has a current medication list which includes the following prescription(s): azelastine, cholecalciferol (vitamin d3), diclofenac, duloxetine, flunisolide 25 mcg (0.025%), fluticasone propionate, levothyroxine, meloxicam, multivitamin, omeprazole, ondansetron, progesterone, and tizanidine.    Allergies:   Review of patient's allergies indicates:   Allergen Reactions    Minocin [minocycline] Swelling and Other (See Comments)     And Severe Joint Pain    Soma [carisoprodol] Hives    Bactrim [sulfamethoxazole-trimethoprim] Other (See Comments)     Black/dark color to tongue while taking medication (discoloration only - pt verbally stated that she has taken it before and could take it if needed)    Clarithromycin     Codeine     Sulfadiazine           OBJECTIVE     Observation: Pt amb to clinic ind    Posture:  FHP rounded shoulders    Lumbar Range of Motion:    % Limitation Pain Goal   Flexion 0   N      Full and pain free   Extension 0   N      Full and pain free   Left Side Bending 0 N      Full and pain free   Right Side Bending 0 N      Full and pain free   Left rotation   0 N      Full and pain  free   Right Rotation   0 N      Full and pain free      Hip Range of Motion:   Right  Left Goal   Hip Abduction 40 40 40 deg.   Hip Extension 30 30 30 deg.   Hip External Rotation (hip flexed to 90) 45 45 45 deg.   Hip Internal Rotation (hip flexed to 90) 45 45 45 deg.   Hip Flexion 135 135 120 deg.       Lower Extremity Strength  Right LE  Left LE  Goal   Knee extension: 5/5 Knee extension: 5/5 5/5   Knee flexion: 4/5 Knee flexion: 4/5 5/5   Hip flexion: 4/5 Hip flexion: 4/5 5/5   Hip extension:  4/5 Hip extension: 4/5 5/5   Hip abduction: 4/5 Hip abduction: 4/5 5/5   Hip adduction: 4+/5 Hip adduction 4+/5 5/5   Ankle dorsiflexion: 4+/5 Ankle dorsiflexion: 4+/5 5/5   Ankle plantarflexion: 4+/5 Ankle plantarflexion: 4+/5 5/5     Movement Quality  Bodyweight Squat Knee dominant     Special Tests:   Right Left   TRAMAINE Negative Negative   FADIR Negative Negative   SCOUR Negative Negative   Lumbar Instability Test Positive Positive     SI Joint Cluster  Sacral Thrust Positive   Sacral Distraction Negative   Gaenslen's Negative   Thigh Thrust Negative   Sacral Compression Negative     Form closure test: Positive  Force closure test: Negative    DTR:   Right Left Comment   Patellar (L3-4) 2+ 2+    Achilles (S1) 2+ 2+        Limitation/Restriction for FOTO Lumbar Survey    Therapist reviewed FOTO scores for Yanique Mcdermott on 9/26/2022.   FOTO documents entered into [employer] - see Media section.    Limitation Score: NT%         TREATMENT     Total Treatment time (time-based codes) separate from Evaluation: 15 minutes     THERAPEUTIC EXERCISES to develop  strength and core stabilization for 15 minutes including.    Intervention    Performed Today Sets/Reps/Resistance     Glute Bridges x 2x10   Dead bugs x 2x10   Bird dogs x 2x10     PATIENT EDUCATION AND HOME EXERCISES   Education provided:   Role of Physical Therapist  Physical Therapy Plan Of Care  HEP      Written Home Exercises Provided: yes.  Exercises were reviewed  and Yanique was able to demonstrate them prior to the end of the session.  Yanique demonstrated good  understanding of the education provided.     See EMR under Patient Instructions for exercises provided 9/26/2022    ASSESSMENT     Yanique is a 47 y.o. female referred to outpatient Physical Therapy with a medical diagnosis of   M54.50 (ICD-10-CM) - Low back pain, unspecified   G89.29 (ICD-10-CM) - Other chronic pain   M53.3 (ICD-10-CM) - Sacrococcygeal disorders, not elsewhere classified   M47.816 (ICD-10-CM) - Spondylosis without myelopathy or radiculopathy, lumbar region   . Patient presents with signs and symptoms consistent with a physical therapy diagnosis of core instability with form closure of the SI joint including the above listed objective test and measures. During today's session patient demonstrated decreased core stability and positive sign for form closure.    Patient prognosis is Good.   Patientt will benefit from skilled outpatient Physical Therapy to address the deficits stated above and in the chart below, provide patient /family education, and to maximize patientt's level of independence.     Plan of care discussed with patient: Yes  Patient's spiritual, cultural and educational needs considered and patient is agreeable to the plan of care and goals as stated below:     Anticipated Barriers for therapy: Chronicity of symptoms    Medical Necessity is demonstrated by the following  History  Co-morbidities and personal factors that may impact the plan of care Co-morbidities:   Medical History:   Past Medical History:   Diagnosis Date    Abnormal Pap smear of cervix 2010    Hpv (Dr Benitez)    Anemia     Anxiety     Depression     Fibromyalgia     History of kidney stones     HPV (human papilloma virus) infection 2010    LEEP Done 5-24-12 (Dr Benitez)    Hx of thyroid nodule 1999    Pelvic pain     PONV (postoperative nausea and vomiting)     Thyroid disease     Hypo    Tobacco use        Personal Factors:    no deficits     low   Examination  Body Structures and Functions, activity limitations and participation restrictions that may impact the plan of care Body Regions:   back  lower extremities    Body Systems:    gross symmetry  ROM  strength  gross coordinated movement    Participation Restrictions:   None    Activity limitations:   Learning and applying knowledge  no deficits    General Tasks and Commands  no deficits    Communication  no deficits    Mobility  no deficits    Self care  no deficits    Domestic Life  no deficits    Interactions/Relationships  no deficits    Life Areas  no deficits    Community and Social Life  no deficits         moderate   Clinical Presentation stable and uncomplicated low   Decision Making/ Complexity Score: low     Goals:    SHORT TERM GOALS:  4 weeks 9/26/2022   Recent signs and systems trend is improving in order to progress towards LTG's.    Patient will be independent with HEP in order to further progress and return to maximal function.    Pain rating at Worst: 5/10 in order to progress towards increased independence with activity.    Patient will be able to correct postural deviations in sitting and standing, to decrease pain and promote postural awareness for injury prevention.       LONG TERM GOALS: 10 weeks 9/26/2022   Patient will return to normal ADL, recreational, and work related activities with less pain and limitation.     Patient will improve AROM to stated goals in order to return to maximal functional potential.     Patient will improve Strength to stated goals of appropriate musculature in order to improve functional independence.     Pain Rating at Best: 1/10 to improve Quality of Life.     Patient will meet predicted functional outcome (FOTO) score: 40% to increase self-worth & perceived functional ability.    Patient will have met/partially met personal goal of: Returning to gardening with max pain 1/10 in order to demonstrate return to PLOF.        PLAN    Plan of care Certification: 9/26/2022 to 12/26/2022.    Outpatient Physical Therapy 1 times weekly for 10 weeks to include the following interventions: Cervical/Lumbar Traction, Manual Therapy, Neuromuscular Re-ed, Therapeutic Activities, and Therapeutic Exercise. PT to co-treat with pelvic floor PT during episode of care.    Trell Ravi, PT, DPT      I CERTIFY THE NEED FOR THESE SERVICES FURNISHED UNDER THIS PLAN OF TREATMENT AND WHILE UNDER MY CARE   Physician's comments:     Physician's Signature: ___________________________________________________

## 2022-09-27 ENCOUNTER — TELEPHONE (OUTPATIENT)
Dept: PAIN MEDICINE | Facility: CLINIC | Age: 47
End: 2022-09-27
Payer: MEDICARE

## 2022-09-27 ENCOUNTER — OFFICE VISIT (OUTPATIENT)
Dept: PAIN MEDICINE | Facility: CLINIC | Age: 47
End: 2022-09-27
Payer: MEDICARE

## 2022-09-27 VITALS
DIASTOLIC BLOOD PRESSURE: 82 MMHG | HEART RATE: 105 BPM | WEIGHT: 145.06 LBS | SYSTOLIC BLOOD PRESSURE: 125 MMHG | BODY MASS INDEX: 24.9 KG/M2

## 2022-09-27 DIAGNOSIS — M70.71 BURSITIS OF BOTH HIPS, UNSPECIFIED BURSA: ICD-10-CM

## 2022-09-27 DIAGNOSIS — M70.72 BURSITIS OF BOTH HIPS, UNSPECIFIED BURSA: ICD-10-CM

## 2022-09-27 DIAGNOSIS — R52 PAIN: ICD-10-CM

## 2022-09-27 DIAGNOSIS — M47.816 LUMBAR SPONDYLOSIS: Primary | ICD-10-CM

## 2022-09-27 DIAGNOSIS — M47.819 ARTHROPATHY OF FACET JOINTS AT MULTIPLE LEVELS: ICD-10-CM

## 2022-09-27 DIAGNOSIS — G89.4 CHRONIC PAIN SYNDROME: ICD-10-CM

## 2022-09-27 PROBLEM — R32 URINARY INCONTINENCE: Status: ACTIVE | Noted: 2022-09-27

## 2022-09-27 PROBLEM — M62.89 PELVIC FLOOR DYSFUNCTION: Status: ACTIVE | Noted: 2022-09-27

## 2022-09-27 PROCEDURE — 1159F MED LIST DOCD IN RCRD: CPT | Mod: CPTII,S$GLB,, | Performed by: NURSE PRACTITIONER

## 2022-09-27 PROCEDURE — 99999 PR PBB SHADOW E&M-EST. PATIENT-LVL III: ICD-10-PCS | Mod: PBBFAC,,, | Performed by: NURSE PRACTITIONER

## 2022-09-27 PROCEDURE — 99214 OFFICE O/P EST MOD 30 MIN: CPT | Mod: S$GLB,,, | Performed by: NURSE PRACTITIONER

## 2022-09-27 PROCEDURE — 1160F RVW MEDS BY RX/DR IN RCRD: CPT | Mod: CPTII,S$GLB,, | Performed by: NURSE PRACTITIONER

## 2022-09-27 PROCEDURE — 99999 PR PBB SHADOW E&M-EST. PATIENT-LVL III: CPT | Mod: PBBFAC,,, | Performed by: NURSE PRACTITIONER

## 2022-09-27 PROCEDURE — 3079F DIAST BP 80-89 MM HG: CPT | Mod: CPTII,S$GLB,, | Performed by: NURSE PRACTITIONER

## 2022-09-27 PROCEDURE — 99214 PR OFFICE/OUTPT VISIT, EST, LEVL IV, 30-39 MIN: ICD-10-PCS | Mod: S$GLB,,, | Performed by: NURSE PRACTITIONER

## 2022-09-27 PROCEDURE — 1160F PR REVIEW ALL MEDS BY PRESCRIBER/CLIN PHARMACIST DOCUMENTED: ICD-10-PCS | Mod: CPTII,S$GLB,, | Performed by: NURSE PRACTITIONER

## 2022-09-27 PROCEDURE — 3074F PR MOST RECENT SYSTOLIC BLOOD PRESSURE < 130 MM HG: ICD-10-PCS | Mod: CPTII,S$GLB,, | Performed by: NURSE PRACTITIONER

## 2022-09-27 PROCEDURE — 3008F BODY MASS INDEX DOCD: CPT | Mod: CPTII,S$GLB,, | Performed by: NURSE PRACTITIONER

## 2022-09-27 PROCEDURE — 3079F PR MOST RECENT DIASTOLIC BLOOD PRESSURE 80-89 MM HG: ICD-10-PCS | Mod: CPTII,S$GLB,, | Performed by: NURSE PRACTITIONER

## 2022-09-27 PROCEDURE — 3008F PR BODY MASS INDEX (BMI) DOCUMENTED: ICD-10-PCS | Mod: CPTII,S$GLB,, | Performed by: NURSE PRACTITIONER

## 2022-09-27 PROCEDURE — 1159F PR MEDICATION LIST DOCUMENTED IN MEDICAL RECORD: ICD-10-PCS | Mod: CPTII,S$GLB,, | Performed by: NURSE PRACTITIONER

## 2022-09-27 PROCEDURE — 3074F SYST BP LT 130 MM HG: CPT | Mod: CPTII,S$GLB,, | Performed by: NURSE PRACTITIONER

## 2022-09-27 RX ORDER — DICLOFENAC SODIUM 75 MG/1
75 TABLET, DELAYED RELEASE ORAL 2 TIMES DAILY PRN
Qty: 60 TABLET | Refills: 1 | Status: SHIPPED | OUTPATIENT
Start: 2022-09-27 | End: 2022-11-26

## 2022-09-27 NOTE — PROGRESS NOTES
[facility]  Established clinic visit    Chief Complaint:   Chief Complaint   Patient presents with    Low-back Pain         History of Present Illness: Yanique Mcdermott is a 47 y.o. female here for LBP.      Onset: Back pain started after her 18 year old was born.   Location: diffusely in the lumbar and sacral area and over her coccyx  Radiation: bilaterally, worse on the right to the mid-thigh above the knee  Timing: constant  Quality: Aching, Burning, Throbbing, and Deep  Exacerbating Factors: exercise, flexion, and lifting, leaning back, being one position too long  Alleviating Factors: heat, ice, laying down, massage, medications, physical therapy, and rest, and repositioning  Associated Symptoms: She denies night fever/night sweats, urinary incontinence, bowel incontinence, significant weight loss, significant motor weakness, and loss of sensations    She has tried chiropractor, physical therapy, bracing, acupuncture, medications, heat/cold. She found that pain pills help the most.     Severity: Currently: 8/10   Typical Range: 6-9/10     Exacerbation: 9/10     Interval History (09/27/2022):  Yanique Mcdermott returns today for follow up.  She is status post a bilateral SI joint injection reporting 50% relief of her pain.  She continues to complain of pain across her low back in a bandlike distribution pain is worse when performing ADLs patient reports while he wife has been diminished due to increased pain she also reporting pain in the lateral aspect of her hips bilaterally. Currently, the low back pain is the worse.    Current Pain Scales:  Current: 4/10              Typical Range: 4-5/10          Previous Interventions:  - 09/14/2022 bilateral SI joint injections 50% relief    Previous Therapies:  PT/OT: yes   Relevant Surgery: no   Previous Medications:   - NSAIDS: Diclofenac, ibuprofen, tylenol, meloxicam.  - Muscle Relaxants: skelaxin.  Soma caused hives. Tizanidine helped in the past.    - TCAs:   - SNRIs: Cymbalta  - Topicals: voltaren  - Anticonvulsants:    - Opioids: Allergy to codeine. Tramadol    Current Pain Medications:  Cymbalta 60 mg daily  Voltaren 75 mg   Tramadol     Blood Thinners: None    Full Medication List:    Current Outpatient Medications:     azelastine (ASTELIN) 137 mcg (0.1 %) nasal spray, INSTILL 1 SPRAY INTO EACH NOSTRIL TWICE A DAY FOR 30 DAYS, Disp: , Rfl:     cholecalciferol, vitamin D3, 1,250 mcg (50,000 unit) capsule, Take 50,000 Units by mouth every 7 days., Disp: , Rfl:     diclofenac (VOLTAREN) 75 MG EC tablet, TAKE 1 TABLET BY MOUTH TWICE A DAY, Disp: 60 tablet, Rfl: 0    DULoxetine (CYMBALTA) 60 MG capsule, Take 60 mg by mouth once daily., Disp: , Rfl: 0    flunisolide 25 mcg, 0.025%, (NASALIDE) 25 mcg (0.025 %) Spry, 2 sprays once daily., Disp: , Rfl: 11    fluticasone propionate (FLONASE) 50 mcg/actuation nasal spray, 2 sprays by Each Nostril route once daily., Disp: , Rfl:     levothyroxine (SYNTHROID) 88 MCG tablet, TAKE 1 TABLET BY MOUTH EVERY DAY, Disp: 30 tablet, Rfl: 0    meloxicam (MOBIC) 15 MG tablet, Take 15 mg by mouth once daily., Disp: , Rfl:     multivitamin (THERAGRAN) per tablet, Take 1 tablet by mouth once daily., Disp: , Rfl:     omeprazole (PRILOSEC) 40 MG capsule, SMARTSI Capsule(s) By Mouth Morning-Night, Disp: , Rfl:     ondansetron (ZOFRAN-ODT) 4 MG TbDL, DISSOLVE 1 TABLET IN MOUTH EVERY 8 HOURS FOR 3 DAYS, Disp: , Rfl: 0    progesterone (PROMETRIUM) 100 MG capsule, TAKE 1 CAPSULE BY MOUTH EVERY DAY IN THE EVENING, Disp: 90 capsule, Rfl: 3    tiZANidine (ZANAFLEX) 4 MG tablet, Take 1 tablet (4 mg total) by mouth nightly as needed (pain, muscle spasm)., Disp: 30 tablet, Rfl: 2     Review of Systems:  ROS    Allergies:  Minocin [minocycline], Soma [carisoprodol], Bactrim [sulfamethoxazole-trimethoprim], Clarithromycin, Codeine, and Sulfadiazine     Medical History:   has a past medical history of Abnormal Pap smear of cervix  (2010), Anemia, Anxiety, Depression, Fibromyalgia, History of kidney stones, HPV (human papilloma virus) infection (2010), thyroid nodule (1999), Pelvic pain, PONV (postoperative nausea and vomiting), Thyroid disease, and Tobacco use.    Surgical History:   has a past surgical history that includes Thyroidectomy, partial (1999); BREAST AUGMENTATION (1994); Pelvic laparoscopy (2000, 2017); [place] tooth extraction (1990); Cervical biopsy w/ loop electrode excision (05/24/2012); Augmentation of breast; and injection, sacroiliac joint (Bilateral, 9/14/2022).    Family History:  family history includes Alzheimer's disease in her maternal grandmother; Brain cancer in her mother; Breast cancer in her maternal aunt, maternal grandmother, and another family member; Cancer in her maternal grandfather, maternal grandmother, mother, and paternal grandfather; Colon cancer in her maternal grandfather; Diabetes in her father; Hypertension in her father; Pancreatic cancer in her maternal grandfather; Prostate cancer in her paternal grandfather.    Social History:   reports that she has been smoking vaping with nicotine. She has never used smokeless tobacco. She reports current alcohol use. She reports that she does not use drugs.    Physical Exam:  /82   Pulse 105   Wt 65.8 kg (145 lb 1 oz)   BMI 24.90 kg/m²   GEN: No acute distress. Calm, comfortable  HENT: Normocephalic, atraumatic, moist mucous membranes  EYE: Anicteric sclera, non-injected.   CV: Non-diaphoretic. Regular Rate. Radial Pulses 2+.  RESP: Breathing comfortably. Chest expansion symmetric.  EXT: No clubbing, cyanosis.   SKIN: Warm, & dry to palpation. No visible rashes or lesions of exposed skin.   PSYCH: Pleasant mood and appropriate affect. Recent and remote memory intact.   GAIT: Independent, normal ambulation  Lumbar Spine Exam:       Inspection: No erythema, bruising.       Palpation: (+) TTP of b/l SIJ > bilateral lumbar paraspinals. (+) sacrococcygeal  area.       ROM: Not significantly Limited in flexion, extension, lateral bending.       (+) Facet loading bilaterally      (-) Straight Leg Raise bilaterally      (-) TRAMAINE bilaterally  Hip Exam:      Inspection: No gross deformity or apparent leg length discrepancy      Palpation: (+) TTP to bilateral greater trochanteric bursas.       ROM:  No limitation or pain in internal rotation, external rotation b/l  Neurologic Exam:     Alert. Speech is fluent and appropriate.     Strength:  5/5 throughout bilateral lower extremities     Sensation:  Grossly intact to light touch in bilateral lower extremities     Reflexes: 2+ in b/l patella, achilles     Tone: No abnormality appreciated in bilateral lower extremities     No Clonus     Downgoing toes on plantar stimulation      Imaging:  - MRI Lumbar spine 8/17/22:  Anatomic alignment of the osseous structures of the lumbar spine is present. No evidence of spondylolysis or spondylolisthesis.  Straightening of the normal lumbar lordosis. This is likely secondary to some degree of muscle spasm or strain and may be related to trauma.  The bone marrow signal intensity of the osseous elements of the lumbar spine is normal. There is no evidence of fracture, bone marrow edema or bone marrow replacement process within the lumbar spine, visualized elements of the lower thoracic spine or the sacrum.  The anterior longitudinal spinal ligament, posterior longitudinal spinal ligament, ligamentum flavum, interspinous ligaments and supraspinal ligaments are intact at all locations.  The conus medullaris is normal in position and appearance. Its tip lies posterior to the cephalic aspect of the L1 vertebral body.  The prevertebral soft tissues are normal. The paraspinal musculature is normal. The visualized retroperitoneal soft tissues are normal.  T11-T12: Normal signal intensity within the nucleus pulposus and normal disc contour. No evidence of neural foraminal or central vertebral canal  stenosis.      T12-L1: Normal signal intensity within the nucleus pulposus and normal disc contour. No evidence of neural foraminal or central vertebral canal stenosis.  L1-L2: Normal signal intensity within the nucleus pulposus and normal disc contour. No evidence of neural foraminal or central vertebral canal stenosis.   L2-L3: Normal signal intensity within the nucleus pulposus and normal disc contour. No evidence of neural foraminal or central vertebral canal stenosis.  L3-L4: Diminished signal intensity in the nucleus pulposus with difuse bulge of the annulus fibrosis causing moderate bilateral neural foraminal stenosis which is equivalent side to side. No central vertebral canal stenosis.  L4-L5: Diminished signal intensity in the nucleus pulposus with difuse bulge of the annulus fibrosis causing moderate bilateral neural foraminal stenosis which is equivalent side to side. No central vertebral canal stenosis.  L5-S1: Diminished signal intensity in the nucleus pulposus with difuse bulge of the annulus fibrosis causing moderate bilateral neural foraminal stenosis which is equivalent side to side. No central vertebral canal stenosis.  The facet joints at all  levels of the lumbar spine exhibit mild-to-moderate changes of osteoarthritis consisting of thinning of the articular cartilage with periarticular sclerosis,  marginal osteophyte formation and ligamentum flavum hypertrophy. Bilateral facet arthropathy, most apparent at L3-S1.  IMPRESSION   1.  Straightening of the normal lumbar lordosis. This is likely secondary to some degree of muscle spasm or strain and may be related to trauma.   2. Neural foraminal stenosis as a consequence of diffuse buldge of the annulous fibrosis, bilaterally, L3-L4, L4-L5 and L5-S1. Findings are new and developed in the interval since the prior exam.   3.  Bilateral facet arthropathy, most apparent at L3-S1. Interval worsening since the prior exam.      - Xray Cervical Spine  08/29/2022:  FINDINGS:  Vertebral bodies are intact.  Mild narrowing of the C6-C7 disc space can be seen.  No instability in flexion and extension is noted.  No significant bony spurring is seen.     -Xray Lumbar Spine 05/14/2021:  FINDINGS:  Lumbar vertebral body heights are maintained.  Disc spaces are maintained.  Degenerative facet hypertrophic changes L4-5 and L5-S1.  AP alignment is anatomic.      Labs:  BMP  No results found for: NA, K, CL, CO2, BUN, CREATININE, CALCIUM, ANIONGAP, ESTGFRAFRICA, EGFRNONAA  No results found for: ALT, AST, GGT, ALKPHOS, BILITOT  Lab Results   Component Value Date     02/07/2018       Assessment:  Yanique Mcdermott is a 47 y.o. 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 lady presenting with:     - Chronic bilateral low back pain: Pain appears primarily due to SIJ dysfunction. Though facetogenic aspects as well on exam.  - Coccydynia  - Comorbidities: Anxiety and Depression. Tobacco use.     09/27/[id number]-erbu-old female with a history of chronic low back, bilateral hip and neck pain.  Her worst pain today is her low back upon review of her recent x-rays she does have lumbar facet arthropathy noted at L4-5 and L5-S1.  Based on Dr. Gonzalez's previous plan we will now consider her for diagnostic bilateral lumbar MBBs targeting L4-5 L5-S1 considering her worst pain today is her low back.    Treatment Plan:   - PT/OT/HEP:  Continue with PT. Discussed benefits of exercise for pain.   - Procedures: Schedule diagnostic bilateral lumbar MBB targeting L4-5 and L5-S1   - If tail bone pain persists, plan for sacrococcygeal joint and ganglion impar block               -consider bilateral GTBs in the future  - Medications:  Continue Tizanidine 4 mg qHS prn                             New Rx of diclofenac 70 mg b.i.d. p.r.n. recommended she discontinue meloxicam  - Imaging: Reviewed.  - Labs: Reviewed.      Follow Up:  Patient to leave a [employer] message following results of her 1st diagnostic MBB    David White, NP-C  [facility]    Disclaimer: This note was partly generated using dictation software which may occasionally result in transcription errors.

## 2022-09-27 NOTE — PLAN OF CARE
"[facility]  Pelvic Health Physical Therapy Initial Evaluation    Date: 9/26/2022   Name: Yanique Mcdermott  Clinic Number: [id number]  Therapy Diagnosis:   Encounter Diagnoses   Name Primary?    Coccydynia     Pelvic floor dysfunction     Urinary incontinence, unspecified type      Physician: Rica Gonzalez MD    Physician Orders: Pelvic PT Eval and Treat  Medical Diagnosis from Referral:   Evaluation Date: 9/26/2022  Authorization Period Expiration: TBD  Plan of Care Expiration: 9/26/23  Visit # / Visits authorized: 1/ 1    Time In: 2:30  Time Out: 3:30  Total Appointment Time (timed & untimed codes): 60 minutes    Precautions: universal    Subjective     Date of onset: 6 months    History of current condition - Yanique reports: She has to wear bladder pads or period underwear most of the time, because she leaks when she has a full bladder with coughing, laughing, sneezing. Was having urinary urgency - cut out artificial sweeteners and took aloe vera supplements which helped remedy this. "It feels like something is in the wrong place" - feels like "something is trapped" when she tries to have bowel movements. Experiencing incomplete emptying of bowels. During her first vaginal delivery, she believes her tailbone dislocated into extension, now seems to be healed in flexed position? Has severe low abdominal/pelvic pain sometimes. She reports hx of cysts, but had exploratory surgery and found nothing. Typically has very painful periods. Has had LEEP procedure. She is seeing PT for low back/SIJ pain concurrently. Two SIJ injections done 09/14/22 - still in pain but it did help somewhat.     OB/GYN History:  taking progesterone daily. First vaginal delivery was complicated. Second delivery was very easy. 3rd degree tearing??  Sexually active? Yes, but not as often since roddy-menopause  Pain with vaginal exams, intercourse or tampon use? No, but has mild vaginal dryness. Uses both tampons and " pads    Bladder/Bowel History:   Frequency of urination:   Daytime: 4-6 times per day           Nighttime: sometimes, not every night  Difficulty initiating urine stream: No  Urine stream: strong  Complete emptying: no - sometimes will have second stream post void  Bladder leakage: Yes  Frequency of incidents: every time she drinks alcohol. Worse with menstrual cycles? No pattern to it otherwise. Happening once every 1-2 weeks  Amount leaked (urine): small squirt   Urinary Urgency: Yes  Able to delay the urge for a few minute(s). Has had occasional urge incontinence   Frequency of bowel movements: once a day  Difficulty initiating BM: No  Quality/Shape of BM: [place] Stool Chart 3-4  Does Patient Feel Empty after BM? Sometimes feels like stool gets stuck at the bottom of the rectum and she is unable to complete the bowel movement.  Fiber Supplements or Laxative Use?  No, just regular multivitamins  Fecal Urgency: normal, felt in rectum and abdomen  Colon leakage: No  Form of protection: pads or period underwear  Number of pads required in 24 hours: on a normal day, no more than one. But has leakage with jumping, and with laughing with full bladder    Prior Therapy/Previous treatment included: none for incontinence. 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 Lots of fruits and veggies  Habitus:well developed, well nourished  Abuse/Neglect: No     PAIN:  Location: abdominoepelvic pain  Description: Sharp  Aggravating Factors/Activities that cause symptoms: unsure   Easing Factors: nothing     Medical History: Yanique  has a past medical history of Abnormal Pap smear of cervix (2010), Anemia,  Anxiety, Depression, Fibromyalgia, History of kidney stones, HPV (human papilloma virus) infection (2010), thyroid nodule (1999), Pelvic pain, PONV (postoperative nausea and vomiting), Thyroid disease, and Tobacco use.     Surgical History:  Yanique Mcdermott  has a past surgical history that includes Thyroidectomy, partial (1999); BREAST AUGMENTATION (1994); Pelvic laparoscopy (2000, 2017); [place] tooth extraction (1990); Cervical biopsy w/ loop electrode excision (05/24/2012); Augmentation of breast; and injection, sacroiliac joint (Bilateral, 9/14/2022).    Medications: Yanique has a current medication list which includes the following prescription(s): azelastine, cholecalciferol (vitamin d3), diclofenac, duloxetine, flunisolide 25 mcg (0.025%), fluticasone propionate, levothyroxine, meloxicam, multivitamin, omeprazole, ondansetron, progesterone, and tizanidine.    Allergies:   Review of patient's allergies indicates:   Allergen Reactions    Minocin [minocycline] Swelling and Other (See Comments)     And Severe Joint Pain    Soma [carisoprodol] Hives    Bactrim [sulfamethoxazole-trimethoprim] Other (See Comments)     Black/dark color to tongue while taking medication (discoloration only - pt verbally stated that she has taken it before and could take it if needed)    Clarithromycin     Codeine     Sulfadiazine         Imaging See EMR for full imaging workup    Pts goals: to be able to control her bladder and     OBJECTIVE     See EMR under MEDIA for written consent provided 9/26/2022  Chaperone: declined    ORTHO SCREEN  Posture in sitting: slouched  Posture in standing: forward head and forward and rounded shoulders   Pelvic alignment: Not assessed today      VAGINAL PELVIC FLOOR EXAM    Vaginal exam deferred - explained procedure and will perform at next visit.      Limitation/Restriction for FOTO urinary problem Survey    Therapist reviewed FOTO scores for Yanique Mcdermott on 9/26/2022.   FOTO documents  entered into [employer] - see Media section.    Limitation Score: 50%       TREATMENT     Treatment Time In: 3:20  Treatment Time Out: 3:30  Total Treatment time (time-based codes) separate from Evaluation: 10 minutes  Therapeutic Activity Patient participated in dynamic functional therapeutic activities to improve functional performance for 10 minutes. Including: Education as described below.     Patient Education provided:   general anatomy/physiology of urinary/ bowel  system, benefits of treatment, risks of treatment, and alternative methods of treatment were discussed with the pt. Additionally, bladder irritants and anatomy/physiology of pelvic floor were reviewed.     Home Exercises Provided:  Written Home Exercises Provided: yes.  Exercises were reviewed and Yanique was able to demonstrate them prior to the end of the session.    Yanique demonstrated good  understanding of the education provided.     See EMR under Patient Instructions for exercises provided 9/26/2022.    Assessment     Yanique is a 47 y.o. female referred to outpatient Physical Therapy with a medical diagnosis of Coccydynia [M53.3]. Pt presents with poor coordination of pelvic floor muscles during ADL's leading to urinary or fecal leakage, dysfunctional voiding, and dysfunctional defecation. Pt's SIJ/low back/coccygeal pain is being addressed in her other PT sessions with the orthopedic specialist. Therefore it is best to focus pelvic PT sessions on bowel and bladder functions. Pt is agreeable to this. Vaginal pelvic floor exam was deferred to next visit.    Pt prognosis is Good.   Pt will benefit from skilled outpatient Physical Therapy to address the deficits stated above and in the chart below, provide pt/family education, and to maximize pt's level of independence.     Plan of care discussed with patient: Yes  Pt's spiritual, cultural and educational needs considered and patient is agreeable to the plan of care and goals as stated below:        Anticipated Barriers for therapy: scheduling    Medical Necessity is demonstrated by the following    History  Co-morbidities and personal factors that may impact the plan of care Co-morbidities:   Medical History: Yanique  has a past medical history of Abnormal Pap smear of cervix (2010), Anemia, Anxiety, Depression, Fibromyalgia, History of kidney stones, HPV (human papilloma virus) infection (2010), thyroid nodule (1999), Pelvic pain, PONV (postoperative nausea and vomiting), Thyroid disease, and Tobacco use.     Personal Factors:   no deficits     low   Examination  Body Structures and Functions, activity limitations and participation restrictions that may impact the plan of care Body Regions/Systems/Functions:  poor coordination of pelvic floor muscles during ADL's leading to urinary or fecal leakage, dysfunctional voiding, and dysfunctional defecation     Activity Limitations:  bearing down for BM, full bladder emptying, incontinence with ADLs, and Pain with ADLs    Participation Restrictions:  social activities with friends/family, relationship with spouse/partner, ADL participation affected by pain, regularly having a comfortable BM, Pelvic pressure with ADLs. , exercise restrictions due to incontinence , and difficulty starting urine stream or fully emptying bladder     Activity limitations:   Learning and applying knowledge  no deficits    General Tasks and Commands  no deficits    Communication  no deficits    Mobility  no deficits    Self care  no deficits    Domestic Life  no deficits    Interactions/Relationships  no deficits    Life Areas  no deficits    Community and Social Life  no deficits       high   Clinical Presentation evolving clinical presentation with changing clinical characteristics mod   Decision Making/ Complexity Score: low     Short Term Goals: 6 weeks  Pt will verbalize improved awareness of PFM activity as palpated by PT in order to improve activity involvement with HEP.  Pt to  be edu pelvic muscle bracing and be able to consistently perform correctly and quickly to help decrease incontinence with cough/laugh/sneeze.  Pt to demonstrate being able to correctly and consistently perform a kegel which is needed  to increase pelvic floor muscle coordination and strength needed for continence.  Pt to voice understanding of the role that diet plays on urinary urgency.    Pt to demonstrate an improved score in the FOTO urinary problem survey  to at least 60 to demonstrate improving urinary function.       Long Term Goals: 16 weeks  Pt to be discharged with home plan for carry over after discharge.    Pt to be able to perform a 10 second kegel x 10 reps to demonstrate improving strength and endurance needed for continence.  Pt to report a decrease in pad usage to 0 pads a day to demonstrate improving pelvic floor muscle controls as evidenced by decreased episodes of incontinence needed to improve confidence in social situations.  Pt to be able to delay the urge to urinate at least 5 minutes with a strong urge to urinate in order to make it to the bathroom without leaking.  Pt to report no longer feeling the need to urinate just in case when shopping or participating in social activities to demonstrate improving pelvic floor and bladder control.  Pt to report elimination of incontinence with ADLs to demonstrate improved pelvic floor muscle strength and coordination  Pt to demonstrate an improved score in the FOTO urinary problem survey  to at least 70 to demonstrate improving urinary function.    Pt to increase pelvic floor strength by at least one grade to demonstrate improved strength needed for continence with ADLs.        Plan     Plan of care Certification: 9/26/2022 to 9/26/2023.    Outpatient Physical Therapy 1 times weekly for 16 weeks to include the following interventions: therapeutic exercises, therapeutic activity, neuromuscular re-education, manual therapy, modalities PRN,  patient/family education, dry needling, and self care/home management    I appreciate your consult and look forward to participating in this patient's care.    Regina Trujillo, PT, DPT

## 2022-09-29 ENCOUNTER — TELEPHONE (OUTPATIENT)
Dept: PAIN MEDICINE | Facility: CLINIC | Age: 47
End: 2022-09-29
Payer: MEDICARE

## 2022-09-29 NOTE — TELEPHONE ENCOUNTER
----- Message from Laurie Brown LPN sent at 9/28/2022  4:36 PM CDT -----    ----- Message -----  From: Saundra Deng  Sent: 9/28/2022  12:25 PM CDT  To: Lisa Strauss Staff    Type:  cancel procedure     Who Called:Pt   Would the patient rather a call back or a response via [facility]? Call back  Best Call Back Number: [phone]  Additional Information:     Calling to cancel procedure

## 2022-09-29 NOTE — TELEPHONE ENCOUNTER
Spoke with patient. Requesting procedure to be canceled at this time. Declines rescheduling at this time.

## 2023-01-23 ENCOUNTER — OFFICE VISIT (OUTPATIENT)
Dept: OBSTETRICS AND GYNECOLOGY | Facility: CLINIC | Age: 48
End: 2023-01-23
Attending: OBSTETRICS & GYNECOLOGY
Payer: MEDICARE

## 2023-01-23 VITALS — WEIGHT: 147.69 LBS | HEIGHT: 64 IN | BODY MASS INDEX: 25.21 KG/M2

## 2023-01-23 DIAGNOSIS — G47.00 INSOMNIA, UNSPECIFIED TYPE: ICD-10-CM

## 2023-01-23 DIAGNOSIS — Z01.419 ENCOUNTER FOR GYNECOLOGICAL EXAMINATION: Primary | ICD-10-CM

## 2023-01-23 DIAGNOSIS — R61 NIGHT SWEATS: ICD-10-CM

## 2023-01-23 DIAGNOSIS — N64.4 BREAST PAIN: ICD-10-CM

## 2023-01-23 PROCEDURE — 99999 PR PBB SHADOW E&M-EST. PATIENT-LVL III: ICD-10-PCS | Mod: PBBFAC,,, | Performed by: OBSTETRICS & GYNECOLOGY

## 2023-01-23 PROCEDURE — 3008F PR BODY MASS INDEX (BMI) DOCUMENTED: ICD-10-PCS | Mod: CPTII,S$GLB,, | Performed by: OBSTETRICS & GYNECOLOGY

## 2023-01-23 PROCEDURE — 1159F PR MEDICATION LIST DOCUMENTED IN MEDICAL RECORD: ICD-10-PCS | Mod: CPTII,S$GLB,, | Performed by: OBSTETRICS & GYNECOLOGY

## 2023-01-23 PROCEDURE — G0101 PR CA SCREEN;PELVIC/BREAST EXAM: ICD-10-PCS | Mod: S$GLB,,, | Performed by: OBSTETRICS & GYNECOLOGY

## 2023-01-23 PROCEDURE — G0101 CA SCREEN;PELVIC/BREAST EXAM: HCPCS | Mod: S$GLB,,, | Performed by: OBSTETRICS & GYNECOLOGY

## 2023-01-23 PROCEDURE — 3008F BODY MASS INDEX DOCD: CPT | Mod: CPTII,S$GLB,, | Performed by: OBSTETRICS & GYNECOLOGY

## 2023-01-23 PROCEDURE — 1159F MED LIST DOCD IN RCRD: CPT | Mod: CPTII,S$GLB,, | Performed by: OBSTETRICS & GYNECOLOGY

## 2023-01-23 PROCEDURE — 99999 PR PBB SHADOW E&M-EST. PATIENT-LVL III: CPT | Mod: PBBFAC,,, | Performed by: OBSTETRICS & GYNECOLOGY

## 2023-01-23 RX ORDER — RIMEGEPANT SULFATE 75 MG/75MG
75 TABLET, ORALLY DISINTEGRATING ORAL DAILY PRN
COMMUNITY
Start: 2022-11-11

## 2023-01-23 RX ORDER — PROGESTERONE 100 MG/1
100 CAPSULE ORAL DAILY
Qty: 90 CAPSULE | Refills: 3 | Status: SHIPPED | OUTPATIENT
Start: 2023-01-23

## 2023-01-23 RX ORDER — SODIUM PICOSULFATE, MAGNESIUM OXIDE, AND ANHYDROUS CITRIC ACID 10; 3.5; 12 MG/160ML; G/160ML; G/160ML
LIQUID ORAL
COMMUNITY
Start: 2022-08-22 | End: 2023-12-04

## 2023-01-23 RX ORDER — DULOXETIN HYDROCHLORIDE 30 MG/1
30 CAPSULE, DELAYED RELEASE ORAL
COMMUNITY
Start: 2022-12-31 | End: 2023-12-04

## 2023-01-23 NOTE — PROGRESS NOTES
LMP: Patient's last menstrual period was 2023 (approximate)..    Contraception: The current method of family planning is none  Meds per MD: Progesterone    Last Pap: 3- pap & hpv negative  Last MM-10-22 birads 1 [facility]  Last Colonoscopy: never

## 2023-01-24 NOTE — PROGRESS NOTES
Chief Complaint Well woman exam:  Annual Exam    She is established    Yanique Mcdermott is a 47 y.o. female  presents for a well woman exam.    Stopped smoking and vaping!  On progesterone 100mg daily Has occasional flushes and mild insomnia  Cycles are monthly   But biggest issue is acne- talked today about estrogen excess  Will restart spironolactone and rec adding DIM      Review of Systems - :   No abdominal pain, No vaginal bleeding or discharge,   No breast pain or masses, No rectal bleeding     LMP: Patient's last menstrual period was 2023 (approximate)..    Contraception: The current method of family planning is none  Meds per MD: Progesterone     Last Pap: 3- pap & hpv negative  Last MM-10-22 birads 1 [facility]  Last Colonoscopy: never    Past Medical History:   Diagnosis Date    Abnormal Pap smear of cervix     Hpv (Dr Benitez)    Anemia     Anxiety     Depression     Fibromyalgia     History of kidney stones     HPV (human papilloma virus) infection     LEEP Done 12 (Dr Benitez)    Hx of thyroid nodule     Pelvic pain     PONV (postoperative nausea and vomiting)     Thyroid disease     Hypo    Tobacco use        Past Surgical History:   Procedure Laterality Date    AUGMENTATION OF BREAST      BREAST AUGMENTATION      CERVICAL BIOPSY  W/ LOOP ELECTRODE EXCISION  2012    Dr Benitez    INJECTION, SACROILIAC JOINT Bilateral 2022    Procedure: INJECTION,SACROILIAC JOINT bilateral;  Surgeon: Rica Gonzalez MD;  [facility];  Service: Pain Management;  Laterality: Bilateral;    PELVIC LAPAROSCOPY  , 2017    THYROIDECTOMY, PARTIAL  1999    WISDOM TOOTH EXTRACTION         OB History    Para Term  AB Living   2 2 2     2   SAB IAB Ectopic Multiple Live Births           2      # Outcome Date GA Lbr Burt/2nd Weight Sex Delivery Anes PTL Lv   2 Term 07 38w0d  3.742 kg (8 lb 4 oz) M Vag-Spont EPI  JENNIFER   1 Term 04 38w0d  4.508 kg  "(9 lb 15 oz) M Vag-Spont EPI  JENNIFER      Obstetric Comments   Menarche 12       Family History   Problem Relation Age of Onset    Prostate cancer Paternal Grandfather     Cancer Paternal Grandfather     Breast cancer Maternal Grandmother     Cancer Maternal Grandmother     Alzheimer's disease Maternal Grandmother     Colon cancer Maternal Grandfather     Cancer Maternal Grandfather     Pancreatic cancer Maternal Grandfather     Diabetes Father     Hypertension Father     Cancer Mother     Brain cancer Mother     Breast cancer Maternal Aunt     Breast cancer Other         pat gr aunt    Ovarian cancer Neg Hx        Social History     Tobacco Use    Smoking status: Former     Types: Vaping with nicotine    Smokeless tobacco: Never   Substance Use Topics    Alcohol use: Yes     Comment: Social    Drug use: No           Physical Exam:  Ht 5' 4" (1.626 m)   Wt 67 kg (147 lb 11.3 oz)   LMP 01/17/2023 (Approximate)   BMI 25.35 kg/m²     APPEARANCE: Well nourished, well developed, in no acute distress.  AFFECT: WNL, alert and oriented x 3  SKIN: No acne or hirsutism  BREASTS: Symmetrical, no skin changes.                     No nipple discharge.   No palpable masses bilaterally  NODES: No inguinal nor axillary LAD  ABDOMEN: soft Non tender Non distended No masses  PELVIC:   Normal external genitalia without lesions.   Normal urethral meatus.    No signif cystocele or rectocele.  Vagina atrophic without lesions or discharge.    Cervix atrophic, without lesions, discharge or tenderness.    Bimanual exam shows uterus to be normal size, regular, mobile and nontender.    Adnexa without masses or tenderness.    EXTREMITIES: No edema.    ASSESSMENT AND PLAN    Yanique was seen today for annual exam.    Diagnoses and all orders for this visit:    Encounter for gynecological examination    Breast pain- improved with Progesterone  -     progesterone (PROMETRIUM) 100 MG capsule; Take 1 capsule (100 mg total) by mouth once " daily.    Night sweats- improved  -     progesterone (PROMETRIUM) 100 MG capsule; Take 1 capsule (100 mg total) by mouth once daily.    Acne- add DIM and restart Spironolactone          Patient was counseled today on A.C.S. Pap guidelines and recommendations for yearly pelvic exams, mammograms and monthly self breast exams; to see her PCP for other health maintenance.     Patient encouraged to register for portal and results will be sent via portal.

## 2023-03-13 ENCOUNTER — TELEPHONE (OUTPATIENT)
Dept: OBSTETRICS AND GYNECOLOGY | Facility: CLINIC | Age: 48
End: 2023-03-13

## 2023-03-13 NOTE — TELEPHONE ENCOUNTER
Pt states she has been having brown discharge for the past week and today had red blood mixed in.  In perimenopause.  On DIM supplements and Progesterone.  Hasn't missed any doses.  No odor.  LMP  2/18.  Advised irregular bleeding is normal in perimenopause, may be starting actual period today.  If still bleeding by next week call back but can monitor for now.  Has not taken a UPT, doesn't think she is pregnant, no libido.

## 2023-08-31 ENCOUNTER — OFFICE VISIT (OUTPATIENT)
Dept: URGENT CARE | Facility: CLINIC | Age: 48
End: 2023-08-31
Payer: MEDICARE

## 2023-08-31 VITALS
DIASTOLIC BLOOD PRESSURE: 74 MMHG | HEART RATE: 127 BPM | WEIGHT: 147 LBS | BODY MASS INDEX: 25.1 KG/M2 | OXYGEN SATURATION: 95 % | SYSTOLIC BLOOD PRESSURE: 116 MMHG | TEMPERATURE: 99 F | RESPIRATION RATE: 18 BRPM | HEIGHT: 64 IN

## 2023-08-31 DIAGNOSIS — J06.9 URI, ACUTE: ICD-10-CM

## 2023-08-31 DIAGNOSIS — R05.9 COUGH, UNSPECIFIED TYPE: Primary | ICD-10-CM

## 2023-08-31 LAB
CTP QC/QA: YES
SARS-COV-2 AG RESP QL IA.RAPID: NEGATIVE

## 2023-08-31 PROCEDURE — 87811 SARS CORONAVIRUS 2 ANTIGEN POCT, MANUAL READ: ICD-10-PCS | Mod: QW,S$GLB,, | Performed by: FAMILY MEDICINE

## 2023-08-31 PROCEDURE — 87811 SARS-COV-2 COVID19 W/OPTIC: CPT | Mod: QW,S$GLB,, | Performed by: FAMILY MEDICINE

## 2023-08-31 PROCEDURE — 99203 PR OFFICE/OUTPT VISIT, NEW, LEVL III, 30-44 MIN: ICD-10-PCS | Mod: S$GLB,,, | Performed by: FAMILY MEDICINE

## 2023-08-31 PROCEDURE — 99203 OFFICE O/P NEW LOW 30 MIN: CPT | Mod: S$GLB,,, | Performed by: FAMILY MEDICINE

## 2023-08-31 RX ORDER — LORATADINE 10 MG/1
10 TABLET ORAL DAILY
Qty: 30 TABLET | Refills: 2 | Status: SHIPPED | OUTPATIENT
Start: 2023-08-31 | End: 2024-08-30

## 2023-08-31 RX ORDER — BENZONATATE 100 MG/1
200 CAPSULE ORAL 3 TIMES DAILY PRN
Qty: 30 CAPSULE | Refills: 1 | Status: SHIPPED | OUTPATIENT
Start: 2023-08-31

## 2023-08-31 RX ORDER — FLUTICASONE PROPIONATE 50 MCG
1 SPRAY, SUSPENSION (ML) NASAL DAILY
Qty: 18 G | Refills: 3 | Status: SHIPPED | OUTPATIENT
Start: 2023-08-31 | End: 2023-09-30

## 2023-08-31 NOTE — PROGRESS NOTES
"Subjective:      Patient ID: Yanique Mcdermott is a 48 y.o. female.    Vitals:  height is 5' 4" (1.626 m) and weight is 66.7 kg (147 lb). Her oral temperature is 98.6 °F (37 °C). Her blood pressure is 116/74 and her pulse is 127 (abnormal). Her respiration is 18 and oxygen saturation is 95%.     Chief Complaint: Sore Throat (Runny nose  body aches)    48 yr female present with Runny nose body aches sore throat swollen glands cough. Onset yesterday. Treatments at home include ibuprofen sudafed  Son with similar sx  Vaccinated      Sore Throat   This is a new problem. The current episode started yesterday. The problem has been gradually worsening. There has been no fever. The pain is at a severity of 6/10. Associated symptoms include congestion, coughing, ear pain, headaches, a hoarse voice, a plugged ear sensation, neck pain, swollen glands and trouble swallowing. She has had no exposure to strep or mono. Treatments tried: sudafed Ibuprofen. The treatment provided no relief.       HENT:  Positive for ear pain, congestion, sore throat and trouble swallowing.    Neck: Positive for neck pain.   Respiratory:  Positive for cough.    Neurological:  Positive for headaches.      Objective:     Physical Exam   Constitutional: She is oriented to person, place, and time. She appears well-developed. She is cooperative.   HENT:   Head: Normocephalic and atraumatic.   Ears:   Right Ear: Hearing, tympanic membrane, external ear and ear canal normal.   Left Ear: Hearing, tympanic membrane, external ear and ear canal normal.   Nose: Nose normal. No mucosal edema or nasal deformity. No epistaxis. Right sinus exhibits no maxillary sinus tenderness and no frontal sinus tenderness. Left sinus exhibits no maxillary sinus tenderness and no frontal sinus tenderness.   Mouth/Throat: Uvula is midline, oropharynx is clear and moist and mucous membranes are normal. No trismus in the jaw. Normal dentition. No uvula swelling.   Eyes: " Conjunctivae and lids are normal.   Neck: Trachea normal and phonation normal. Neck supple.   Cardiovascular: Normal rate, regular rhythm, normal heart sounds and normal pulses.   Pulmonary/Chest: Effort normal and breath sounds normal.   Abdominal: Normal appearance and bowel sounds are normal. Soft.   Musculoskeletal: Normal range of motion.         General: Normal range of motion.   Neurological: She is alert and oriented to person, place, and time. She exhibits normal muscle tone.   Skin: Skin is warm, dry and intact.   Psychiatric: Her speech is normal and behavior is normal. Judgment and thought content normal.   Nursing note and vitals reviewed.      Assessment:     1. Cough, unspecified type    2. URI, acute        Plan:       Cough, unspecified type  -     SARS Coronavirus 2 Antigen, POCT Manual Read    URI, acute           Results for orders placed or performed in visit on 08/31/23   SARS Coronavirus 2 Antigen, POCT Manual Read   Result Value Ref Range    SARS Coronavirus 2 Antigen Negative Negative     Acceptable Yes

## 2023-11-01 ENCOUNTER — TELEPHONE (OUTPATIENT)
Dept: OBSTETRICS AND GYNECOLOGY | Facility: CLINIC | Age: 48
End: 2023-11-01

## 2023-11-14 ENCOUNTER — TELEPHONE (OUTPATIENT)
Dept: OBSTETRICS AND GYNECOLOGY | Facility: CLINIC | Age: 48
End: 2023-11-14

## 2023-11-14 NOTE — TELEPHONE ENCOUNTER
Spoke with pt, states her pcp ordered her mammogram. She had it done at [facility], then needed an ultrasound & an MRI both of which are abnormal. Pt. Now needs a breast biopsy, they told her she needs to see a surgeon for three spots.  Pt is worried & would like to know who she should see.  Has appt with  on 11-30-23.    Would like to speak to  once she reviews her results.   I called [facility] to have them fax us all reports.

## 2023-11-14 NOTE — TELEPHONE ENCOUNTER
Arleth Guerra Staff4 hours ago (11:40 AM)     CM  Bone pt called in patient called requesting to speak to Samina or Dr Benitez , regarding abnormal MMG and her MRI. Pt would like a call to discuss results pt went to Yanique Castro [phone]  Arleth Guerra

## 2023-11-16 ENCOUNTER — TELEPHONE (OUTPATIENT)
Dept: OBSTETRICS AND GYNECOLOGY | Facility: CLINIC | Age: 48
End: 2023-11-16
Payer: MEDICARE

## 2023-11-16 NOTE — TELEPHONE ENCOUNTER
----- Message from Festus Benitez MD sent at 11/15/2023  6:49 PM CST -----  Spoke to pt   Considering coming to [facility] for her breast care   Has appt with Murali on Nov 30th for bx at [facility]

## 2023-11-16 NOTE — PROGRESS NOTES
Spoke to pt   Considering coming to [facility] for her breast care   Has appt with Murali on Nov 30th for bx at [facility]

## 2023-11-16 NOTE — TELEPHONE ENCOUNTER
Can we see if the breast center [facility] - Dr Lawrence or Ramiro can see her soon   mar  at [facility]   [phone]  - she's the supervisor    Pt with a mass 2cm in right breast and referred directly to surgeon for bx MMG u/s and MRI done at [facility]   If need to will see Dr Carrion but wanting to see a breast surgeon law if it is cancer (Rivere, Elder or Myles at [facility])

## 2023-11-21 ENCOUNTER — TELEPHONE (OUTPATIENT)
Dept: SURGERY | Facility: CLINIC | Age: 48
End: 2023-11-21
Payer: MEDICARE

## 2023-11-21 NOTE — TELEPHONE ENCOUNTER
----- Message from Prabhjot Dumas LPN sent at 11/21/2023  1:32 PM CST -----  Hello, the patient had her imaging at [facility] and she would need to go through our abnormal mammogram protocol to be scheduled.    We would need to get the patients images from [facility] for our Radiologist to review.  The patient can be scheduled here at [facility] but her imaging would need to be reviewed first by the radiologist before she can be scheduled.   Thanks,  Prabhjot   ----- Message -----  From: Samina Jin MA  Sent: 11/20/2023   4:44 PM CST  To: [facility]    Hi, this pt needs a breast biopsy but had her imaging studies performed at [facility], reports are scanned in but not the films.  was wondering if she would be able to be scheduled for her biopsy over there? If so, when would the next opening be?    ThanksSamina

## 2023-11-22 ENCOUNTER — HOSPITAL ENCOUNTER (OUTPATIENT)
Dept: RADIOLOGY | Facility: HOSPITAL | Age: 48
Discharge: HOME | End: 2023-11-22
Attending: PHYSICIAN ASSISTANT
Payer: MEDICARE

## 2023-11-22 PROCEDURE — 76140 X-RAY CONSULTATION: CPT | Mod: ,,, | Performed by: RADIOLOGY

## 2023-11-22 PROCEDURE — 76140 NOMH MAMMO INTERPRETATION OF OUTSIDE FILMS: ICD-10-PCS | Mod: ,,, | Performed by: RADIOLOGY

## 2023-11-30 ENCOUNTER — HOSPITAL ENCOUNTER (OUTPATIENT)
Dept: RADIOLOGY | Facility: HOSPITAL | Age: 48
Discharge: HOME | End: 2023-11-30
Attending: PHYSICIAN ASSISTANT
Payer: MEDICARE

## 2023-11-30 ENCOUNTER — OFFICE VISIT (OUTPATIENT)
Dept: SURGERY | Facility: CLINIC | Age: 48
End: 2023-11-30
Payer: MEDICARE

## 2023-11-30 ENCOUNTER — PATIENT MESSAGE (OUTPATIENT)
Dept: HEMATOLOGY/ONCOLOGY | Facility: CLINIC | Age: 48
End: 2023-11-30
Payer: MEDICARE

## 2023-11-30 VITALS
HEART RATE: 80 BPM | BODY MASS INDEX: 25.1 KG/M2 | HEIGHT: 64 IN | WEIGHT: 147 LBS | SYSTOLIC BLOOD PRESSURE: 109 MMHG | DIASTOLIC BLOOD PRESSURE: 67 MMHG

## 2023-11-30 DIAGNOSIS — R92.8 ABNORMAL MAMMOGRAM: Primary | ICD-10-CM

## 2023-11-30 DIAGNOSIS — R92.8 ABNORMAL FINDING ON BREAST IMAGING: ICD-10-CM

## 2023-11-30 DIAGNOSIS — Z80.9 FAMILY HISTORY OF CANCER: ICD-10-CM

## 2023-11-30 PROCEDURE — 76642 ULTRASOUND BREAST LIMITED: CPT | Mod: TC,RT

## 2023-11-30 PROCEDURE — 99999 PR PBB SHADOW E&M-EST. PATIENT-LVL III: ICD-10-PCS | Mod: PBBFAC,,, | Performed by: SURGERY

## 2023-11-30 PROCEDURE — 99999 PR PBB SHADOW E&M-EST. PATIENT-LVL III: CPT | Mod: PBBFAC,,, | Performed by: SURGERY

## 2023-11-30 PROCEDURE — 1159F MED LIST DOCD IN RCRD: CPT | Mod: CPTII,S$GLB,, | Performed by: SURGERY

## 2023-11-30 PROCEDURE — 76642 US BREAST RIGHT LIMITED: ICD-10-PCS | Mod: 26,RT,, | Performed by: RADIOLOGY

## 2023-11-30 PROCEDURE — 99203 OFFICE O/P NEW LOW 30 MIN: CPT | Mod: S$GLB,,, | Performed by: SURGERY

## 2023-11-30 PROCEDURE — 77061 MAMMO DIGITAL DIAGNOSTIC RIGHT WITH TOMO: ICD-10-PCS | Mod: 26,RT,, | Performed by: RADIOLOGY

## 2023-11-30 PROCEDURE — 77061 BREAST TOMOSYNTHESIS UNI: CPT | Mod: 26,RT,, | Performed by: RADIOLOGY

## 2023-11-30 PROCEDURE — 3078F DIAST BP <80 MM HG: CPT | Mod: CPTII,S$GLB,, | Performed by: SURGERY

## 2023-11-30 PROCEDURE — 1159F PR MEDICATION LIST DOCUMENTED IN MEDICAL RECORD: ICD-10-PCS | Mod: CPTII,S$GLB,, | Performed by: SURGERY

## 2023-11-30 PROCEDURE — 3074F PR MOST RECENT SYSTOLIC BLOOD PRESSURE < 130 MM HG: ICD-10-PCS | Mod: CPTII,S$GLB,, | Performed by: SURGERY

## 2023-11-30 PROCEDURE — 77065 DX MAMMO INCL CAD UNI: CPT | Mod: TC,RT

## 2023-11-30 PROCEDURE — 3008F PR BODY MASS INDEX (BMI) DOCUMENTED: ICD-10-PCS | Mod: CPTII,S$GLB,, | Performed by: SURGERY

## 2023-11-30 PROCEDURE — 3078F PR MOST RECENT DIASTOLIC BLOOD PRESSURE < 80 MM HG: ICD-10-PCS | Mod: CPTII,S$GLB,, | Performed by: SURGERY

## 2023-11-30 PROCEDURE — 99203 PR OFFICE/OUTPT VISIT, NEW, LEVL III, 30-44 MIN: ICD-10-PCS | Mod: S$GLB,,, | Performed by: SURGERY

## 2023-11-30 PROCEDURE — 3074F SYST BP LT 130 MM HG: CPT | Mod: CPTII,S$GLB,, | Performed by: SURGERY

## 2023-11-30 PROCEDURE — 76642 ULTRASOUND BREAST LIMITED: CPT | Mod: 26,RT,, | Performed by: RADIOLOGY

## 2023-11-30 PROCEDURE — 3008F BODY MASS INDEX DOCD: CPT | Mod: CPTII,S$GLB,, | Performed by: SURGERY

## 2023-11-30 PROCEDURE — 77065 DX MAMMO INCL CAD UNI: CPT | Mod: 26,RT,, | Performed by: RADIOLOGY

## 2023-11-30 PROCEDURE — 77065 MAMMO DIGITAL DIAGNOSTIC RIGHT WITH TOMO: ICD-10-PCS | Mod: 26,RT,, | Performed by: RADIOLOGY

## 2023-11-30 NOTE — PROGRESS NOTES
Breast Surgery  [facility]  Department of Surgery      REFERRING PROVIDER: Festus Benitez MD  [address]  [address]  [place] [address]    Chief Complaint: Abnormal Mammogram (New Patient Abn MMG .)      Subjective:      Patient ID: Yanique Mcdermott is a 48 y.o. female who presents with abnormal mammo and MRI from outside, [facility].  Multiple findings noted.    Follow-up outside [facility] showed lass suspicion and will have diagnostic studies today.    Patient does routinely do self breast exams.  Patient has not noted a change on breast exam.  Patient denies nipple discharge. Patient denies to previous breast biopsy. Patient denies a personal history of breast cancer.    TC 14%, Het dense    Past Medical History:   Diagnosis Date    Abnormal Pap smear of cervix 2010    Hpv (Dr Benitez)    Anemia     Anxiety     Depression     Fibromyalgia     History of kidney stones     HPV (human papilloma virus) infection 2010    LEEP Done 5-24-12 (Dr Benitez)    Hx of thyroid nodule 1999    Pelvic pain     PONV (postoperative nausea and vomiting)     Thyroid disease     Hypo    Tobacco use      Past Surgical History:   Procedure Laterality Date    AUGMENTATION OF BREAST      BREAST AUGMENTATION  1994    CERVICAL BIOPSY  W/ LOOP ELECTRODE EXCISION  05/24/2012    Dr Benitez    INJECTION, SACROILIAC JOINT Bilateral 9/14/2022    Procedure: INJECTION,SACROILIAC JOINT bilateral;  Surgeon: Rica Gonzalez MD;  Location: [facility];  Service: Pain Management;  Laterality: Bilateral;    PELVIC LAPAROSCOPY  2000, 2017    THYROIDECTOMY, PARTIAL  1999    WISDOM TOOTH EXTRACTION  1990     Current Outpatient Medications on File Prior to Visit   Medication Sig Dispense Refill    azelastine (ASTELIN) 137 mcg (0.1 %) nasal spray INSTILL 1 SPRAY INTO EACH NOSTRIL TWICE A DAY FOR 30 DAYS      cholecalciferol, vitamin D3, 1,250 mcg (50,000 unit) capsule Take 50,000 Units by mouth every 7 days.      DULoxetine (CYMBALTA) 60 MG capsule  Take 60 mg by mouth once daily.  0    fluticasone propionate (FLONASE) 50 mcg/actuation nasal spray 2 sprays by Each Nostril route once daily.      loratadine (CLARITIN) 10 mg tablet Take 1 tablet (10 mg total) by mouth once daily. 30 tablet 2    multivitamin (THERAGRAN) per tablet Take 1 tablet by mouth once daily.      ondansetron (ZOFRAN-ODT) 4 MG TbDL DISSOLVE 1 TABLET IN MOUTH EVERY 8 HOURS FOR 3 DAYS  0    progesterone (PROMETRIUM) 100 MG capsule Take 1 capsule (100 mg total) by mouth once daily. 90 capsule 3    benzonatate (TESSALON PERLES) 100 MG capsule Take 2 capsules (200 mg total) by mouth 3 (three) times daily as needed for Cough. (Patient not taking: Reported on 11/30/2023) 30 capsule 1    CLENPIQ 10 mg-3.5 gram -12 gram/160 mL Soln TAKE PREP BY MOUTH SPLIT DOSES AS DIRECTED FOR COLONOSCOPY PREP      DULoxetine (CYMBALTA) 30 MG capsule Take 30 mg by mouth.      levothyroxine (SYNTHROID) 88 MCG tablet TAKE 1 TABLET BY MOUTH EVERY DAY 30 tablet 0    meloxicam (MOBIC) 15 MG tablet Take 15 mg by mouth once daily.      NURTEC 75 mg odt Take 75 mg by mouth daily as needed.       No current facility-administered medications on file prior to visit.     Social History     Socioeconomic History    Marital status:    Tobacco Use    Smoking status: Former     Types: Vaping with nicotine     Passive exposure: Current    Smokeless tobacco: Never   Substance and Sexual Activity    Alcohol use: Yes     Comment: Social    Drug use: No    Sexual activity: Yes     Partners: Male     Birth control/protection: None     Comment: :  Back with Ex     Family History   Problem Relation Age of Onset    Prostate cancer Paternal Grandfather     Cancer Paternal Grandfather     Breast cancer Maternal Grandmother     Cancer Maternal Grandmother     Alzheimer's disease Maternal Grandmother     Colon cancer Maternal Grandfather     Cancer Maternal Grandfather     Pancreatic cancer Maternal Grandfather     Diabetes Father  "    Hypertension Father     Cancer Mother     Brain cancer Mother     Breast cancer Maternal Aunt     Breast cancer Other         pat gr aunt    Ovarian cancer Neg Hx         Review of Systems   Constitutional:  Negative for appetite change, chills, fever and unexpected weight change.   HENT:  Negative for facial swelling, postnasal drip and sore throat.    Eyes:  Negative for redness and itching.   Respiratory:  Negative for chest tightness and shortness of breath.    Cardiovascular:  Negative for chest pain and palpitations.   Gastrointestinal:  Negative for blood in stool, diarrhea, nausea and vomiting.   Genitourinary:  Negative for difficulty urinating and dysuria.   Musculoskeletal:  Negative for arthralgias and joint swelling.   Skin:  Negative for rash and wound.   Neurological:  Negative for dizziness and syncope.   Hematological:  Negative for adenopathy.   Psychiatric/Behavioral:  Negative for agitation. The patient is not nervous/anxious.      Objective:   /67 (BP Location: Right arm, Patient Position: Sitting, BP Method: Medium (Automatic))   Pulse 80   Ht 5' 4" (1.626 m)   Wt 66.7 kg (147 lb)   LMP 11/01/2023   BMI 25.23 kg/m²     Physical Exam   HENT:   Head: Normocephalic and atraumatic.   Eyes: Conjunctivae are normal. No scleral icterus.   Cardiovascular:  Normal rate, regular rhythm and normal pulses.            Pulmonary/Chest: Effort normal and breath sounds normal. No accessory muscle usage or stridor. No respiratory distress. She has no wheezes. Right breast exhibits no inverted nipple, no mass, no nipple discharge and no skin change. Left breast exhibits no inverted nipple, no mass, no nipple discharge and no skin change.   Abdominal: Soft. Normal appearance. She exhibits no mass.   Musculoskeletal: No deformity. Lymphadenopathy:      Cervical: No cervical adenopathy.     Neurological: She is alert.   Skin: Skin is warm and dry.     Psychiatric: Mood and judgment normal. "       Radiology review: Images personally reviewed by me in the clinic.   Reviewed    Assessment:       1. Abnormal mammogram    2. Family history of cancer        Plan:     Options for management were discussed with the patient.   We discussed the need for diagnostic imaging with mammogram and ultrasound.  I explained that our interpretation of her outside images showed a low suspicion for malignancy however to best assess we noted abnormalities, we have set her up for a diagnostic mammogram possible ultrasound today as sometimes the studies are difficult to assess on outside films.  We will await the radiology recommendations based off today's study.  She and I also had a long discussion about her family history and she is interested in seeking genetic testing.  I will send her to the genetic counselor to review exactly what this entails and how to be prepared for it if she does decide to undergo testing.  We did discuss the potential outcomes of testing and considerations for life insurance if she does proceed.   All her questions were answered.    Total time spent with the patient: 30 minutes.  20 minutes of face to face consultation and 10 minutes of chart review and coordination of care.     ADDENDUM:  [facility] recs from today:    BI-RADS Category:   Overall: 1 - Negative        Recommendation:  Annual screening mammography, next due October 2024.    Consider additional evaluation in [facility] and screening bilateral breast MRI with IV contrast in 1 year.  She describes having family members who have undergone mastectomy for unknown pathology.  It is possible she is high risk for breast cancer.       The findings and recommendations were discussed directly with her by Dr. HARRIS Xavier at the time of interpretation.        Your estimated lifetime risk of breast cancer (to age 85) based on Tyrer-Cuzick risk assessment model is Tyrer-Cuzick: 16.12 %. According to the American Cancer Society,  patients with a lifetime breast cancer risk of 20% or higher might benefit from supplemental screening tests.

## 2023-11-30 NOTE — Clinical Note
"Beto Mortensen,   Hope all is well!  I have seen a run of these lately from outside imaging, particularly [facility].  I'm just letting you know because I know you, and your group are usually very close to your patients and help talk them through situations like these.   They mention multiple abnormalities on imaging studies, but our review shows consistency for several years at those areas which helped to reassure her.   I know this is stressful for all involved!!  So feel free to reach out if you guys need help with anything that doesn't seem to make sense (like when the mammo recommendation is for "surgical consultation for biopsy").  We should almost never need to go straight to surgical biopsy without a needle biopsy first.  That goes directly against guidelines.   Sorry for the rant!  But I know you are such a patient advocate and would want to make sure there are not women out there having unnecessary surgery!  Thanks for sending her over!  I'm going to have her meet with genetics as well.  Kyle, Judie"

## 2023-12-04 PROBLEM — R92.8 ABNORMAL MAMMOGRAM: Status: ACTIVE | Noted: 2023-12-04

## 2023-12-04 PROBLEM — Z80.9 FAMILY HISTORY OF CANCER: Status: ACTIVE | Noted: 2023-12-04

## 2024-04-02 ENCOUNTER — TELEPHONE (OUTPATIENT)
Dept: PAIN MEDICINE | Facility: CLINIC | Age: 49
End: 2024-04-02
Payer: MEDICARE

## 2024-04-02 ENCOUNTER — OFFICE VISIT (OUTPATIENT)
Dept: PAIN MEDICINE | Facility: CLINIC | Age: 49
End: 2024-04-02
Payer: MEDICARE

## 2024-04-02 VITALS
BODY MASS INDEX: 26.29 KG/M2 | HEIGHT: 64 IN | WEIGHT: 154 LBS | DIASTOLIC BLOOD PRESSURE: 83 MMHG | SYSTOLIC BLOOD PRESSURE: 126 MMHG | HEART RATE: 106 BPM

## 2024-04-02 DIAGNOSIS — G89.4 CHRONIC PAIN SYNDROME: ICD-10-CM

## 2024-04-02 DIAGNOSIS — M53.3 SACROILIAC JOINT PAIN: Primary | ICD-10-CM

## 2024-04-02 DIAGNOSIS — M46.1 SACROILIITIS: ICD-10-CM

## 2024-04-02 DIAGNOSIS — M53.3 COCCYDYNIA: ICD-10-CM

## 2024-04-02 DIAGNOSIS — G89.29 CHRONIC BILATERAL LOW BACK PAIN WITHOUT SCIATICA: ICD-10-CM

## 2024-04-02 DIAGNOSIS — M54.50 CHRONIC BILATERAL LOW BACK PAIN WITHOUT SCIATICA: ICD-10-CM

## 2024-04-02 PROCEDURE — 3074F SYST BP LT 130 MM HG: CPT | Mod: CPTII,S$GLB,, | Performed by: STUDENT IN AN ORGANIZED HEALTH CARE EDUCATION/TRAINING PROGRAM

## 2024-04-02 PROCEDURE — 99999 PR PBB SHADOW E&M-EST. PATIENT-LVL III: CPT | Mod: PBBFAC,,, | Performed by: STUDENT IN AN ORGANIZED HEALTH CARE EDUCATION/TRAINING PROGRAM

## 2024-04-02 PROCEDURE — 3079F DIAST BP 80-89 MM HG: CPT | Mod: CPTII,S$GLB,, | Performed by: STUDENT IN AN ORGANIZED HEALTH CARE EDUCATION/TRAINING PROGRAM

## 2024-04-02 PROCEDURE — 99213 OFFICE O/P EST LOW 20 MIN: CPT | Mod: PBBFAC,PO | Performed by: STUDENT IN AN ORGANIZED HEALTH CARE EDUCATION/TRAINING PROGRAM

## 2024-04-02 PROCEDURE — 1159F MED LIST DOCD IN RCRD: CPT | Mod: CPTII,S$GLB,, | Performed by: STUDENT IN AN ORGANIZED HEALTH CARE EDUCATION/TRAINING PROGRAM

## 2024-04-02 PROCEDURE — 3008F BODY MASS INDEX DOCD: CPT | Mod: CPTII,S$GLB,, | Performed by: STUDENT IN AN ORGANIZED HEALTH CARE EDUCATION/TRAINING PROGRAM

## 2024-04-02 PROCEDURE — 99214 OFFICE O/P EST MOD 30 MIN: CPT | Mod: S$GLB,,, | Performed by: STUDENT IN AN ORGANIZED HEALTH CARE EDUCATION/TRAINING PROGRAM

## 2024-04-02 NOTE — TELEPHONE ENCOUNTER
----- Message from James Wilkerson DO sent at 2024  3:28 PM CDT -----  Regarding: Order for JULIANNE GIFFORD    Patient Name: JULIANNE GIFFORD([id number])  Sex: Female  : 1975      PCP: RUSSEL, PRIMARY DOCTOR    Center: [facility] ([facility])     Level of Service:95382     [facility] OFFICE/OUTPT VISIT, EST, LEVL IV, 30-39 MIN    Types of orders made on 2024: Procedure Request    Order Date:2024  Ordering User:JAMES WILKERSON [[id number]  Encounter Provider:James Wilkerson DO [[id number]]  Authorizing Provider: James Wilkerson DO [[id number]]  Department:[facility]IN [facility]NAGEMENT[[id number]]    Common Order Information  Procedure -> Other (Specify location and laterality) Cmt: Diagnostic Block of             Bilateral L5 DR, and Lateral Branches of S1 and S2    Pre-op Diagnosis -> Bilateral sacroiliitis     Order Specific Information  Order:   bProcedure Order to Pain Management [Custom: [id number]]  Order #:          [id number]: 1 FUTURE    Priority: Routine  Class: Clinic Performed    Future Order Information      Expires on:2025            Expected by:2024                   Associated Diagnoses      M53.3 Sacroiliac joint pain      M46.1 Sacroiliitis      Physician -> Rock         Facility Name: -> [facility]         Follow-up: -  > 1 week Cmt: David White          Priority: Routine  Class: Clinic Performed    Future Order Information      Expires on:2025            Expected by:2024                   Associated Diagnoses      M53.3 Sacroiliac joint pain      M46.1 Sacroiliitis      Procedure -> Other (Specify location and laterality) Cmt: Diagnostic Block                 of Bilateral L5 DR, and Lateral Branches of S1 and S2          Physician -> Rock         Pre-op Diagnosis -> Bilateral sacroiliitis         Facility Name: -> [facility]         Follow-up: -> 1 week Cmt: David White

## 2024-04-02 NOTE — PROGRESS NOTES
[facility]  Established clinic visit    Chief Complaint:   Chief Complaint   Patient presents with    Tailbone Pain    Hip Pain     History of Present Illness: aYnique Mcdermott is a 48 y.o. female here for LBP.      Onset: Back pain started after her 18 year old was born.   Location: diffusely in the lumbar and sacral area and over her coccyx  Radiation: bilaterally, worse on the right to the mid-thigh above the knee  Timing: constant  Quality: Aching, Burning, Throbbing, and Deep  Exacerbating Factors: exercise, flexion, and lifting, leaning back, being one position too long  Alleviating Factors: heat, ice, laying down, massage, medications, physical therapy, and rest, and repositioning  Associated Symptoms: She denies night fever/night sweats, urinary incontinence, bowel incontinence, significant weight loss, significant motor weakness, and loss of sensations    She has tried chiropractor, physical therapy, bracing, acupuncture, medications, heat/cold. She found that pain pills help the most.     Severity: Currently: 8/10   Typical Range: 6-9/10     Exacerbation: 9/10     Pain Disability Index  Family/Home Responsibilities:: 9  Recreation:: 9  Social Activity:: 9  Occupation:: 9  Sexual Behavior:: 9  Self Care:: 6  Life-Support Activities:: 0  Pain Disability Index (PDI): 51    Interval History (09/27/2022):  Yanique Mcdermott returns today for follow up.  She is status post a bilateral SI joint injection reporting 50% relief of her pain.  She continues to complain of pain across her low back in a bandlike distribution pain is worse when performing ADLs patient reports while he wife has been diminished due to increased pain she also reporting pain in the lateral aspect of her hips bilaterally. Currently, the low back pain is the worse.    Current Pain Scales:  Current: 4/10              Typical Range: 4-5/10         Interval update 04/02/24:  Patient presents in clinic today for follow up of her  SI joint pain and tail bone pain.  She was last seen by Dr. Gonzalez in 2022.  Since that time, she has been following with [facility].  She has undergone Lumbar RFA with good relief.  She continues to SI joint and tailbone pain.  Her SI joint pain has responded well to steroid injections in the past with >50% relief.  She is currently scheduled for a diagnostic block of the dorsal rami of L5, S1, and S2 with [facility] for her SI joint pain. She is interested in switching providers to this clinic. Rates her pain 7/10.         Previous Interventions:  - 09/14/2022 bilateral SI joint injections 50% relief  - 02/2023 - bilateral SI joint injections with  [facility]- 3 months relief  - 09/2023 - bilateral RFA L3, L4, L5       Previous Therapies:  PT/OT: yes   Relevant Surgery: no   Previous Medications:   - NSAIDS: Diclofenac, ibuprofen, tylenol, meloxicam.  - Muscle Relaxants: skelaxin.  Soma caused hives. Tizanidine helped in the past.   - TCAs:   - SNRIs: Cymbalta  - Topicals: voltaren  - Anticonvulsants:    - Opioids: Allergy to codeine. Tramadol    Current Pain Medications:  Cymbalta 60 mg daily  Voltaren 75 mg   Tramadol     Blood Thinners: None    Full Medication List:    Current Outpatient Medications:     azelastine (ASTELIN) 137 mcg (0.1 %) nasal spray, INSTILL 1 SPRAY INTO EACH NOSTRIL TWICE A DAY FOR 30 DAYS, Disp: , Rfl:     benzonatate (TESSALON PERLES) 100 MG capsule, Take 2 capsules (200 mg total) by mouth 3 (three) times daily as needed for Cough., Disp: 30 capsule, Rfl: 1    cholecalciferol, vitamin D3, 1,250 mcg (50,000 unit) capsule, Take 50,000 Units by mouth every 7 days., Disp: , Rfl:     DULoxetine (CYMBALTA) 60 MG capsule, Take 60 mg by mouth once daily., Disp: , Rfl: 0    fluticasone propionate (FLONASE) 50 mcg/actuation nasal spray, 2 sprays by Each Nostril route once daily., Disp: , Rfl:     loratadine (CLARITIN) 10 mg tablet, Take 1 tablet (10 mg  total) by mouth once daily., Disp: 30 tablet, Rfl: 2    meloxicam (MOBIC) 15 MG tablet, Take 15 mg by mouth once daily., Disp: , Rfl:     multivitamin (THERAGRAN) per tablet, Take 1 tablet by mouth once daily., Disp: , Rfl:     NURTEC 75 mg odt, Take 75 mg by mouth daily as needed., Disp: , Rfl:     ondansetron (ZOFRAN-ODT) 4 MG TbDL, DISSOLVE 1 TABLET IN MOUTH EVERY 8 HOURS FOR 3 DAYS, Disp: , Rfl: 0    progesterone (PROMETRIUM) 100 MG capsule, Take 1 capsule (100 mg total) by mouth once daily., Disp: 90 capsule, Rfl: 3     Review of Systems:  ROS    GENERAL:  No weight loss, malaise or fevers.  HEENT:   No recent changes in vision or hearing  NECK:  No difficulty with swallowing. No stridor.   RESPIRATORY:  Negative for cough, wheezing or shortness of breath, patient denies any recent URI.  CARDIOVASCULAR:  Negative for chest pain, leg swelling or palpitations.  GI:  Negative for abdominal discomfort, blood in stools or black stools or change in bowel habits.  MUSCULOSKELETAL:  See HPI.  SKIN:  Negative for lesions, rash, and itching.  PSYCH:  No mood disorder or recent psychosocial stressors.    HEMATOLOGY/LYMPHOLOGY:  Negative for prolonged bleeding, bruising easily or swollen nodes.  Patient is not currently taking any anti-coagulants  NEURO:   No history of headaches, syncope, paralysis, seizures or tremors.  All other reviewed and negative other than HPI.    Allergies:  Minocin [minocycline], Soma [carisoprodol], Bactrim [sulfamethoxazole-trimethoprim], Clarithromycin, Codeine, and Sulfadiazine     Medical History:   has a past medical history of Abnormal Pap smear of cervix (2010), Anemia, Anxiety, Depression, Fibromyalgia, History of kidney stones, HPV (human papilloma virus) infection (2010), thyroid nodule (1999), Pelvic pain, PONV (postoperative nausea and vomiting), Thyroid disease, and Tobacco use.    Surgical History:   has a past surgical history that includes Thyroidectomy, partial (1999); BREAST  "AUGMENTATION (1994); Pelvic laparoscopy (2000, 2017); Fellows tooth extraction (1990); Cervical biopsy w/ loop electrode excision (05/24/2012); Augmentation of breast; and injection, sacroiliac joint (Bilateral, 9/14/2022).    Family History:  family history includes Alzheimer's disease in her maternal grandmother; Brain cancer in her mother; Breast cancer in her maternal aunt, maternal grandmother, and another family member; Cancer in her maternal grandfather, maternal grandmother, mother, and paternal grandfather; Colon cancer in her maternal grandfather; Diabetes in her father; Hypertension in her father; Pancreatic cancer in her maternal grandfather; Prostate cancer in her paternal grandfather.    Social History:   reports that she has quit smoking. Her smoking use included vaping with nicotine. She has been exposed to tobacco smoke. She has never used smokeless tobacco. She reports current alcohol use. She reports that she does not use drugs.    Physical Exam:  /83   Pulse 106   Ht 5' 4" (1.626 m)   Wt 69.8 kg (153 lb 15.9 oz)   BMI 26.43 kg/m²     PHYSICAL EXAMINATION:    GENERAL: Well appearing, in no acute distress, alert and oriented x3.  PSYCH:  Mood and affect appropriate.  SKIN: Skin color, texture, turgor normal, no rashes or lesions.  HEAD/FACE:  Normocephalic, atraumatic. Cranial nerves grossly intact.  NECK: Normal ROM. Supple.   CV: RRR with palpation of the radial artery.  PULM: No evidence of respiratory difficulty, symmetric chest rise.  GI:  Soft and non-distended.  MSK: Straight leg raising is negative to radicular pain. No pain to palpation over the facet joints of the lumbar spine. No pain with lumbar facet loading. + pain over the SI joints. Sacral Thrust is positive.  TRAMAINE test is positive.   Gaenslen's positive. Normal range of motion without pain reproduction.  Peripheral joint ROM is full and pain free without obvious instability or laxity in all four extremities. No " deformities, edema, or skin discoloration.  No atrophy or tone abnormalities are noted.   NEURO: Bilateral upper and lower extremity coordination and strength is symmetric.  No loss of sensation is noted.  MENTAL STATUS: A x O x 3, good concentration, speech is fluent and goal directed  MOTOR: 5/5 in all muscle groups  GAIT: normal.  Ambulates unassisted.       Imaging:  - MRI Lumbar spine 8/17/22:  Anatomic alignment of the osseous structures of the lumbar spine is present. No evidence of spondylolysis or spondylolisthesis.  Straightening of the normal lumbar lordosis. This is likely secondary to some degree of muscle spasm or strain and may be related to trauma.  The bone marrow signal intensity of the osseous elements of the lumbar spine is normal. There is no evidence of fracture, bone marrow edema or bone marrow replacement process within the lumbar spine, visualized elements of the lower thoracic spine or the sacrum.  The anterior longitudinal spinal ligament, posterior longitudinal spinal ligament, ligamentum flavum, interspinous ligaments and supraspinal ligaments are intact at all locations.  The conus medullaris is normal in position and appearance. Its tip lies posterior to the cephalic aspect of the L1 vertebral body.  The prevertebral soft tissues are normal. The paraspinal musculature is normal. The visualized retroperitoneal soft tissues are normal.  T11-T12: Normal signal intensity within the nucleus pulposus and normal disc contour. No evidence of neural foraminal or central vertebral canal stenosis.      T12-L1: Normal signal intensity within the nucleus pulposus and normal disc contour. No evidence of neural foraminal or central vertebral canal stenosis.  L1-L2: Normal signal intensity within the nucleus pulposus and normal disc contour. No evidence of neural foraminal or central vertebral canal stenosis.   L2-L3: Normal signal intensity within the nucleus pulposus and normal disc contour. No  evidence of neural foraminal or central vertebral canal stenosis.  L3-L4: Diminished signal intensity in the nucleus pulposus with difuse bulge of the annulus fibrosis causing moderate bilateral neural foraminal stenosis which is equivalent side to side. No central vertebral canal stenosis.  L4-L5: Diminished signal intensity in the nucleus pulposus with difuse bulge of the annulus fibrosis causing moderate bilateral neural foraminal stenosis which is equivalent side to side. No central vertebral canal stenosis.  L5-S1: Diminished signal intensity in the nucleus pulposus with difuse bulge of the annulus fibrosis causing moderate bilateral neural foraminal stenosis which is equivalent side to side. No central vertebral canal stenosis.  The facet joints at all  levels of the lumbar spine exhibit mild-to-moderate changes of osteoarthritis consisting of thinning of the articular cartilage with periarticular sclerosis,  marginal osteophyte formation and ligamentum flavum hypertrophy. Bilateral facet arthropathy, most apparent at L3-S1.  IMPRESSION   1.  Straightening of the normal lumbar lordosis. This is likely secondary to some degree of muscle spasm or strain and may be related to trauma.   2. Neural foraminal stenosis as a consequence of diffuse buldge of the annulous fibrosis, bilaterally, L3-L4, L4-L5 and L5-S1. Findings are new and developed in the interval since the prior exam.   3.  Bilateral facet arthropathy, most apparent at L3-S1. Interval worsening since the prior exam.      - Xray Cervical Spine 08/29/2022:  FINDINGS:  Vertebral bodies are intact.  Mild narrowing of the C6-C7 disc space can be seen.  No instability in flexion and extension is noted.  No significant bony spurring is seen.     -Xray Lumbar Spine 05/14/2021:  FINDINGS:  Lumbar vertebral body heights are maintained.  Disc spaces are maintained.  Degenerative facet hypertrophic changes L4-5 and L5-S1.  AP alignment is anatomic.      XR SACRUM  "AND COCCYX     CLINICAL HISTORY:  Sacrococcygeal disorders, not elsewhere classified     TECHNIQUE:  Two or three views of the sacrum and coccyx were performed.     COMPARISON:  Lumbar spine 05/14/2021     FINDINGS:  Skeletal structures are intact.  No fracture is identified.  Mild degenerative change is present at the SI joints with no erosions detected.  S1 vertebra has a small cleft between the posterior elements at the midline consistent with spina bifida occulta.     Impression:     No acute abnormality     Electronically signed by: Andry Sanabria MD  Date:                                            09/13/2022    Labs:  BMP  No results found for: "NA", "K", "CL", "CO2", "BUN", "CREATININE", "CALCIUM", "ANIONGAP", "ESTGFRAFRICA", "EGFRNONAA"  No results found for: "ALT", "AST", "GGT", "ALKPHOS", "BILITOT"  Lab Results   Component Value Date     02/07/2018       Assessment:  Yanique Mcdermott is a 48 y.o. female with the following diagnoses based on history, exam, and imaging:    Problem List Items Addressed This Visit          Orthopedic    Sacroiliac joint pain - Primary    Chronic bilateral low back pain without sciatica     Other Visit Diagnoses       Sacroiliitis        Coccydynia        Chronic pain syndrome                  This is a pleasant 48 y.o. lady presenting with:     - Chronic bilateral low back pain: Pain appears primarily due to SIJ dysfunction. Though facetogenic aspects as well on exam.  - Coccydynia  - Comorbidities: Anxiety and Depression. Tobacco use.     09/27/[id number]-old female with a history of chronic low back, bilateral hip and neck pain.  Her worst pain today is her low back upon review of her recent x-rays she does have lumbar facet arthropathy noted at L4-5 and L5-S1.  Based on Dr. Gonzalez's previous plan we will now consider her for diagnostic bilateral lumbar MBBs targeting L4-5 L5-S1 considering her worst pain today is her low back.    04/02/2024 - Yanique Casper" Sharron is a 48 y.o. female who  has a past medical history of Abnormal Pap smear of cervix (2010), Anemia, Anxiety, Depression, Fibromyalgia, History of kidney stones, HPV (human papilloma virus) infection (2010), thyroid nodule (1999), Pelvic pain, PONV (postoperative nausea and vomiting), Thyroid disease, and Tobacco use.  By history and examination this patient has chronic low back pain without radiculopathy.  The underlying cause is sacroiliitis and coccydynia.  Her pain has responded well to SI joint injections previously.  She has most recently been established with an outside pain management group but she was interested in reestablishing here.  She is currently scheduled for a diagnostic block of the L5 DR, and Lateral Branches of S1 and S2 with [facility].  She would like to schedule that procedure here.  Plan for RFA if pain improves.  I also discussed that she may benefit from a ganglion impar/cut schedule joint injection.  We discussed the underlying diagnoses and multiple treatment options including non-opioid medications, interventional procedures, physical therapy, and home exercise.  The risks and benefits of each treatment option were discussed and all questions were answered.        Treatment Plan:   Procedures:  Schedule for diagnostic block of the bilateral L5 DR, and Lateral Branches of S1 and S2.  Plan to proceed with RFA if appropriate.  In the future consider coccygeal joint/ganglion impar injection.  PT/OT/HEP: I have stressed the importance of physical activity and a home exercise plan to help with pain and improve health.  Continue with physical therapy and home exercise regimen.  Medications:    - no new medications were prescribed at this visit.   -  Reviewed and consistent with medication use as prescribed.  Imaging:  Reviewed.  Follow Up: RTC 1 week postprocedure    Enid Wilkerson DO   [facility]    Disclaimer: This note was partly generated using  dictation software which may occasionally result in transcription errors.

## 2024-04-03 ENCOUNTER — TELEPHONE (OUTPATIENT)
Dept: PAIN MEDICINE | Facility: CLINIC | Age: 49
End: 2024-04-03
Payer: MEDICARE

## 2024-04-03 NOTE — TELEPHONE ENCOUNTER
----- Message from James Wilkerson DO sent at 2024  3:28 PM CDT -----  Regarding: Order for JULIANNE GIFFORD    Patient Name: JULIANNE GIFFORD([id number])  Sex: Female  : 1975      PCP: RUSSEL, PRIMARY DOCTOR    Center: [facility] ([facility])     Level of Service:61456     [place] OFFICE/OUTPT VISIT, EST, LEVL IV, 30-39 MIN    Types of orders made on 2024: Procedure Request    Order Date:2024  Ordering User:JAMES WILKERSON [[id number]  Encounter Provider:James Wilkerson DO [[id number]]  Authorizing Provider: James Wilkerson DO [[id number]]  Department:[facility][[id number]]    Common Order Information  Procedure -> Other (Specify location and laterality) Cmt: Diagnostic Block of             Bilateral L5 DR, and Lateral Branches of S1 and S2    Pre-op Diagnosis -> Bilateral sacroiliitis     Order Specific Information  Order:   bProcedure Order to Pain Management [Custom: [id number]]  Order #:          [id number]: 1 FUTURE    Priority: Routine  Class: Clinic Performed    Future Order Information      Expires on:2025            Expected by:2024                   Associated Diagnoses      M53.3 Sacroiliac joint pain      M46.1 Sacroiliitis      Physician -> Rock         Facility Name: -> [facility]         Follow-up: -  > 1 week Cmt: David White          Priority: Routine  Class: Clinic Performed    Future Order Information      Expires on:2025            Expected by:2024                   Associated Diagnoses      M53.3 Sacroiliac joint pain      M46.1 Sacroiliitis      Procedure -> Other (Specify location and laterality) Cmt: Diagnostic Block                 of Bilateral L5 DR, and Lateral Branches of S1 and S2          Physician -> Rock         Pre-op Diagnosis -> Bilateral sacroiliitis         Facility Name: -> [facility]         Follow-up: -> 1 week Cmt: David White

## 2024-04-24 RX ORDER — FLUTICASONE PROPIONATE 50 MCG
1 SPRAY, SUSPENSION (ML) NASAL
Qty: 16 G | Refills: 2 | Status: SHIPPED | OUTPATIENT
Start: 2024-04-24 | End: 2024-06-03

## 2024-05-31 ENCOUNTER — HOSPITAL ENCOUNTER (EMERGENCY)
Facility: HOSPITAL | Age: 49
Discharge: HOME | End: 2024-05-31
Attending: EMERGENCY MEDICINE
Payer: MEDICARE

## 2024-05-31 VITALS
BODY MASS INDEX: 26.29 KG/M2 | TEMPERATURE: 98 F | OXYGEN SATURATION: 98 % | HEIGHT: 64 IN | HEART RATE: 82 BPM | WEIGHT: 154 LBS | RESPIRATION RATE: 18 BRPM | SYSTOLIC BLOOD PRESSURE: 117 MMHG | DIASTOLIC BLOOD PRESSURE: 76 MMHG

## 2024-05-31 DIAGNOSIS — R00.2 PALPITATIONS: Primary | ICD-10-CM

## 2024-05-31 DIAGNOSIS — R00.0 TACHYCARDIA: ICD-10-CM

## 2024-05-31 LAB
ALBUMIN SERPL BCP-MCNC: 4 G/DL (ref 3.5–5.2)
ALP SERPL-CCNC: 87 U/L (ref 55–135)
ALT SERPL W/O P-5'-P-CCNC: 60 U/L (ref 10–44)
ANION GAP SERPL CALC-SCNC: 12 MMOL/L (ref 8–16)
AST SERPL-CCNC: 41 U/L (ref 10–40)
B-HCG UR QL: NEGATIVE
BACTERIA #/AREA URNS AUTO: ABNORMAL /HPF
BASOPHILS # BLD AUTO: 0.06 K/UL (ref 0–0.2)
BASOPHILS NFR BLD: 1 % (ref 0–1.9)
BILIRUB SERPL-MCNC: 0.3 MG/DL (ref 0.1–1)
BILIRUB UR QL STRIP: NEGATIVE
BNP SERPL-MCNC: <10 PG/ML (ref 0–99)
BUN SERPL-MCNC: 13 MG/DL (ref 6–20)
BUN SERPL-MCNC: 15 MG/DL (ref 6–30)
CALCIUM SERPL-MCNC: 10.2 MG/DL (ref 8.7–10.5)
CHLORIDE SERPL-SCNC: 105 MMOL/L (ref 95–110)
CHLORIDE SERPL-SCNC: 105 MMOL/L (ref 95–110)
CLARITY UR REFRACT.AUTO: CLEAR
CO2 SERPL-SCNC: 22 MMOL/L (ref 23–29)
COLOR UR AUTO: YELLOW
CREAT SERPL-MCNC: 1 MG/DL (ref 0.5–1.4)
CREAT SERPL-MCNC: 1 MG/DL (ref 0.5–1.4)
CRP SERPL-MCNC: 5.9 MG/L (ref 0–8.2)
CTP QC/QA: YES
DIFFERENTIAL METHOD BLD: ABNORMAL
EOSINOPHIL # BLD AUTO: 0.2 K/UL (ref 0–0.5)
EOSINOPHIL NFR BLD: 3.6 % (ref 0–8)
ERYTHROCYTE [DISTWIDTH] IN BLOOD BY AUTOMATED COUNT: 13.2 % (ref 11.5–14.5)
ERYTHROCYTE [SEDIMENTATION RATE] IN BLOOD BY PHOTOMETRIC METHOD: 29 MM/HR (ref 0–36)
EST. GFR  (NO RACE VARIABLE): >60 ML/MIN/1.73 M^2
GLUCOSE SERPL-MCNC: 85 MG/DL (ref 70–110)
GLUCOSE SERPL-MCNC: 96 MG/DL (ref 70–110)
GLUCOSE UR QL STRIP: NEGATIVE
HCT VFR BLD AUTO: 39.8 % (ref 37–48.5)
HCT VFR BLD CALC: 41 %PCV (ref 36–54)
HCV AB SERPL QL IA: NORMAL
HGB BLD-MCNC: 12.9 G/DL (ref 12–16)
HGB UR QL STRIP: NEGATIVE
HIV 1+2 AB+HIV1 P24 AG SERPL QL IA: NORMAL
HYALINE CASTS UR QL AUTO: 20 /LPF
IMM GRANULOCYTES # BLD AUTO: 0.02 K/UL (ref 0–0.04)
IMM GRANULOCYTES NFR BLD AUTO: 0.3 % (ref 0–0.5)
INFLUENZA A, MOLECULAR: NOT DETECTED
INFLUENZA B, MOLECULAR: NOT DETECTED
KETONES UR QL STRIP: NEGATIVE
LEUKOCYTE ESTERASE UR QL STRIP: ABNORMAL
LYMPHOCYTES # BLD AUTO: 2 K/UL (ref 1–4.8)
LYMPHOCYTES NFR BLD: 34.8 % (ref 18–48)
MAGNESIUM SERPL-MCNC: 2 MG/DL (ref 1.6–2.6)
MCH RBC QN AUTO: 30.1 PG (ref 27–31)
MCHC RBC AUTO-ENTMCNC: 32.4 G/DL (ref 32–36)
MCV RBC AUTO: 93 FL (ref 82–98)
MICROSCOPIC COMMENT: ABNORMAL
MONOCYTES # BLD AUTO: 0.4 K/UL (ref 0.3–1)
MONOCYTES NFR BLD: 7.3 % (ref 4–15)
NEUTROPHILS # BLD AUTO: 3.1 K/UL (ref 1.8–7.7)
NEUTROPHILS NFR BLD: 53 % (ref 38–73)
NITRITE UR QL STRIP: NEGATIVE
NRBC BLD-RTO: 0 /100 WBC
OHS QRS DURATION: 72 MS
OHS QRS DURATION: 76 MS
OHS QTC CALCULATION: 411 MS
OHS QTC CALCULATION: 415 MS
PH UR STRIP: 6 [PH] (ref 5–8)
PLATELET # BLD AUTO: 315 K/UL (ref 150–450)
PMV BLD AUTO: 9 FL (ref 9.2–12.9)
POC IONIZED CALCIUM: 1.2 MMOL/L (ref 1.06–1.42)
POC TCO2 (MEASURED): 27 MMOL/L (ref 23–29)
POTASSIUM BLD-SCNC: 4.4 MMOL/L (ref 3.5–5.1)
POTASSIUM SERPL-SCNC: 4.6 MMOL/L (ref 3.5–5.1)
PROT SERPL-MCNC: 8.8 G/DL (ref 6–8.4)
PROT UR QL STRIP: NEGATIVE
RBC # BLD AUTO: 4.28 M/UL (ref 4–5.4)
RBC #/AREA URNS AUTO: 7 /HPF (ref 0–4)
RSV AG BY MOLECULAR METHOD: NOT DETECTED
SAMPLE: NORMAL
SARS-COV-2 RNA RESP QL NAA+PROBE: NOT DETECTED
SODIUM BLD-SCNC: 138 MMOL/L (ref 136–145)
SODIUM SERPL-SCNC: 139 MMOL/L (ref 136–145)
SP GR UR STRIP: 1.02 (ref 1–1.03)
SQUAMOUS #/AREA URNS AUTO: 11 /HPF
TROPONIN I SERPL DL<=0.01 NG/ML-MCNC: <0.006 NG/ML (ref 0–0.03)
TSH SERPL DL<=0.005 MIU/L-ACNC: 2.21 UIU/ML (ref 0.4–4)
URN SPEC COLLECT METH UR: ABNORMAL
WBC # BLD AUTO: 5.86 K/UL (ref 3.9–12.7)
WBC #/AREA URNS AUTO: 1 /HPF (ref 0–5)

## 2024-05-31 PROCEDURE — 86140 C-REACTIVE PROTEIN: CPT | Performed by: EMERGENCY MEDICINE

## 2024-05-31 PROCEDURE — 87389 HIV-1 AG W/HIV-1&-2 AB AG IA: CPT | Performed by: PHYSICIAN ASSISTANT

## 2024-05-31 PROCEDURE — 93010 ELECTROCARDIOGRAM REPORT: CPT | Mod: 76,,, | Performed by: INTERNAL MEDICINE

## 2024-05-31 PROCEDURE — 85652 RBC SED RATE AUTOMATED: CPT | Performed by: EMERGENCY MEDICINE

## 2024-05-31 PROCEDURE — 96360 HYDRATION IV INFUSION INIT: CPT

## 2024-05-31 PROCEDURE — 93005 ELECTROCARDIOGRAM TRACING: CPT

## 2024-05-31 PROCEDURE — 0241U SARS-COV2 (COVID) WITH FLU/RSV BY PCR: CPT | Performed by: EMERGENCY MEDICINE

## 2024-05-31 PROCEDURE — 99284 EMERGENCY DEPT VISIT MOD MDM: CPT | Mod: 25

## 2024-05-31 PROCEDURE — 83880 ASSAY OF NATRIURETIC PEPTIDE: CPT | Performed by: EMERGENCY MEDICINE

## 2024-05-31 PROCEDURE — 84443 ASSAY THYROID STIM HORMONE: CPT | Performed by: EMERGENCY MEDICINE

## 2024-05-31 PROCEDURE — 85025 COMPLETE CBC W/AUTO DIFF WBC: CPT | Performed by: EMERGENCY MEDICINE

## 2024-05-31 PROCEDURE — 84484 ASSAY OF TROPONIN QUANT: CPT | Performed by: EMERGENCY MEDICINE

## 2024-05-31 PROCEDURE — 81001 URINALYSIS AUTO W/SCOPE: CPT | Performed by: EMERGENCY MEDICINE

## 2024-05-31 PROCEDURE — 93010 ELECTROCARDIOGRAM REPORT: CPT | Mod: ,,, | Performed by: INTERNAL MEDICINE

## 2024-05-31 PROCEDURE — 80053 COMPREHEN METABOLIC PANEL: CPT | Performed by: EMERGENCY MEDICINE

## 2024-05-31 PROCEDURE — 80047 BASIC METABLC PNL IONIZED CA: CPT

## 2024-05-31 PROCEDURE — 83735 ASSAY OF MAGNESIUM: CPT | Performed by: EMERGENCY MEDICINE

## 2024-05-31 PROCEDURE — 82330 ASSAY OF CALCIUM: CPT

## 2024-05-31 PROCEDURE — 81025 URINE PREGNANCY TEST: CPT | Performed by: EMERGENCY MEDICINE

## 2024-05-31 PROCEDURE — 25000003 PHARM REV CODE 250: Performed by: EMERGENCY MEDICINE

## 2024-05-31 PROCEDURE — 86803 HEPATITIS C AB TEST: CPT | Performed by: PHYSICIAN ASSISTANT

## 2024-05-31 RX ORDER — LEVOTHYROXINE SODIUM 50 UG/1
50 TABLET ORAL
COMMUNITY
End: 2024-06-03 | Stop reason: SDUPTHER

## 2024-05-31 RX ADMIN — SODIUM CHLORIDE 1000 ML: 9 INJECTION, SOLUTION INTRAVENOUS at 01:05

## 2024-05-31 NOTE — ED PROVIDER NOTES
"Encounter Date: 5/31/2024       History     Chief Complaint   Patient presents with    Tachycardia     Tachycardia that has been intermittent x months. Also complaining of cold-like symptoms, fever, and chills, and a cyst on her back having "ruptured".      HPI  49yo F who presents with palpitations. She states this has been going on for months. Had her thyroid checked and medications adjusted. Then saw cardiology and was started on propranolol. No symptoms relief. Seems to have gotten worse over the last few days. Has had some congestion. No fevers or chills. No chest pain but has a strange sensation in L chest at times. tHe palpitations come and go. No triggers. No sob, leg swelling, hx of dvt or pe, recent travel or surgery. No black or bloody stools. Did take an abx for sinus congestion recently and reports some mild diarrhea. Has lowered her caffeine. No drug use      Review of patient's allergies indicates:   Allergen Reactions    Minocin [minocycline] Swelling and Other (See Comments)     And Severe Joint Pain    Soma [carisoprodol] Hives    Bactrim [sulfamethoxazole-trimethoprim] Other (See Comments)     Black/dark color to tongue while taking medication (discoloration only - pt verbally stated that she has taken it before and could take it if needed)    Clarithromycin     Codeine     Sulfadiazine      Past Medical History:   Diagnosis Date    Abnormal Pap smear of cervix 2010    Hpv (Dr Benitez)    Anemia     Anxiety     Depression     Fibromyalgia     History of kidney stones     HPV (human papilloma virus) infection 2010    LEEP Done 5-24-12 (Dr Benitez)    Hx of thyroid nodule 1999    Pelvic pain     PONV (postoperative nausea and vomiting)     Thyroid disease     Hypo    Tobacco use      Past Surgical History:   Procedure Laterality Date    AUGMENTATION OF BREAST      BREAST AUGMENTATION  1994    CERVICAL BIOPSY  W/ LOOP ELECTRODE EXCISION  05/24/2012    Dr Benitez    INJECTION, SACROILIAC JOINT Bilateral " 9/14/2022    Procedure: INJECTION,SACROILIAC JOINT bilateral;  Surgeon: Rica Gonzalez MD;  Location: [facility];  Service: Pain Management;  Laterality: Bilateral;    PELVIC LAPAROSCOPY  2000, 2017    THYROIDECTOMY, PARTIAL  1999    WISDOM TOOTH EXTRACTION  1990     Family History   Problem Relation Name Age of Onset    Prostate cancer Paternal Grandfather      Cancer Paternal Grandfather      Breast cancer Maternal Grandmother      Cancer Maternal Grandmother      Alzheimer's disease Maternal Grandmother      Colon cancer Maternal Grandfather      Cancer Maternal Grandfather      Pancreatic cancer Maternal Grandfather      Diabetes Father      Hypertension Father      Cancer Mother      Brain cancer Mother      Breast cancer Maternal Aunt      Breast cancer Other pat gr aunt         pat gr aunt    Ovarian cancer Neg Hx       Social History     Tobacco Use    Smoking status: Former     Types: Vaping with nicotine     Passive exposure: Current    Smokeless tobacco: Never   Substance Use Topics    Alcohol use: Yes     Comment: Social    Drug use: No     Review of Systems    Physical Exam     Initial Vitals [05/31/24 1122]   BP Pulse Resp Temp SpO2   (!) 141/69 (!) 114 18 98.8 °F (37.1 °C) 100 %      MAP       --         Physical Exam    Nursing note and vitals reviewed.  Constitutional: She appears well-developed and well-nourished. No distress.   HENT:   Head: Normocephalic and atraumatic.   Nose: Nose normal.   Eyes: Conjunctivae and EOM are normal. Pupils are equal, round, and reactive to light.   Neck:   Normal range of motion.  Cardiovascular:  Normal rate, regular rhythm, normal heart sounds and intact distal pulses.     Exam reveals no friction rub.       No murmur heard.  Pulmonary/Chest: Breath sounds normal. No respiratory distress. She has no wheezes. She has no rhonchi. She has no rales.   Abdominal: Abdomen is soft. She exhibits no distension. There is no abdominal tenderness. There is no rebound and  no guarding.   Musculoskeletal:         General: No tenderness or edema. Normal range of motion.      Cervical back: Normal range of motion.     Neurological: She is alert and oriented to person, place, and time. She has normal strength. No sensory deficit.   Skin: Skin is warm and dry. Capillary refill takes less than 2 seconds. No erythema.   Psychiatric: She has a normal mood and affect.         ED Course   Procedures  Labs Reviewed   COMPREHENSIVE METABOLIC PANEL - Abnormal; Notable for the following components:       Result Value    CO2 22 (*)     Total Protein 8.8 (*)     AST 41 (*)     ALT 60 (*)     All other components within normal limits   URINALYSIS, REFLEX TO URINE CULTURE - Abnormal; Notable for the following components:    Leukocytes, UA Trace (*)     All other components within normal limits    Narrative:     Specimen Source->Urine   CBC W/ AUTO DIFFERENTIAL - Abnormal; Notable for the following components:    MPV 9.0 (*)     All other components within normal limits   URINALYSIS MICROSCOPIC - Abnormal; Notable for the following components:    RBC, UA 7 (*)     Bacteria Few (*)     Hyaline Casts, UA 20 (*)     All other components within normal limits    Narrative:     Specimen Source->Urine   SEDIMENTATION RATE   C-REACTIVE PROTEIN   HIV 1 / 2 ANTIBODY    Narrative:     Release to patient->Immediate   HEPATITIS C ANTIBODY    Narrative:     Release to patient->Immediate   TROPONIN I   TSH   MAGNESIUM   B-TYPE NATRIURETIC PEPTIDE   SARS-COV2 (COVID) WITH FLU/RSV BY PCR   POCT URINE PREGNANCY   ISTAT PROCEDURE   ISTAT CHEM8          Imaging Results    None          Medications   sodium chloride 0.9% bolus 1,000 mL 1,000 mL (1,000 mLs Intravenous New Bag 5/31/24 1321)     Medical Decision Making  47yo F who presents with ongoing palptiations. Has seen cardiology. No relief with propranolol. Looks well here. Not severely dehydrated. No bleeding. Low suspicion for electrolyte abnormality, PE, dissection,  pericarditis/myocarditis. Will rule out ilene, electrolyte abnormality, life threatening acs, chf, thyroid disease, covid.  If negative will be stable for discharge home with ongoing outptient followup.    Amount and/or Complexity of Data Reviewed  Labs: ordered.  Radiology: ordered and independent interpretation performed.     Details: Sinus, initial rate 100, then 80 on 2nd EKG. No st elevation or depression. Normal intervals                                      Clinical Impression:  Final diagnoses:  [R00.0] Tachycardia  [R00.2] Palpitations (Primary)          ED Disposition Condition    Discharge Stable          ED Prescriptions    None       Follow-up Information    None          Linda Kincaid MD  05/31/24 1528

## 2024-05-31 NOTE — ED TRIAGE NOTES
"Yanique Mcdermott, a 48 y.o. female presents to the ED w/ complaint of rapid HR, feels fluttering in chest, waking from sleep when occurs.    Triage note:  Chief Complaint   Patient presents with    Tachycardia     Tachycardia that has been intermittent x months. Also complaining of cold-like symptoms, fever, and chills, and a cyst on her back having "ruptured".      Review of patient's allergies indicates:   Allergen Reactions    Minocin [minocycline] Swelling and Other (See Comments)     And Severe Joint Pain    Soma [carisoprodol] Hives    Bactrim [sulfamethoxazole-trimethoprim] Other (See Comments)     Black/dark color to tongue while taking medication (discoloration only - pt verbally stated that she has taken it before and could take it if needed)    Clarithromycin     Codeine     Sulfadiazine      Past Medical History:   Diagnosis Date    Abnormal Pap smear of cervix 2010    Hpv (Dr eBnitez)    Anemia     Anxiety     Depression     Fibromyalgia     History of kidney stones     HPV (human papilloma virus) infection 2010    LEEP Done 5-24-12 (Dr Benitez)    Hx of thyroid nodule 1999    Pelvic pain     PONV (postoperative nausea and vomiting)     Thyroid disease     Hypo    Tobacco use          APPEARANCE: awake and alert in NAD. PAIN  0/10  SKIN: warm, dry and intact. No breakdown or bruising.  MUSCULOSKELETAL: Patient moving all extremities spontaneously, no obvious swelling or deformities noted. Ambulates independently.  RESPIRATORY: Denies shortness of breath.Respirations unlabored.   CARDIAC: Denies CP, 2+ distal pulses; no peripheral edema  ABDOMEN: S/ND/NT, Denies nausea  : voids spontaneously, denies difficulty  Neurologic: AAO x 4; follows commands equal strength in all extremities; denies numbness/tingling. Denies dizziness     "

## 2024-05-31 NOTE — ED NOTES
I-STAT Chem-8+ Results:   Value Reference Range   Sodium 138 136-145 mmol/L   Potassium  4.4 3.5-5.1 mmol/L   Chloride 105  mmol/L   Ionized Calcium 1.20 1.06-1.42 mmol/L   CO2 (measured) 27 23-29 mmol/L   Glucose 96  mg/dL   BUN 15 6-30 mg/dL   Creatinine 1.0 0.5-1.4 mg/dL   Hematocrit 41 36-54%

## 2024-06-03 ENCOUNTER — OFFICE VISIT (OUTPATIENT)
Dept: INTERNAL MEDICINE | Facility: CLINIC | Age: 49
End: 2024-06-03
Payer: MEDICARE

## 2024-06-03 VITALS
OXYGEN SATURATION: 98 % | HEIGHT: 64 IN | SYSTOLIC BLOOD PRESSURE: 112 MMHG | DIASTOLIC BLOOD PRESSURE: 68 MMHG | HEART RATE: 105 BPM | WEIGHT: 153.69 LBS | BODY MASS INDEX: 26.24 KG/M2

## 2024-06-03 DIAGNOSIS — G89.29 CHRONIC BILATERAL LOW BACK PAIN WITHOUT SCIATICA: ICD-10-CM

## 2024-06-03 DIAGNOSIS — F33.41 RECURRENT MAJOR DEPRESSIVE DISORDER, IN PARTIAL REMISSION: ICD-10-CM

## 2024-06-03 DIAGNOSIS — Z23 NEED FOR TDAP VACCINATION: ICD-10-CM

## 2024-06-03 DIAGNOSIS — Z00.00 ROUTINE GENERAL MEDICAL EXAMINATION AT A HEALTH CARE FACILITY: Primary | ICD-10-CM

## 2024-06-03 DIAGNOSIS — R74.8 ELEVATED LIVER ENZYMES: ICD-10-CM

## 2024-06-03 DIAGNOSIS — N20.0 CALCULUS OF KIDNEY: ICD-10-CM

## 2024-06-03 DIAGNOSIS — M46.1 SACROILIITIS: ICD-10-CM

## 2024-06-03 DIAGNOSIS — M54.50 CHRONIC BILATERAL LOW BACK PAIN WITHOUT SCIATICA: ICD-10-CM

## 2024-06-03 DIAGNOSIS — E89.0 POSTOPERATIVE HYPOTHYROIDISM: ICD-10-CM

## 2024-06-03 DIAGNOSIS — Z23 NEED FOR PNEUMOCOCCAL 20-VALENT CONJUGATE VACCINATION: ICD-10-CM

## 2024-06-03 DIAGNOSIS — R79.9 ABNORMAL FINDING OF BLOOD CHEMISTRY, UNSPECIFIED: ICD-10-CM

## 2024-06-03 DIAGNOSIS — Z80.3 FAMILY HISTORY OF BREAST CANCER: ICD-10-CM

## 2024-06-03 DIAGNOSIS — N39.3 STRESS INCONTINENCE: ICD-10-CM

## 2024-06-03 DIAGNOSIS — K76.0 HEPATIC STEATOSIS: ICD-10-CM

## 2024-06-03 DIAGNOSIS — F41.1 GENERALIZED ANXIETY DISORDER: ICD-10-CM

## 2024-06-03 DIAGNOSIS — Z76.89 ESTABLISHING CARE WITH NEW DOCTOR, ENCOUNTER FOR: ICD-10-CM

## 2024-06-03 DIAGNOSIS — Z23 NEED FOR SHINGLES VACCINE: ICD-10-CM

## 2024-06-03 DIAGNOSIS — E55.9 VITAMIN D DEFICIENCY: ICD-10-CM

## 2024-06-03 PROBLEM — Z87.42 HISTORY OF ENDOMETRIOSIS: Status: RESOLVED | Noted: 2018-02-20 | Resolved: 2024-06-03

## 2024-06-03 PROBLEM — E03.9 HYPOTHYROIDISM: Status: ACTIVE | Noted: 2022-09-10

## 2024-06-03 PROBLEM — M62.89 PELVIC FLOOR DYSFUNCTION: Status: RESOLVED | Noted: 2022-09-27 | Resolved: 2024-06-03

## 2024-06-03 PROCEDURE — 1159F MED LIST DOCD IN RCRD: CPT | Mod: CPTII,S$GLB,, | Performed by: INTERNAL MEDICINE

## 2024-06-03 PROCEDURE — 99999 PR PBB SHADOW E&M-EST. PATIENT-LVL III: CPT | Mod: PBBFAC,,, | Performed by: INTERNAL MEDICINE

## 2024-06-03 PROCEDURE — 99204 OFFICE O/P NEW MOD 45 MIN: CPT | Mod: S$GLB,,, | Performed by: INTERNAL MEDICINE

## 2024-06-03 PROCEDURE — 3078F DIAST BP <80 MM HG: CPT | Mod: CPTII,S$GLB,, | Performed by: INTERNAL MEDICINE

## 2024-06-03 PROCEDURE — 3074F SYST BP LT 130 MM HG: CPT | Mod: CPTII,S$GLB,, | Performed by: INTERNAL MEDICINE

## 2024-06-03 PROCEDURE — 3008F BODY MASS INDEX DOCD: CPT | Mod: CPTII,S$GLB,, | Performed by: INTERNAL MEDICINE

## 2024-06-03 RX ORDER — ZOSTER VACCINE RECOMBINANT, ADJUVANTED 50 MCG/0.5
0.5 KIT INTRAMUSCULAR ONCE
Qty: 1 EACH | Refills: 0 | Status: SHIPPED | OUTPATIENT
Start: 2024-08-19 | End: 2024-08-19

## 2024-06-03 RX ORDER — LEVOTHYROXINE SODIUM 50 UG/1
50 TABLET ORAL
Qty: 90 TABLET | Refills: 3 | Status: SHIPPED | OUTPATIENT
Start: 2024-06-03

## 2024-06-03 RX ORDER — PNEUMOCOCCAL 20-VALENT CONJUGATE VACCINE 2.2; 2.2; 2.2; 2.2; 2.2; 2.2; 2.2; 2.2; 2.2; 2.2; 2.2; 2.2; 2.2; 2.2; 2.2; 2.2; 4.4; 2.2; 2.2; 2.2 UG/.5ML; UG/.5ML; UG/.5ML; UG/.5ML; UG/.5ML; UG/.5ML; UG/.5ML; UG/.5ML; UG/.5ML; UG/.5ML; UG/.5ML; UG/.5ML; UG/.5ML; UG/.5ML; UG/.5ML; UG/.5ML; UG/.5ML; UG/.5ML; UG/.5ML; UG/.5ML
0.5 INJECTION, SUSPENSION INTRAMUSCULAR ONCE
Qty: 0.5 ML | Refills: 0 | Status: SHIPPED | OUTPATIENT
Start: 2024-06-03 | End: 2024-06-03

## 2024-06-03 RX ORDER — PROPRANOLOL HYDROCHLORIDE 10 MG/1
10 TABLET ORAL 3 TIMES DAILY PRN
Qty: 90 TABLET | Refills: 11 | Status: SHIPPED | OUTPATIENT
Start: 2024-06-03

## 2024-06-03 RX ORDER — DULOXETIN HYDROCHLORIDE 60 MG/1
60 CAPSULE, DELAYED RELEASE ORAL DAILY
Qty: 90 CAPSULE | Refills: 3 | Status: SHIPPED | OUTPATIENT
Start: 2024-06-03

## 2024-06-03 RX ORDER — ASPIRIN 325 MG
50000 TABLET, DELAYED RELEASE (ENTERIC COATED) ORAL
Qty: 12 CAPSULE | Refills: 3 | Status: SHIPPED | OUTPATIENT
Start: 2024-06-03

## 2024-06-03 RX ORDER — ZOSTER VACCINE RECOMBINANT, ADJUVANTED 50 MCG/0.5
0.5 KIT INTRAMUSCULAR ONCE
Qty: 1 EACH | Refills: 0 | Status: SHIPPED | OUTPATIENT
Start: 2024-06-17 | End: 2024-06-17

## 2024-06-03 NOTE — PROGRESS NOTES
"[facility]  Progress Note   Recent ED Discharge     PRESENTING HISTORY     PCP: No, Primary Doctor (Jose). She wants to establish care here.  Chief Complaint/Reason for Visit:  Follow up ED Discharge     History of Present Illness: Ms. Yanique Mcdermott is a 48 y.o. female who was recently discharged from the ED.    ED Visit: 5-     Tachycardia       Tachycardia that has been intermittent x months. Also complaining of cold-like symptoms, fever, and chills, and a cyst on her back having "ruptured".       HPI  47yo F who presents with palpitations. She states this has been going on for months. Had her thyroid checked and medications adjusted. Then saw cardiology and was started on propranolol. No symptoms relief. Seems to have gotten worse over the last few days. Has had some congestion. No fevers or chills. No chest pain but has a strange sensation in L chest at times. tHe palpitations come and go. No triggers. No sob, leg swelling, hx of dvt or pe, recent travel or surgery. No black or bloody stools. Did take an abx for sinus congestion recently and reports some mild diarrhea. Has lowered her caffeine. No drug use  Medical Decision Making  47yo F who presents with ongoing palptiations. Has seen cardiology. No relief with propranolol. Looks well here. Not severely dehydrated. No bleeding. Low suspicion for electrolyte abnormality, PE, dissection, pericarditis/myocarditis. Will rule out ilene, electrolyte abnormality, life threatening acs, chf, thyroid disease, covid.  If negative will be stable for discharge home with ongoing outptient followup.     Amount and/or Complexity of Data Reviewed  Labs: ordered.  Radiology: ordered and independent interpretation performed.     Details: Sinus, initial rate 100, then 80 on 2nd EKG. No st elevation or depression. Normal intervals    ____________________________________________________________________    Today:    Feeling better.    Reviewed meds and " past history.    PAST HISTORY:     Past Medical History:   Diagnosis Date    Abnormal Pap smear of cervix 2010    Hpv (Dr Benitez)    Calculus of kidney 06/03/2024    Chronic bilateral low back pain without sciatica 09/26/2022    Fibromyalgia     Generalized anxiety disorder 06/03/2024    History of endometriosis 02/20/2018    HPV (human papilloma virus) infection 2010    LEEP Done 5-24-12 (Dr Benitez)    Hx of thyroid nodule 1999    Mononucleosis     Pelvic floor dysfunction 09/27/2022    Postoperative hypothyroidism 09/10/2022    Recurrent major depressive disorder, in partial remission 06/03/2024    Stress incontinence 09/27/2022    Tobacco use        Past Surgical History:   Procedure Laterality Date    BREAST AUGMENTATION  1994    CERVICAL BIOPSY  W/ LOOP ELECTRODE EXCISION  05/24/2012    Dr Benitez    INJECTION, SACROILIAC JOINT Bilateral 09/14/2022    Procedure: INJECTION,SACROILIAC JOINT bilateral;  Surgeon: Rica Gonzalez MD;  Location: [facility];  Service: Pain Management;  Laterality: Bilateral;    PELVIC LAPAROSCOPY  2000, 2017    THYROIDECTOMY, PARTIAL  1999    Herthel cell    WISDOM TOOTH EXTRACTION  1990       Family History   Problem Relation Name Age of Onset    Prostate cancer Paternal Grandfather      Cancer Paternal Grandfather      Breast cancer Maternal Grandmother      Cancer Maternal Grandmother      Alzheimer's disease Maternal Grandmother      Colon cancer Maternal Grandfather      Cancer Maternal Grandfather      Pancreatic cancer Maternal Grandfather      Diabetes Father      Hypertension Father      Cancer Mother      Brain cancer Mother      Breast cancer Maternal Aunt      Breast cancer Other pat gr aunt         pat gr aunt    Ovarian cancer Neg Hx         Social History     Socioeconomic History    Marital status:     Number of children: 2   Tobacco Use    Smoking status: Former     Types: Vaping with nicotine     Passive exposure: Current    Smokeless tobacco: Never   Substance  and Sexual Activity    Alcohol use: Yes     Comment: Social    Drug use: No    Sexual activity: Yes     Partners: Male     Birth control/protection: None     Comment: :  Back with Ex   Social History Narrative    Was a surgical tech.    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        .    2 boys (one with autism)       MEDICATIONS & ALLERGIES:     Current Outpatient Medications on File Prior to Visit   Medication Sig Dispense Refill    progesterone (PROMETRIUM) 100 MG capsule Take 1 capsule (100 mg total) by mouth once daily. 90 capsule 3     DULoxetine (CYMBALTA) 60 MG capsule Take 60 mg by mouth once daily.  0    levothyroxine (SYNTHROID) 50 MCG tablet Take 50 mcg by mouth before breakfast.      [DISCONTINUED] azelastine (ASTELIN) 137 mcg (0.1 %) nasal spray INSTILL 1 SPRAY INTO EACH NOSTRIL TWICE A DAY FOR 30 DAYS (Patient not taking: Reported on 6/3/2024)      [DISCONTINUED] benzonatate (TESSALON PERLES) 100 MG capsule Take 2 capsules (200 mg total) by mouth 3 (three) times daily as needed for Cough. 30 capsule 1    [DISCONTINUED] cholecalciferol, vitamin D3, 1,250 mcg (50,000 unit) capsule Take 50,000 Units by mouth every 7 days. (Patient not taking: Reported on 6/3/2024)      [DISCONTINUED] fluticasone propionate (FLONASE) 50 mcg/actuation nasal spray USE 1 SPRAY IN EACH NOSTRIL ONCE DAILY (Patient not taking: Reported on 6/3/2024) 16 g 2    [DISCONTINUED] loratadine (CLARITIN) 10 mg tablet Take 1 tablet (10 mg total) by mouth once daily. 30 tablet 2    [DISCONTINUED] meloxicam (MOBIC) 15 MG tablet Take 15 mg by mouth once daily. (Patient not taking: Reported on 6/3/2024)      [DISCONTINUED] multivitamin (THERAGRAN) per tablet Take 1 tablet by mouth once daily. (Patient not taking: Reported on 6/3/2024)      [DISCONTINUED] NURTEC 75 mg odt Take 75 mg by mouth daily as needed. (Patient not taking: Reported on 6/3/2024)      [DISCONTINUED] ondansetron (ZOFRAN-ODT) 4 MG TbDL DISSOLVE 1 TABLET IN MOUTH EVERY 8 HOURS  FOR 3 DAYS (Patient not taking: Reported on 6/3/2024)  0     No current facility-administered medications on file prior to visit.        Review of patient's allergies indicates:   Allergen Reactions    Minocin [minocycline] Swelling and Other (See Comments)     And Severe Joint Pain  Able to take doxycycline.    Soma [carisoprodol] Hives    Bactrim [sulfamethoxazole-trimethoprim] Other (See Comments)     Black/dark color to tongue while taking medication (discoloration only - pt verbally stated that she has taken it before and could take it if needed)    Codeine Other (See Comments)     itching       Medications Reconciliation:   I have reconciled the patient's home medications and discharge medications with the patient/family. I have updated all changes.  Refer to After-Visit Medication List.    OBJECTIVE:     Vital Signs:  Vitals:    06/03/24 1040   BP: 112/68   Pulse: 105     Wt Readings from Last 3 Encounters:   06/03/24 1040 69.7 kg (153 lb 10.6 oz)   05/31/24 1122 69.9 kg (154 lb)   04/02/24 1015 69.8 kg (153 lb 15.9 oz)     Body mass index is 26.38 kg/m².     Physical Exam:  General: Well developed, well nourished. No distress.  HEENT: Head is normocephalic, atraumatic;   Eyes: Clear conjunctiva.  Neck: Supple, symmetrical neck; trachea midline.  Lungs: Clear to auscultation bilaterally and normal respiratory effort.  Cardiovascular: Heart with regular rate and rhythm.    Extremities: No LE edema. Pulses 2+ and symmetric.   Abdomen: Abdomen is soft, non-tender non-distended with normal bowel sounds.  Skin: Skin color, texture, turgor normal. No rashes.  Musculoskeletal: Normal gait.   Lymph Nodes: No cervical or supraclavicular adenopathy.  Neurologic: Normal strength and tone. No focal numbness or weakness.   Psychiatric: Normal affect. Alert.    Laboratory  Lab Results   Component Value Date    WBC 5.86 05/31/2024    HGB 12.9 05/31/2024    HCT 39.8 05/31/2024    MCV 93 05/31/2024     05/31/2024      BMP  Lab Results   Component Value Date     05/31/2024    K 4.6 05/31/2024     05/31/2024    CO2 22 (L) 05/31/2024    BUN 13 05/31/2024    CREATININE 1.0 05/31/2024    CALCIUM 10.2 05/31/2024    ANIONGAP 12 05/31/2024    EGFRNORACEVR >60.0 05/31/2024     Lab Results   Component Value Date    ALT 60 (H) 05/31/2024    AST 41 (H) 05/31/2024    ALKPHOS 87 05/31/2024    BILITOT 0.3 05/31/2024      Latest Reference Range & Units 05/31/24 12:45   BNP 0 - 99 pg/mL <10   Troponin I 0.000 - 0.026 ng/mL <0.006   TSH 0.400 - 4.000 uIU/mL 2.213     5-: ECG - NSR      ASSESSMENT & PLAN:   New patient who has not seen Primary Care Provider at [facility] for the past 3 years.  Patient is new to me. Medical, surgical, social, medication and allergy histories were obtained during this visit.    Routine general medical examination at a health care facility  Establishing care with new doctor, encounter for       -     Vitamin D; Future; Expected date: 11/30/2024      -     HEMOGLOBIN A1C; Future; Expected date: 06/03/2024  -     Lipid Panel; Future; Expected date: 06/03/2024    Recurrent major depressive disorder, in partial remission  - Refilled:  -     DULoxetine (CYMBALTA) 60 MG capsule; Take 1 capsule (60 mg total) by mouth once daily.  Dispense: 90 capsule; Refill: 3    Generalized anxiety disorder  - Refilled:  -     propranoloL (INDERAL) 10 MG tablet; Take 1 tablet (10 mg total) by mouth 3 (three) times daily as needed (anxiety, palpitation).  Dispense: 90 tablet; Refill: 11    Postoperative hypothyroidism  -     levothyroxine (SYNTHROID) 50 MCG tablet; Take 1 tablet (50 mcg total) by mouth before breakfast.  Dispense: 90 tablet; Refill: 3    Vitamin D deficiency  -     cholecalciferol, vitamin D3, 1,250 mcg (50,000 unit) capsule; Take 1 capsule (50,000 Units total) by mouth every 7 days.  Dispense: 12 capsule; Refill: 3    Sacroiliitis  Chronic bilateral low back pain without sciatica  - [facility]ed by [facility].    Calculus of kidney  - X 2 in the past.    Stress incontinence  Pelvic Floor Dysfunction  - Followed by GYN. On progesterone 100 mg daily.    Family history of breast cancer    Hepatic Steatosis  -     US Abdomen 6/4/24  Rx: Omega-3 fish oil daily and Vitamin E 400 IU daily     Preventive Health Maintenance:     Need for pneumococcal 20-valent conjugate vaccination  -     pneumoc 20-jose conj-dip cr,PF, (PREVNAR 20, PF,) 0.5 mL Syrg injection; Inject 0.5 mLs into the muscle once. for 1 dose  Dispense: 0.5 mL; Refill: 0    Need for Tdap vaccination  -     diphth,pertus,acell,,tetanus (BOOSTRIX) 2.5-8-5 Lf-mcg-Lf/0.5mL Susp; Inject 0.5 mLs into the muscle once. for 1 dose  Dispense: 0.5 mL; Refill: 0    Need for shingles vaccine  -     varicella-zoster gE-AS01B, PF, (SHINGRIX, PF,) 50 mcg/0.5 mL injection; Inject 0.5 mLs into the muscle once. for 1 dose  Dispense: 1 each; Refill: 0  -     varicella-zoster gE-AS01B, PF, (SHINGRIX, PF,) 50 mcg/0.5 mL injection; Inject 0.5 mLs into the muscle once. for 1 dose  Dispense: 1 each; Refill: 0     Return to Clinic for Follow Up with me:  1 Year      Scheduled Follow-up :  Future Appointments   Date Time Provider Department Center   6/3/2024  4:00 PM Nik Carmona MD [facility]   6/4/2024  8:00 AM [facility] OIC-US1 MASTER [facility] ULTR [facility]   6/4/2024  8:50 AM LAB, APPOINTMENT [facility] INTMED [facility] LAB IM [facility] PCW   6/12/2024  3:20 PM Elliott Fam MD [id number]   8/14/2024  8:45 AM Festus Benitez MD [facility] [place]       After Visit Medication List :     Medication List            Accurate as of Ann 3, 2024 11:16 AM. If you have any questions, ask your nurse or doctor.                START taking these medications      diphth,pertus(acell),tetanus 2.5-8-5 Lf-mcg-Lf/0.5mL Susp  Commonly known as: BOOSTRIX  Inject 0.5 mLs into the muscle once. for 1 dose  Started by: Nik Carmona MD     PREVNAR 20 (PF) 0.5 mL Syrg injection  Generic  drug: pneumoc 20-jose conj-dip cr(PF)  Inject 0.5 mLs into the muscle once. for 1 dose  Started by: Nik Carmona MD     propranoloL 10 MG tablet  Commonly known as: INDERAL  Take 1 tablet (10 mg total) by mouth 3 (three) times daily as needed (anxiety, palpitation).  Started by: Nik Carmona MD     * SHINGRIX (PF) 50 mcg/0.5 mL injection  Generic drug: varicella-zoster gE-AS01B (PF)  Inject 0.5 mLs into the muscle once. for 1 dose  Start taking on: June 17, 2024  Started by: Nik Carmona MD     * SHINGRIX (PF) 50 mcg/0.5 mL injection  Generic drug: varicella-zoster gE-AS01B (PF)  Inject 0.5 mLs into the muscle once. for 1 dose  Start taking on: August 19, 2024  Started by: Nik Carmona MD           * This list has 2 medication(s) that are the same as other medications prescribed for you. Read the directions carefully, and ask your doctor or other care provider to review them with you.                CONTINUE taking these medications      cholecalciferol (vitamin D3) 1,250 mcg (50,000 unit) capsule  Take 1 capsule (50,000 Units total) by mouth every 7 days.     DULoxetine 60 MG capsule  Commonly known as: CYMBALTA  Take 1 capsule (60 mg total) by mouth once daily.     levothyroxine 50 MCG tablet  Commonly known as: SYNTHROID  Take 1 tablet (50 mcg total) by mouth before breakfast.     progesterone 100 MG capsule  Commonly known as: PROMETRIUM  Take 1 capsule (100 mg total) by mouth once daily.            STOP taking these medications      azelastine 137 mcg (0.1 %) nasal spray  Commonly known as: ASTELIN  Stopped by: Nik Carmona MD     benzonatate 100 MG capsule  Commonly known as: TESSALON PERLES  Stopped by: Nik Carmona MD     fluticasone propionate 50 mcg/actuation nasal spray  Commonly known as: FLONASE  Stopped by: Nik Carmona MD     loratadine 10 mg tablet  Commonly known as: CLARITIN  Stopped by: Nik Carmona MD     meloxicam 15 MG tablet  Commonly known as: MOBIC  Stopped by: Nik Carmona MD      multivitamin per tablet  Commonly known as: THERAGRAN  Stopped by: Nik Carmona MD     NURTEC 75 mg odt  Generic drug: rimegepant  Stopped by: Nik Carmona MD     ondansetron 4 MG Tbdl  Commonly known as: ZOFRAN-ODT  Stopped by: Nik Carmona MD               Where to Get Your Medications        These medications were sent to [facility]/pharmacy #[id number] - [place] - [address]  [address] [place] [address]      Phone: [phone]   cholecalciferol (vitamin D3) 1,250 mcg (50,000 unit) capsule  diphth,pertus(acell),tetanus 2.5-8-5 Lf-mcg-Lf/0.5mL Susp  DULoxetine 60 MG capsule  levothyroxine 50 MCG tablet  PREVNAR 20 (PF) 0.5 mL Syrg injection  propranoloL 10 MG tablet  SHINGRIX (PF) 50 mcg/0.5 mL injection  SHINGRIX (PF) 50 mcg/0.5 mL injection         Signing Physician:  Nik Carmona MD

## 2024-06-03 NOTE — PROGRESS NOTES
"[facility]  Progress Note   Recent ED Discharge     PRESENTING HISTORY     PCP: No, Primary Doctor  Chief Complaint/Reason for Visit:  Follow up ED Discharge     History of Present Illness: Ms. Yanique Mcdermott is a 48 y.o. female who was recently discharged from the ED.    ED Visit: 5-     Tachycardia       Tachycardia that has been intermittent x months. Also complaining of cold-like symptoms, fever, and chills, and a cyst on her back having "ruptured".       HPI  49yo F who presents with palpitations. She states this has been going on for months. Had her thyroid checked and medications adjusted. Then saw cardiology and was started on propranolol. No symptoms relief. Seems to have gotten worse over the last few days. Has had some congestion. No fevers or chills. No chest pain but has a strange sensation in L chest at times. tHe palpitations come and go. No triggers. No sob, leg swelling, hx of dvt or pe, recent travel or surgery. No black or bloody stools. Did take an abx for sinus congestion recently and reports some mild diarrhea. Has lowered her caffeine. No drug use  Medical Decision Making  49yo F who presents with ongoing palptiations. Has seen cardiology. No relief with propranolol. Looks well here. Not severely dehydrated. No bleeding. Low suspicion for electrolyte abnormality, PE, dissection, pericarditis/myocarditis. Will rule out ilene, electrolyte abnormality, life threatening acs, chf, thyroid disease, covid.  If negative will be stable for discharge home with ongoing outptient followup.     Amount and/or Complexity of Data Reviewed  Labs: ordered.  Radiology: ordered and independent interpretation performed.     Details: Sinus, initial rate 100, then 80 on 2nd EKG. No st elevation or depression. Normal intervals    ____________________________________________________________________    Today:      Review of Systems:  {ROS:[id number]}    PAST HISTORY:     Past Medical History: "   Diagnosis Date    Abnormal Pap smear of cervix 2010    Hpv (Dr Benitez)    Anemia     Anxiety     Depression     Fibromyalgia     History of kidney stones     HPV (human papilloma virus) infection 2010    LEEP Done 5-24-12 (Dr Benitez)    Hx of thyroid nodule 1999    Pelvic pain     PONV (postoperative nausea and vomiting)     Thyroid disease     Hypo    Tobacco use        Past Surgical History:   Procedure Laterality Date    AUGMENTATION OF BREAST      BREAST AUGMENTATION  1994    CERVICAL BIOPSY  W/ LOOP ELECTRODE EXCISION  05/24/2012    Dr Benitez    INJECTION, SACROILIAC JOINT Bilateral 9/14/2022    Procedure: INJECTION,SACROILIAC JOINT bilateral;  Surgeon: Rica Gonzalez MD;  Location: [facility];  Service: Pain Management;  Laterality: Bilateral;    PELVIC LAPAROSCOPY  2000, 2017    THYROIDECTOMY, PARTIAL  1999    WISDOM TOOTH EXTRACTION  1990       Family History   Problem Relation Name Age of Onset    Prostate cancer Paternal Grandfather      Cancer Paternal Grandfather      Breast cancer Maternal Grandmother      Cancer Maternal Grandmother      Alzheimer's disease Maternal Grandmother      Colon cancer Maternal Grandfather      Cancer Maternal Grandfather      Pancreatic cancer Maternal Grandfather      Diabetes Father      Hypertension Father      Cancer Mother      Brain cancer Mother      Breast cancer Maternal Aunt      Breast cancer Other pat gr aunt         pat gr aunt    Ovarian cancer Neg Hx         Social History     Socioeconomic History    Marital status:    Tobacco Use    Smoking status: Former     Types: Vaping with nicotine     Passive exposure: Current    Smokeless tobacco: Never   Substance and Sexual Activity    Alcohol use: Yes     Comment: Social    Drug use: No    Sexual activity: Yes     Partners: Male     Birth control/protection: None     Comment: :  Back with Ex       MEDICATIONS & ALLERGIES:     Current Outpatient Medications on File Prior to Visit   Medication Sig  Dispense Refill    azelastine (ASTELIN) 137 mcg (0.1 %) nasal spray INSTILL 1 SPRAY INTO EACH NOSTRIL TWICE A DAY FOR 30 DAYS      benzonatate (TESSALON PERLES) 100 MG capsule Take 2 capsules (200 mg total) by mouth 3 (three) times daily as needed for Cough. 30 capsule 1    cholecalciferol, vitamin D3, 1,250 mcg (50,000 unit) capsule Take 50,000 Units by mouth every 7 days.      DULoxetine (CYMBALTA) 60 MG capsule Take 60 mg by mouth once daily.  0    fluticasone propionate (FLONASE) 50 mcg/actuation nasal spray USE 1 SPRAY IN EACH NOSTRIL ONCE DAILY 16 g 2    levothyroxine (SYNTHROID) 50 MCG tablet Take 50 mcg by mouth before breakfast.      loratadine (CLARITIN) 10 mg tablet Take 1 tablet (10 mg total) by mouth once daily. 30 tablet 2    meloxicam (MOBIC) 15 MG tablet Take 15 mg by mouth once daily.      multivitamin (THERAGRAN) per tablet Take 1 tablet by mouth once daily.      NURTEC 75 mg odt Take 75 mg by mouth daily as needed.      ondansetron (ZOFRAN-ODT) 4 MG TbDL DISSOLVE 1 TABLET IN MOUTH EVERY 8 HOURS FOR 3 DAYS  0    progesterone (PROMETRIUM) 100 MG capsule Take 1 capsule (100 mg total) by mouth once daily. 90 capsule 3     No current facility-administered medications on file prior to visit.        Review of patient's allergies indicates:   Allergen Reactions    Minocin [minocycline] Swelling and Other (See Comments)     And Severe Joint Pain    Soma [carisoprodol] Hives    Bactrim [sulfamethoxazole-trimethoprim] Other (See Comments)     Black/dark color to tongue while taking medication (discoloration only - pt verbally stated that she has taken it before and could take it if needed)    Clarithromycin     Codeine     Sulfadiazine        Medications Reconciliation:   I have reconciled the patient's home medications and discharge medications with the patient/family. I have updated all changes.  Refer to After-Visit Medication List.    OBJECTIVE:     Vital Signs:  There were no vitals filed for this  "visit.  Wt Readings from Last 3 Encounters:   05/31/24 1122 69.9 kg (154 lb)   04/02/24 1015 69.8 kg (153 lb 15.9 oz)   11/30/23 0807 66.7 kg (147 lb)     There is no height or weight on file to calculate BMI.     Physical Exam:  {Exam:[id number]}    Laboratory  Lab Results   Component Value Date    WBC 5.86 05/31/2024    HGB 12.9 05/31/2024    HCT 39.8 05/31/2024    MCV 93 05/31/2024     05/31/2024     BMP  Lab Results   Component Value Date     05/31/2024    K 4.6 05/31/2024     05/31/2024    CO2 22 (L) 05/31/2024    BUN 13 05/31/2024    CREATININE 1.0 05/31/2024    CALCIUM 10.2 05/31/2024    ANIONGAP 12 05/31/2024    EGFRNORACEVR >60.0 05/31/2024     Lab Results   Component Value Date    ALT 60 (H) 05/31/2024    AST 41 (H) 05/31/2024    ALKPHOS 87 05/31/2024    BILITOT 0.3 05/31/2024     No results found for: "INR", "PROTIME"  No results found for: "HGBA1C"  No results for input(s): "POCTGLUCOSE" in the last 72 hours.    Diagnostic Results:  ***      ASSESSMENT & PLAN:     HIGH RISK CONDITION(S):  {HIGH RISK CONDITIONS:20815}    There are no diagnoses linked to this encounter.    Instructions for the patient:      Scheduled Follow-up :  Future Appointments   Date Time Provider Department Center   6/3/2024 10:00 AM Nik Carmona MD [facility] [address] [facility]   6/12/2024  3:20 PM Elliott Fam MD [id number]   8/14/2024  8:45 AM Festus Benitez MD [facility] [place]       After Visit Medication List :     Medication List            Accurate as of Ann 3, 2024  9:13 AM. If you have any questions, ask your nurse or doctor.                CONTINUE taking these medications      azelastine 137 mcg (0.1 %) nasal spray  Commonly known as: ASTELIN     benzonatate 100 MG capsule  Commonly known as: TESSALON PERLES  Take 2 capsules (200 mg total) by mouth 3 (three) times daily as needed for Cough.     cholecalciferol (vitamin D3) 1,250 mcg (50,000 unit) capsule     DULoxetine 60 MG " capsule  Commonly known as: CYMBALTA     fluticasone propionate 50 mcg/actuation nasal spray  Commonly known as: FLONASE  USE 1 SPRAY IN EACH NOSTRIL ONCE DAILY     levothyroxine 50 MCG tablet  Commonly known as: SYNTHROID     loratadine 10 mg tablet  Commonly known as: CLARITIN  Take 1 tablet (10 mg total) by mouth once daily.     meloxicam 15 MG tablet  Commonly known as: MOBIC     multivitamin per tablet  Commonly known as: THERAGRAN     NURTEC 75 mg odt  Generic drug: rimegepant     ondansetron 4 MG Tbdl  Commonly known as: ZOFRAN-ODT     progesterone 100 MG capsule  Commonly known as: PROMETRIUM  Take 1 capsule (100 mg total) by mouth once daily.              Signing Physician:  Nik Carmona MD

## 2024-06-04 ENCOUNTER — HOSPITAL ENCOUNTER (OUTPATIENT)
Dept: RADIOLOGY | Facility: HOSPITAL | Age: 49
Discharge: HOME | End: 2024-06-04
Attending: INTERNAL MEDICINE
Payer: MEDICARE

## 2024-06-04 ENCOUNTER — PATIENT MESSAGE (OUTPATIENT)
Dept: INTERNAL MEDICINE | Facility: CLINIC | Age: 49
End: 2024-06-04
Payer: MEDICARE

## 2024-06-04 DIAGNOSIS — R74.8 ELEVATED LIVER ENZYMES: ICD-10-CM

## 2024-06-04 PROBLEM — K76.0 HEPATIC STEATOSIS: Status: ACTIVE | Noted: 2024-06-04

## 2024-06-04 PROCEDURE — 76705 ECHO EXAM OF ABDOMEN: CPT | Mod: TC

## 2024-06-04 PROCEDURE — 76705 ECHO EXAM OF ABDOMEN: CPT | Mod: 26,,, | Performed by: RADIOLOGY

## 2024-06-04 RX ORDER — VITAMIN E 268 MG
400 CAPSULE ORAL DAILY
COMMUNITY
Start: 2024-06-04

## 2024-06-04 RX ORDER — NAPROXEN SODIUM 220 MG/1
1 TABLET ORAL DAILY
COMMUNITY
Start: 2024-06-04

## 2024-06-12 ENCOUNTER — OFFICE VISIT (OUTPATIENT)
Dept: CARDIOLOGY | Facility: CLINIC | Age: 49
End: 2024-06-12
Payer: MEDICARE

## 2024-06-12 VITALS
SYSTOLIC BLOOD PRESSURE: 122 MMHG | OXYGEN SATURATION: 99 % | HEART RATE: 112 BPM | DIASTOLIC BLOOD PRESSURE: 84 MMHG | WEIGHT: 154.69 LBS | BODY MASS INDEX: 26.55 KG/M2

## 2024-06-12 DIAGNOSIS — R00.2 PALPITATIONS: ICD-10-CM

## 2024-06-12 PROCEDURE — 3079F DIAST BP 80-89 MM HG: CPT | Mod: CPTII,S$GLB,, | Performed by: INTERNAL MEDICINE

## 2024-06-12 PROCEDURE — 99999 PR PBB SHADOW E&M-EST. PATIENT-LVL III: CPT | Mod: PBBFAC,,, | Performed by: INTERNAL MEDICINE

## 2024-06-12 PROCEDURE — 3008F BODY MASS INDEX DOCD: CPT | Mod: CPTII,S$GLB,, | Performed by: INTERNAL MEDICINE

## 2024-06-12 PROCEDURE — 1159F MED LIST DOCD IN RCRD: CPT | Mod: CPTII,S$GLB,, | Performed by: INTERNAL MEDICINE

## 2024-06-12 PROCEDURE — 3074F SYST BP LT 130 MM HG: CPT | Mod: CPTII,S$GLB,, | Performed by: INTERNAL MEDICINE

## 2024-06-12 PROCEDURE — 99204 OFFICE O/P NEW MOD 45 MIN: CPT | Mod: S$GLB,,, | Performed by: INTERNAL MEDICINE

## 2024-06-12 RX ORDER — METOPROLOL SUCCINATE 25 MG/1
25 TABLET, EXTENDED RELEASE ORAL DAILY
Qty: 90 TABLET | Refills: 3 | Status: SHIPPED | OUTPATIENT
Start: 2024-06-12 | End: 2025-06-12

## 2024-06-12 NOTE — PROGRESS NOTES
[facility]    Chief Complaint  Chief Complaint   Patient presents with    Palpitations       HPI:    Presents to be evaluated for palpitations.  Have been occurring for several months.  When it initially started, she was just noticing her heart rate to be high when she was lying in bed.  No associated symptoms.  Now notes it often on throughout the day, just about every day.  Wearing an Apple watch.  Heart rate is frequently up to 120.  Rarely below 90.  No prior cardiac problems or workup.    Medications  Current Outpatient Medications   Medication Sig Dispense Refill    cholecalciferol, vitamin D3, 1,250 mcg (50,000 unit) capsule Take 1 capsule (50,000 Units total) by mouth every 7 days. 12 capsule 3    DULoxetine (CYMBALTA) 60 MG capsule Take 1 capsule (60 mg total) by mouth once daily. 90 capsule 3    levothyroxine (SYNTHROID) 50 MCG tablet Take 1 tablet (50 mcg total) by mouth before breakfast. 90 tablet 3    omega 3-dha-epa-fish oil 1,200 (144-216) mg Cap Take 1 capsule by mouth once daily.      progesterone (PROMETRIUM) 100 MG capsule Take 1 capsule (100 mg total) by mouth once daily. 90 capsule 3    propranoloL (INDERAL) 10 MG tablet Take 1 tablet (10 mg total) by mouth 3 (three) times daily as needed (anxiety, palpitation). 90 tablet 11    vitamin E 400 UNIT capsule Take 1 capsule (400 Units total) by mouth once daily.      metoprolol succinate (TOPROL-XL) 25 MG 24 hr tablet Take 1 tablet (25 mg total) by mouth once daily. 90 tablet 3    [START ON 6/17/2024] varicella-zoster gE-AS01B, PF, (SHINGRIX, PF,) 50 mcg/0.5 mL injection Inject 0.5 mLs into the muscle once. for 1 dose (Patient not taking: Reported on 6/12/2024) 1 each 0    [START ON 8/19/2024] varicella-zoster gE-AS01B, PF, (SHINGRIX, PF,) 50 mcg/0.5 mL injection Inject 0.5 mLs into the muscle once. for 1 dose (Patient not taking: Reported on 6/12/2024) 1 each 0     No current facility-administered medications for this visit.         History  Past Medical History:   Diagnosis Date    Abnormal Pap smear of cervix 2010    Hpv (Dr Benitez)    Calculus of kidney 06/03/2024    Chronic bilateral low back pain without sciatica 09/26/2022    Fibromyalgia     Generalized anxiety disorder 06/03/2024    Hepatic steatosis 06/04/2024    History of endometriosis 02/20/2018    HPV (human papilloma virus) infection 2010    LEEP Done 5-24-12 (Dr Benitez)    Hx of thyroid nodule 1999    Mononucleosis     Pelvic floor dysfunction 09/27/2022    Postoperative hypothyroidism 09/10/2022    Recurrent major depressive disorder, in partial remission 06/03/2024    Stress incontinence 09/27/2022    Tobacco use      Past Surgical History:   Procedure Laterality Date    BREAST AUGMENTATION  1994    CERVICAL BIOPSY  W/ LOOP ELECTRODE EXCISION  05/24/2012    Dr Benitez    INJECTION, SACROILIAC JOINT Bilateral 09/14/2022    Procedure: INJECTION,SACROILIAC JOINT bilateral;  Surgeon: Rica Gonzalez MD;  Location: [facility];  Service: Pain Management;  Laterality: Bilateral;    PELVIC LAPAROSCOPY  2000, 2017    THYROIDECTOMY, PARTIAL  1999    Herthel cell    WISDOM TOOTH EXTRACTION  1990     Social History     Socioeconomic History    Marital status: Single    Number of children: 2   Tobacco Use    Smoking status: Former     Types: Vaping with nicotine     Passive exposure: Current    Smokeless tobacco: Never   Substance and Sexual Activity    Alcohol use: Yes     Comment: Social    Drug use: No    Sexual activity: Yes     Partners: Male     Birth control/protection: None     Comment: :  Back with Ex   Social History Narrative    Was a surgical tech.    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        .    2 boys (one with autism)     Social Determinants of Health     Financial Resource Strain: Medium Risk (6/12/2024)    Overall Financial Resource Strain (CARDIA)     Difficulty of Paying Living Expenses: Somewhat hard   Food Insecurity: Food Insecurity Present (6/12/2024)    Hunger  Vital Sign     Worried About Running Out of Food in the Last Year: Patient declined     Ran Out of Food in the Last Year: Sometimes true   Physical Activity: Insufficiently Active (6/12/2024)    Exercise Vital Sign     Days of Exercise per Week: 2 days     Minutes of Exercise per Session: 20 min   Stress: Stress Concern Present (6/12/2024)    [employer] - Occupational Stress Questionnaire     Feeling of Stress : Very much   Housing Stability: Unknown (6/12/2024)    Housing Stability Vital Sign     Unable to Pay for Housing in the Last Year: Patient declined     Family History   Problem Relation Name Age of Onset    Prostate cancer Paternal Grandfather      Cancer Paternal Grandfather      Breast cancer Maternal Grandmother      Cancer Maternal Grandmother      Alzheimer's disease Maternal Grandmother      Colon cancer Maternal Grandfather      Cancer Maternal Grandfather      Pancreatic cancer Maternal Grandfather      Diabetes Father      Hypertension Father      Cancer Mother      Brain cancer Mother      Breast cancer Maternal Aunt      Breast cancer Other pat gr aunt         pat gr aunt    Ovarian cancer Neg Hx          Allergies  Review of patient's allergies indicates:   Allergen Reactions    Minocin [minocycline] Swelling and Other (See Comments)     And Severe Joint Pain  Able to take doxycycline.    Soma [carisoprodol] Hives    Bactrim [sulfamethoxazole-trimethoprim] Other (See Comments)     Black/dark color to tongue while taking medication (discoloration only - pt verbally stated that she has taken it before and could take it if needed)    Codeine Other (See Comments)     itching       Review of Systems   Review of Systems   Constitutional: Negative for malaise/fatigue, weight gain and weight loss.   Eyes:  Negative for visual disturbance.   Cardiovascular:  Positive for palpitations. Negative for chest pain, claudication, cyanosis, dyspnea on exertion, irregular heartbeat,  leg swelling, near-syncope, orthopnea, paroxysmal nocturnal dyspnea and syncope.   Respiratory:  Negative for cough, hemoptysis, shortness of breath, sleep disturbances due to breathing and wheezing.    Hematologic/Lymphatic: Negative for bleeding problem. Does not bruise/bleed easily.   Skin:  Negative for poor wound healing.   Musculoskeletal:  Negative for muscle cramps and myalgias.   Gastrointestinal:  Negative for abdominal pain, anorexia, diarrhea, heartburn, hematemesis, hematochezia, melena, nausea and vomiting.   Genitourinary:  Negative for hematuria and nocturia.   Neurological:  Negative for excessive daytime sleepiness, dizziness, focal weakness, light-headedness and weakness.       Physical Exam  Vitals:    06/12/24 1528   BP: 122/84   Pulse: (!) 112     Wt Readings from Last 1 Encounters:   06/12/24 70.2 kg (154 lb 11.2 oz)     Physical Exam  Vitals and nursing note reviewed.   Constitutional:       General: She is not in acute distress.     Appearance: She is not toxic-appearing or diaphoretic.   HENT:      Head: Normocephalic and atraumatic.      Mouth/Throat:      Lips: Pink.      Mouth: Mucous membranes are moist.   Eyes:      General: No scleral icterus.     Conjunctiva/sclera: Conjunctivae normal.   Neck:      Thyroid: No thyromegaly.      Vascular: No carotid bruit, hepatojugular reflux or JVD.      Trachea: Trachea normal.   Cardiovascular:      Rate and Rhythm: Regular rhythm. Tachycardia present. No extrasystoles are present.     Chest Wall: PMI is not displaced.      Pulses:           Carotid pulses are 2+ on the right side and 2+ on the left side.       Radial pulses are 2+ on the right side and 2+ on the left side.        Dorsalis pedis pulses are 2+ on the right side and 2+ on the left side.        Posterior tibial pulses are 2+ on the right side and 2+ on the left side.      Heart sounds: S1 normal and S2 normal. No murmur heard.     No friction rub. No S3 or S4 sounds.   Pulmonary:       Effort: Pulmonary effort is normal. No accessory muscle usage or respiratory distress.      Breath sounds: Normal breath sounds and air entry. No decreased breath sounds, wheezing, rhonchi or rales.   Abdominal:      General: Bowel sounds are normal. There is no distension or abdominal bruit.      Palpations: Abdomen is soft. There is no hepatomegaly, splenomegaly or pulsatile mass.      Tenderness: There is no abdominal tenderness.   Musculoskeletal:         General: No tenderness or deformity.      Right lower leg: No edema.      Left lower leg: No edema.   Skin:     General: Skin is warm and dry.      Capillary Refill: Capillary refill takes less than 2 seconds.      Coloration: Skin is not cyanotic or pale.      Nails: There is no clubbing.   Neurological:      General: No focal deficit present.      Mental Status: She is alert and oriented to person, place, and time.   Psychiatric:         Attention and Perception: Attention normal.         Mood and Affect: Mood normal.         Speech: Speech normal.         Behavior: Behavior normal. Behavior is cooperative.         Labs  Admission on 05/31/2024, Discharged on 05/31/2024   Component Date Value Ref Range Status    QRS Duration 05/31/2024 76  ms Final    [facility] QTC Calculation 05/31/2024 411  ms Final    Sodium 05/31/2024 139  136 - 145 mmol/L Final    Potassium 05/31/2024 4.6  3.5 - 5.1 mmol/L Final    Chloride 05/31/2024 105  95 - 110 mmol/L Final    CO2 05/31/2024 22 (L)  23 - 29 mmol/L Final    Glucose 05/31/2024 85  70 - 110 mg/dL Final    BUN 05/31/2024 13  6 - 20 mg/dL Final    Creatinine 05/31/2024 1.0  0.5 - 1.4 mg/dL Final    Calcium 05/31/2024 10.2  8.7 - 10.5 mg/dL Final    Total Protein 05/31/2024 8.8 (H)  6.0 - 8.4 g/dL Final    Albumin 05/31/2024 4.0  3.5 - 5.2 g/dL Final    Total Bilirubin 05/31/2024 0.3  0.1 - 1.0 mg/dL Final    Comment: For infants and newborns, interpretation of results should be based  on gestational age, weight and in  agreement with clinical  observations.    Premature Infant recommended reference ranges:  Up to 24 hours.............<8.0 mg/dL  Up to 48 hours............<12.0 mg/dL  3-5 days..................<15.0 mg/dL  6-29 days.................<15.0 mg/dL      Alkaline Phosphatase 05/31/2024 87  55 - 135 U/L Final    AST 05/31/2024 41 (H)  10 - 40 U/L Final    ALT 05/31/2024 60 (H)  10 - 44 U/L Final    eGFR 05/31/2024 >60.0  >60 mL/min/1.73 m^2 Final    Anion Gap 05/31/2024 12  8 - 16 mmol/L Final    QRS Duration 05/31/2024 72  ms Final    [facility] QTC Calculation 05/31/2024 415  ms Final    Sed Rate 05/31/2024 29  0 - 36 mm/Hr Final    CRP 05/31/2024 5.9  0.0 - 8.2 mg/L Final    Specimen UA 05/31/2024 Urine, Clean Catch   Final    Color, UA 05/31/2024 Yellow  Yellow, Straw, Ashly Final    Appearance, UA 05/31/2024 Clear  Clear Final    pH, UA 05/31/2024 6.0  5.0 - 8.0 Final    Specific Gravity, UA 05/31/2024 1.025  1.005 - 1.030 Final    Protein, UA 05/31/2024 Negative  Negative Final    Comment: Recommend a 24 hour urine protein or a urine   protein/creatinine ratio if globulin induced proteinuria is  clinically suspected.      Glucose, UA 05/31/2024 Negative  Negative Final    Ketones, UA 05/31/2024 Negative  Negative Final    Bilirubin (UA) 05/31/2024 Negative  Negative Final    Occult Blood UA 05/31/2024 Negative  Negative Final    Nitrite, UA 05/31/2024 Negative  Negative Final    Leukocytes, UA 05/31/2024 Trace (A)  Negative Final    HIV 1/2 Ag/Ab 05/31/2024 Non-reactive  Non-reactive Final    Hepatitis C Ab 05/31/2024 Non-reactive  Non-reactive Final    Troponin I 05/31/2024 <0.006  0.000 - 0.026 ng/mL Final    Comment: The reference interval for Troponin I represents the 99th percentile   cutoff   for our facility and is consistent with 3rd generation assay   performance.      TSH 05/31/2024 2.213  0.400 - 4.000 uIU/mL Final    Magnesium 05/31/2024 2.0  1.6 - 2.6 mg/dL Final    BNP 05/31/2024 <10  0 - 99 pg/mL Final     Values of less than 100 pg/ml are consistent with non-CHF populations.    SARS-CoV2 (COVID-19) Qualitative P* 05/31/2024 Not Detected  Not Detected Final    Comment: This test utilizes a real-time reverse transcription  polymerase chain reaction procedure to amplify and  detect the SARS-CoV-2 and detect the SARS-CoV-2 N2 and E nucleic  acid targets. The analytical sensitivity (limit of detection) of  this assay is 250 copies/mL.    A Detected result implies that the patient is infected with the  SARS-CoV-2 virus and is presumed to be contagious.  A Not Detected result implies that the SARS-CoV-2 target nucleic  acids are not present above the limit of detection. It does not  rule out the possibility of COVID-19 and should not be the sole  basis for treatment decisions. If COVID-19 is strongly suspected  based on clinical and epidemiological history, re-testing should  be considered.    This test is Food and Drug Administration (FDA) approved. Performance   characteristics of this has been independently verified by [facility] Department of Pathology and Laboratory Medicine.      Influenza A, Molecular 05/31/2024 Not Detected  Not Detected Final    Influenza B, Molecular 05/31/2024 Not Detected  Not Detected Final    RSV Ag by Molecular Method 05/31/2024 Not Detected  Not Detected Final    POC Glucose 05/31/2024 96  70 - 110 mg/dL Final    POC BUN 05/31/2024 15  6 - 30 mg/dL Final    POC Creatinine 05/31/2024 1.0  0.5 - 1.4 mg/dL Final    POC Sodium 05/31/2024 138  136 - 145 mmol/L Final    POC Potassium 05/31/2024 4.4  3.5 - 5.1 mmol/L Final    POC Chloride 05/31/2024 105  95 - 110 mmol/L Final    POC TCO2 (MEASURED) 05/31/2024 27  23 - 29 mmol/L Final    POC Ionized Calcium 05/31/2024 1.20  1.06 - 1.42 mmol/L Final    POC Hematocrit 05/31/2024 41  36 - 54 %PCV Final    Sample 05/31/2024 CAROL   Final    POC Preg Test, Ur 05/31/2024 Negative  Negative Final     Acceptable 05/31/2024 Yes    Final    WBC 05/31/2024 5.86  3.90 - 12.70 K/uL Final    RBC 05/31/2024 4.28  4.00 - 5.40 M/uL Final    Hemoglobin 05/31/2024 12.9  12.0 - 16.0 g/dL Final    Hematocrit 05/31/2024 39.8  37.0 - 48.5 % Final    MCV 05/31/2024 93  82 - 98 fL Final    MCH 05/31/2024 30.1  27.0 - 31.0 pg Final    MCHC 05/31/2024 32.4  32.0 - 36.0 g/dL Final    RDW 05/31/2024 13.2  11.5 - 14.5 % Final    Platelets 05/31/2024 315  150 - 450 K/uL Final    MPV 05/31/2024 9.0 (L)  9.2 - 12.9 fL Final    Immature Granulocytes 05/31/2024 0.3  0.0 - 0.5 % Final    Gran # (ANC) 05/31/2024 3.1  1.8 - 7.7 K/uL Final    Immature Grans (Abs) 05/31/2024 0.02  0.00 - 0.04 K/uL Final    Comment: Mild elevation in immature granulocytes is non specific and   can be seen in a variety of conditions including stress response,   acute inflammation, trauma and pregnancy. Correlation with other   laboratory and clinical findings is essential.      Lymph # 05/31/2024 2.0  1.0 - 4.8 K/uL Final    Mono # 05/31/2024 0.4  0.3 - 1.0 K/uL Final    Eos # 05/31/2024 0.2  0.0 - 0.5 K/uL Final    Baso # 05/31/2024 0.06  0.00 - 0.20 K/uL Final    nRBC 05/31/2024 0  0 /100 WBC Final    Gran % 05/31/2024 53.0  38.0 - 73.0 % Final    Lymph % 05/31/2024 34.8  18.0 - 48.0 % Final    Mono % 05/31/2024 7.3  4.0 - 15.0 % Final    Eosinophil % 05/31/2024 3.6  0.0 - 8.0 % Final    Basophil % 05/31/2024 1.0  0.0 - 1.9 % Final    Differential Method 05/31/2024 Automated   Final    RBC, UA 05/31/2024 7 (H)  0 - 4 /hpf Final    WBC, UA 05/31/2024 1  0 - 5 /hpf Final    Bacteria 05/31/2024 Few (A)  None-Occ /hpf Final    Squam Epithel, UA 05/31/2024 11  /hpf Final    Hyaline Casts, UA 05/31/2024 20 (A)  0-1/lpf /lpf Final    Microscopic Comment 05/31/2024 SEE COMMENT   Final    Comment: Other formed elements not mentioned in the report are not   present in the microscopic examination.          Imaging  US Abdomen Limited    Result Date: 6/4/2024  EXAMINATION: US ABDOMEN LIMITED CLINICAL  HISTORY: . Abnormal levels of other serum enzymes TECHNIQUE: Limited ultrasound of the right upper quadrant of the abdomen including pancreas, liver, gallbladder, common bile duct was performed. COMPARISON: None. FINDINGS: Pancreas: The visualized portions of pancreas appear normal. Liver: Normal in size, measuring 14.0 cm. Diffusely increased echotexture. No focal hepatic lesions. Hepatorenal index 1.74. Gallbladder: No calculi, wall thickening, or pericholecystic fluid.  No sonographic Roach's sign. Biliary system: The common duct is not dilated, measuring 3 mm.  No intrahepatic ductal dilatation. Spleen: Normal in size with a homogeneous echotexture, measuring 9.0 x 3.0 cm. Miscellaneous: No ascites.     Hepatic steatosis. Electronically signed by resident: Yossi Longo Date:    06/04/2024 Time:    08:33 Electronically signed by: Kevin Jacques MD Date:    06/04/2024 Time:    09:01      Assessment  1. Palpitations  Likely sinus tachycardia  - Ambulatory referral/consult to Cardiology  - Holter monitor - 24 hour; Future  - Echo; Future      Plan and Discussion    Discussed inappropriate sinus tachycardia.  Discussed looking for other etiologies.  Appropriate blood work has already been performed.  Will get a Holter monitor to assess variability through the day.  Echocardiogram to assess for structural heart disease.  Empiric trial of metoprolol.    The ASCVD Risk score (Juan DK, et al., 2019) failed to calculate for the following reasons:    Cannot find a previous HDL lab    Cannot find a previous total cholesterol lab     Follow Up  Follow up in about 1 month (around 7/12/2024).      Elliott Fam MD

## 2024-06-24 ENCOUNTER — PATIENT MESSAGE (OUTPATIENT)
Dept: INTERNAL MEDICINE | Facility: CLINIC | Age: 49
End: 2024-06-24
Payer: MEDICARE

## 2024-06-24 DIAGNOSIS — E89.0 POSTOPERATIVE HYPOTHYROIDISM: Primary | ICD-10-CM

## 2024-06-24 RX ORDER — LEVOTHYROXINE SODIUM 50 UG/1
50 TABLET ORAL
Qty: 90 TABLET | Refills: 3 | Status: SHIPPED | OUTPATIENT
Start: 2024-06-24

## 2024-06-26 ENCOUNTER — HOSPITAL ENCOUNTER (OUTPATIENT)
Dept: CARDIOLOGY | Facility: OTHER | Age: 49
Discharge: HOME | End: 2024-06-26
Attending: INTERNAL MEDICINE
Payer: MEDICARE

## 2024-06-26 VITALS
HEIGHT: 65 IN | BODY MASS INDEX: 25.66 KG/M2 | SYSTOLIC BLOOD PRESSURE: 122 MMHG | DIASTOLIC BLOOD PRESSURE: 84 MMHG | WEIGHT: 154 LBS

## 2024-06-26 DIAGNOSIS — R00.2 PALPITATIONS: ICD-10-CM

## 2024-06-26 LAB
ASCENDING AORTA: 3.11 CM
AV INDEX (PROSTH): 0.75
AV MEAN GRADIENT: 2 MMHG
AV PEAK GRADIENT: 4 MMHG
AV VALVE AREA BY VELOCITY RATIO: 2.64 CM²
AV VALVE AREA: 2.28 CM²
AV VELOCITY RATIO: 0.87
BSA FOR ECHO PROCEDURE: 1.79 M2
CV ECHO LV RWT: 0.38 CM
DOP CALC AO PEAK VEL: 0.97 M/S
DOP CALC AO VTI: 19.5 CM
DOP CALC LVOT AREA: 3 CM2
DOP CALC LVOT DIAMETER: 1.97 CM
DOP CALC LVOT PEAK VEL: 0.84 M/S
DOP CALC LVOT STROKE VOLUME: 44.48 CM3
DOP CALC RVOT PEAK VEL: 0.56 M/S
DOP CALC RVOT VTI: 12 CM
DOP CALCLVOT PEAK VEL VTI: 14.6 CM
E WAVE DECELERATION TIME: 159.76 MSEC
E/A RATIO: 0.96
E/E' RATIO: 3.6 M/S
ECHO LV POSTERIOR WALL: 0.66 CM (ref 0.6–1.1)
FRACTIONAL SHORTENING: 35 % (ref 28–44)
INTERVENTRICULAR SEPTUM: 0.65 CM (ref 0.6–1.1)
IVC DIAMETER: 1.11 CM
IVRT: 91.34 MSEC
LA MAJOR: 3.39 CM
LA MINOR: 4.28 CM
LA WIDTH: 2.8 CM
LEFT ATRIUM AREA SYSTOLIC (APICAL 2 CHAMBER): 12.75 CM2
LEFT ATRIUM AREA SYSTOLIC (APICAL 4 CHAMBER): 8.86 CM2
LEFT ATRIUM SIZE: 3.28 CM
LEFT ATRIUM VOLUME INDEX MOD: 13.2 ML/M2
LEFT ATRIUM VOLUME INDEX: 16.7 ML/M2
LEFT ATRIUM VOLUME MOD: 23.45 CM3
LEFT ATRIUM VOLUME: 29.53 CM3
LEFT INTERNAL DIMENSION IN SYSTOLE: 2.23 CM (ref 2.1–4)
LEFT VENTRICLE DIASTOLIC VOLUME INDEX: 27.65 ML/M2
LEFT VENTRICLE DIASTOLIC VOLUME: 48.94 ML
LEFT VENTRICLE END SYSTOLIC VOLUME APICAL 2 CHAMBER: 26.64 ML
LEFT VENTRICLE END SYSTOLIC VOLUME APICAL 4 CHAMBER: 16.97 ML
LEFT VENTRICLE MASS INDEX: 32 G/M2
LEFT VENTRICLE SYSTOLIC VOLUME INDEX: 9.5 ML/M2
LEFT VENTRICLE SYSTOLIC VOLUME: 16.74 ML
LEFT VENTRICULAR INTERNAL DIMENSION IN DIASTOLE: 3.44 CM (ref 3.5–6)
LEFT VENTRICULAR MASS: 55.9 G
LV LATERAL E/E' RATIO: 3 M/S
LV SEPTAL E/E' RATIO: 4.5 M/S
LVED V (TEICH): 48.94 ML
LVES V (TEICH): 16.74 ML
LVOT MG: 1.58 MMHG
LVOT MV: 0.59 CM/S
MV PEAK A VEL: 0.47 M/S
MV PEAK E VEL: 0.45 M/S
MV STENOSIS PRESSURE HALF TIME: 46.33 MS
MV VALVE AREA P 1/2 METHOD: 4.75 CM2
OHS CV RV/LV RATIO: 0.66 CM
PISA TR MAX VEL: 1.38 M/S
PULM VEIN S/D RATIO: 1.62
PV MEAN GRADIENT: 1 MMHG
PV MV: 0.61 M/S
PV PEAK D VEL: 0.37 M/S
PV PEAK GRADIENT: 3 MMHG
PV PEAK S VEL: 0.6 M/S
PV PEAK VELOCITY: 0.82 M/S
RA MAJOR: 4.09 CM
RA PRESSURE ESTIMATED: 3 MMHG
RA WIDTH: 2.6 CM
RIGHT VENTRICULAR END-DIASTOLIC DIMENSION: 2.26 CM
RV TB RVSP: 4 MMHG
SINUS: 3.5 CM
STJ: 3.09 CM
TDI LATERAL: 0.15 M/S
TDI SEPTAL: 0.1 M/S
TDI: 0.13 M/S
TR MAX PG: 8 MMHG
TRICUSPID ANNULAR PLANE SYSTOLIC EXCURSION: 1.99 CM
TV REST PULMONARY ARTERY PRESSURE: 11 MMHG
Z-SCORE OF LEFT VENTRICULAR DIMENSION IN END DIASTOLE: -3.52
Z-SCORE OF LEFT VENTRICULAR DIMENSION IN END SYSTOLE: -2.41

## 2024-06-26 PROCEDURE — 93306 TTE W/DOPPLER COMPLETE: CPT

## 2024-06-26 PROCEDURE — 93225 XTRNL ECG REC<48 HRS REC: CPT

## 2024-06-26 PROCEDURE — 93306 TTE W/DOPPLER COMPLETE: CPT | Mod: 26,,, | Performed by: INTERNAL MEDICINE

## 2024-07-25 RX ORDER — FLUTICASONE PROPIONATE 50 MCG
1 SPRAY, SUSPENSION (ML) NASAL
Qty: 16 G | Refills: 2 | Status: SHIPPED | OUTPATIENT
Start: 2024-07-25

## 2024-08-13 ENCOUNTER — PATIENT MESSAGE (OUTPATIENT)
Dept: CARDIOLOGY | Facility: CLINIC | Age: 49
End: 2024-08-13
Payer: MEDICARE

## 2024-08-14 ENCOUNTER — OFFICE VISIT (OUTPATIENT)
Dept: OBSTETRICS AND GYNECOLOGY | Facility: CLINIC | Age: 49
End: 2024-08-14
Payer: MEDICARE

## 2024-08-14 VITALS — HEIGHT: 65 IN | WEIGHT: 152.13 LBS | BODY MASS INDEX: 25.34 KG/M2

## 2024-08-14 DIAGNOSIS — N64.4 BREAST PAIN: ICD-10-CM

## 2024-08-14 DIAGNOSIS — R61 NIGHT SWEATS: ICD-10-CM

## 2024-08-14 DIAGNOSIS — Z12.31 ENCOUNTER FOR SCREENING MAMMOGRAM FOR BREAST CANCER: ICD-10-CM

## 2024-08-14 DIAGNOSIS — R68.82 DECREASED LIBIDO: ICD-10-CM

## 2024-08-14 DIAGNOSIS — Z11.51 SCREENING FOR HPV (HUMAN PAPILLOMAVIRUS): ICD-10-CM

## 2024-08-14 DIAGNOSIS — Z12.4 SCREENING FOR CERVICAL CANCER: ICD-10-CM

## 2024-08-14 DIAGNOSIS — Z01.419 ENCOUNTER FOR GYNECOLOGICAL EXAMINATION: Primary | ICD-10-CM

## 2024-08-14 DIAGNOSIS — G47.00 INSOMNIA, UNSPECIFIED TYPE: ICD-10-CM

## 2024-08-14 PROCEDURE — 99999 PR PBB SHADOW E&M-EST. PATIENT-LVL III: CPT | Mod: PBBFAC,,, | Performed by: OBSTETRICS & GYNECOLOGY

## 2024-08-14 RX ORDER — PROGESTERONE 100 MG/1
100 CAPSULE ORAL DAILY
Qty: 90 CAPSULE | Refills: 3 | Status: SHIPPED | OUTPATIENT
Start: 2024-08-14

## 2024-08-14 RX ORDER — IBUPROFEN 800 MG/1
800 TABLET ORAL
COMMUNITY
Start: 2024-08-08

## 2024-08-14 RX ORDER — RIMEGEPANT SULFATE 75 MG/75MG
TABLET, ORALLY DISINTEGRATING ORAL
COMMUNITY
Start: 2022-09-28 | End: 2024-11-02

## 2024-08-14 RX ORDER — DICLOFENAC SODIUM 75 MG/1
TABLET, DELAYED RELEASE ORAL
COMMUNITY

## 2024-08-14 RX ORDER — MELOXICAM 15 MG/1
TABLET ORAL
COMMUNITY

## 2024-08-14 NOTE — Clinical Note
Please call into [facility]  CMPD testosterone proprionate 2% in vanicream; Apply one click 5mg daily to inner thigh or labia nightly 5 refills

## 2024-08-14 NOTE — PROGRESS NOTES
Chief Complaint: well woman exam  Annual Exam    She is established    Yanique Mcdermott is a 49 y.o. female  presents for a well woman exam.    C/o decreased libido     is on Test and there is mismatch - used cream herself in past and would like to restart    ROS:  No abdominal pain. No vaginal discharge  No breast pain or masses, No rectal bleeding       Cycles:  regular and monthly  LMP:   Contraception: The current method of family planning is Progesterone  Meds per MD: Progesterone     Last Pap:  pap & hpv negative    Last MMG: 10- birads 0, DIS then changed to [facility] right diag birads 1  BI-RADS Category:   Overall: 1 - Negative  Recommendation:  Annual screening mammography, next due 2024.    Consider additional evaluation in [facility] and screening bilateral breast MRI with IV contrast in 1 year.    Last Colonoscopy:  Cologuard neg      Past Medical History:   Diagnosis Date    Abnormal Pap smear of cervix     Hpv (Dr Benitez)    Calculus of kidney 2024    Chronic bilateral low back pain without sciatica 2022    Fibromyalgia     Generalized anxiety disorder 2024    Hepatic steatosis 2024    History of endometriosis 2018    HPV (human papilloma virus) infection 2010    LEEP Done 12 (Dr Benitez)    Hx of thyroid nodule     Mononucleosis     Pelvic floor dysfunction 2022    Postoperative hypothyroidism 09/10/2022    Recurrent major depressive disorder, in partial remission 2024    Stress incontinence 2022    Tobacco use        Past Surgical History:   Procedure Laterality Date    BREAST AUGMENTATION      CERVICAL BIOPSY  W/ LOOP ELECTRODE EXCISION  2012    Dr Benitez    INJECTION, SACROILIAC JOINT Bilateral 2022    Procedure: INJECTION,SACROILIAC JOINT bilateral;  Surgeon: Rica Gonzalez MD;  Location: [facility];  Service: Pain Management;  Laterality: Bilateral;    PELVIC  "LAPAROSCOPY  ,     THYROIDECTOMY, PARTIAL      Herthel cell    WISDOM TOOTH EXTRACTION         OB History    Para Term  AB Living   2 2 2     2   SAB IAB Ectopic Multiple Live Births           2      # Outcome Date GA Lbr Burt/2nd Weight Sex Type Anes PTL Lv   2 Term 07 38w0d  3.742 kg (8 lb 4 oz) M Vag-Spont EPI  JENNIFER   1 Term 04 38w0d  4.508 kg (9 lb 15 oz) M Vag-Spont EPI  JENNIFER      Obstetric Comments   Menarche 12       Family History   Problem Relation Name Age of Onset    Prostate cancer Paternal Grandfather      Cancer Paternal Grandfather      Breast cancer Maternal Grandmother      Cancer Maternal Grandmother      Alzheimer's disease Maternal Grandmother      Colon cancer Maternal Grandfather      Cancer Maternal Grandfather      Pancreatic cancer Maternal Grandfather      Diabetes Father      Hypertension Father      Cancer Mother      Brain cancer Mother      Breast cancer Maternal Aunt      Breast cancer Other pat gr aunt         pat gr aunt    Ovarian cancer Neg Hx         Social History     Tobacco Use    Smoking status: Former     Types: Vaping with nicotine     Passive exposure: Current    Smokeless tobacco: Never   Substance Use Topics    Alcohol use: Yes     Comment: Social    Drug use: No           Physical Exam:  Ht 5' 5" (1.651 m)   Wt 69 kg (152 lb 1.9 oz)   LMP 2024 (Approximate)   BMI 25.31 kg/m²     APPEARANCE: Well nourished, well developed, in no acute distress.  AFFECT: WNL, alert and oriented x 3  SKIN: No acne or hirsutism  BREASTS: Symmetrical, no skin changes.                      No nipple discharge.   No palpable masses bilaterally  NODES: No inguinal nor axillary LAD  ABDOMEN: soft Non tender Non distended No masses  PELVIC:   Normal external genitalia without lesions.  Normal hair distribution.   Adequate perineal body, normal urethral meatus.   No signif cystocele or rectocele.  Vagina moist and well rugated without lesions or " discharge.    Cervix pink, without lesions, discharge or tenderness.     PAP performed   Bimanual exam shows uterus to be normal size, regular, mobile and nontender.    Adnexa without masses or tenderness.    EXTREMITIES: No edema.        ASSESSMENT AND PLAN  Yanique was seen today for annual exam.    Diagnoses and all orders for this visit:    Encounter for gynecological examination   PAP done   MMGordered    Breast pain  -     progesterone (PROMETRIUM) 100 MG capsule; Take 1 capsule (100 mg total) by mouth once daily.    Night sweats  -     progesterone (PROMETRIUM) 100 MG capsule; Take 1 capsule (100 mg total) by mouth once daily.    Insomnia, unspecified type  -     progesterone (PROMETRIUM) 100 MG capsule; Take 1 capsule (100 mg total) by mouth once daily.    Decreased libido- would like to start Test cream - get labs before starting   -     TESTOSTERONE; Standing  -     Testosterone, free; Standing    Encounter for screening mammogram for breast cancer  -     Mammo Digital Screening Bilat w/ [facility]; Future      Patient was counseled today on A.C.S. Pap guidelines and recommendations for yearly pelvic exams, mammograms and monthly self breast exams; to see her PCP for other health maintenance.     Patient encouraged to register for portal and results will be sent via portal.       Health Maintenance   Topic Date Due    TETANUS VACCINE  Never done    Shingles Vaccine (1 of 2) Never done    Mammogram  11/30/2024    Colorectal Cancer Screening  02/23/2027    Lipid Panel  09/10/2027    Hepatitis C Screening  Completed

## 2024-08-14 NOTE — PROGRESS NOTES
LMP: 7-2024  Contraception: The current method of family planning is Progesterone  Meds per MD: Progesterone    Last Pap: 2021 pap & hpv negative  Last MMG: 10- birads 0, DIS then changed to [facility] right diag birads 1  Last Colonoscopy: 2-2024 Cologuard neg

## 2024-08-15 ENCOUNTER — LAB VISIT (OUTPATIENT)
Dept: LAB | Facility: HOSPITAL | Age: 49
End: 2024-08-15
Attending: OBSTETRICS & GYNECOLOGY
Payer: MEDICARE

## 2024-08-15 ENCOUNTER — PATIENT MESSAGE (OUTPATIENT)
Dept: INTERNAL MEDICINE | Facility: CLINIC | Age: 49
End: 2024-08-15
Payer: MEDICARE

## 2024-08-15 DIAGNOSIS — R74.8 ELEVATED LIVER ENZYMES: ICD-10-CM

## 2024-08-15 DIAGNOSIS — R79.9 ABNORMAL FINDING OF BLOOD CHEMISTRY, UNSPECIFIED: ICD-10-CM

## 2024-08-15 DIAGNOSIS — Z00.00 ROUTINE GENERAL MEDICAL EXAMINATION AT A HEALTH CARE FACILITY: ICD-10-CM

## 2024-08-15 DIAGNOSIS — E55.9 VITAMIN D DEFICIENCY: ICD-10-CM

## 2024-08-15 DIAGNOSIS — R68.82 DECREASED LIBIDO: ICD-10-CM

## 2024-08-15 LAB
25(OH)D3+25(OH)D2 SERPL-MCNC: 96 NG/ML (ref 30–96)
CHOLEST SERPL-MCNC: 183 MG/DL (ref 120–199)
CHOLEST/HDLC SERPL: 3.8 {RATIO} (ref 2–5)
ESTIMATED AVG GLUCOSE: 108 MG/DL (ref 68–131)
HBA1C MFR BLD: 5.4 % (ref 4–5.6)
HDLC SERPL-MCNC: 48 MG/DL (ref 40–75)
HDLC SERPL: 26.2 % (ref 20–50)
LDLC SERPL CALC-MCNC: 113.6 MG/DL (ref 63–159)
NONHDLC SERPL-MCNC: 135 MG/DL
TESTOST SERPL-MCNC: 29 NG/DL (ref 5–73)
TRIGL SERPL-MCNC: 107 MG/DL (ref 30–150)

## 2024-08-15 PROCEDURE — 36415 COLL VENOUS BLD VENIPUNCTURE: CPT | Performed by: INTERNAL MEDICINE

## 2024-08-15 PROCEDURE — 83036 HEMOGLOBIN GLYCOSYLATED A1C: CPT | Performed by: INTERNAL MEDICINE

## 2024-08-15 PROCEDURE — 84402 ASSAY OF FREE TESTOSTERONE: CPT | Performed by: OBSTETRICS & GYNECOLOGY

## 2024-08-15 PROCEDURE — 80061 LIPID PANEL: CPT | Performed by: INTERNAL MEDICINE

## 2024-08-15 PROCEDURE — 84403 ASSAY OF TOTAL TESTOSTERONE: CPT | Performed by: OBSTETRICS & GYNECOLOGY

## 2024-08-15 PROCEDURE — 82306 VITAMIN D 25 HYDROXY: CPT | Performed by: INTERNAL MEDICINE

## 2024-08-15 RX ORDER — ASPIRIN 325 MG
50000 TABLET, DELAYED RELEASE (ENTERIC COATED) ORAL
Qty: 6 CAPSULE | Refills: 3 | Status: SHIPPED | OUTPATIENT
Start: 2024-08-15

## 2024-08-15 NOTE — TELEPHONE ENCOUNTER
Vitamin D level too high.  Hold Vitamin D replacement for 30 days then restart 1 capsule of 50,000 IU every 14 days.

## 2024-08-19 ENCOUNTER — OFFICE VISIT (OUTPATIENT)
Dept: CARDIOLOGY | Facility: CLINIC | Age: 49
End: 2024-08-19
Payer: MEDICARE

## 2024-08-19 DIAGNOSIS — R00.2 PALPITATIONS: Primary | ICD-10-CM

## 2024-08-19 LAB — TESTOST FREE SERPL-MCNC: 0.8 PG/ML

## 2024-08-19 PROCEDURE — 1159F MED LIST DOCD IN RCRD: CPT | Mod: CPTII,S$GLB,, | Performed by: INTERNAL MEDICINE

## 2024-08-19 PROCEDURE — 99213 OFFICE O/P EST LOW 20 MIN: CPT | Mod: S$GLB,,, | Performed by: INTERNAL MEDICINE

## 2024-08-19 PROCEDURE — 3044F HG A1C LEVEL LT 7.0%: CPT | Mod: CPTII,S$GLB,, | Performed by: INTERNAL MEDICINE

## 2024-08-19 PROCEDURE — 1160F RVW MEDS BY RX/DR IN RCRD: CPT | Mod: CPTII,S$GLB,, | Performed by: INTERNAL MEDICINE

## 2024-08-19 PROCEDURE — 99999 PR PBB SHADOW E&M-EST. PATIENT-LVL III: CPT | Mod: PBBFAC,,, | Performed by: INTERNAL MEDICINE

## 2024-08-19 NOTE — PROGRESS NOTES
[facility]    Chief Complaint  Chief Complaint   Patient presents with    Follow-up       HPI:    No problems when taking metoprolol.  Feels that it might have helped.  But concerned about being on chronically.    Medications  Current Outpatient Medications   Medication Sig Dispense Refill    cholecalciferol, vitamin D3, 1,250 mcg (50,000 unit) capsule Take 1 capsule (50,000 Units total) by mouth every 14 (fourteen) days. 6 capsule 3    CMPD testosterone proprionate 2% in vanicream Apply one click daily to inner thigh or labia nightly 60 g 5    diclofenac (VOLTAREN) 75 MG EC tablet 1 tablet as needed Oral Twice a day for 30 days      DULoxetine (CYMBALTA) 60 MG capsule Take 1 capsule (60 mg total) by mouth once daily. 90 capsule 3    fluticasone propionate (FLONASE) 50 mcg/actuation nasal spray USE 1 SPRAY IN EACH NOSTRIL ONCE DAILY 16 g 2    ibuprofen (ADVIL,MOTRIN) 800 MG tablet Take 800 mg by mouth.      meloxicam (MOBIC) 15 MG tablet TAKE ONE TABLET BY MOUTH EVERY DAY for 90      metoprolol succinate (TOPROL-XL) 25 MG 24 hr tablet Take 1 tablet (25 mg total) by mouth once daily. 90 tablet 3    NURTEC 75 mg odt 1 tablet on the tongue and allow to dissolve Orally once every 2 days prn headache for 30 day(s)      omega 3-dha-epa-fish oil 1,200 (144-216) mg Cap Take 1 capsule by mouth once daily.      progesterone (PROMETRIUM) 100 MG capsule Take 1 capsule (100 mg total) by mouth once daily. 90 capsule 3    SYNTHROID 50 mcg tablet Take 1 tablet (50 mcg total) by mouth before breakfast. 90 tablet 3    vitamin E 400 UNIT capsule Take 1 capsule (400 Units total) by mouth once daily.      varicella-zoster [id number], PF, (SHINGRIX, PF,) 50 mcg/0.5 mL injection Inject 0.5 mLs into the muscle once. for 1 dose (Patient not taking: Reported on 8/14/2024) 1 each 0     No current facility-administered medications for this visit.        History  Past Medical History:   Diagnosis Date    Abnormal Pap smear of  cervix 2010    Hpv (Dr Benitez)    Calculus of kidney 06/03/2024    Chronic bilateral low back pain without sciatica 09/26/2022    Fibromyalgia     Generalized anxiety disorder 06/03/2024    Hepatic steatosis 06/04/2024    History of endometriosis 02/20/2018    HPV (human papilloma virus) infection 2010    LEEP Done 5-24-12 (Dr Benitez)    Hx of thyroid nodule 1999    Mononucleosis     Pelvic floor dysfunction 09/27/2022    Postoperative hypothyroidism 09/10/2022    Recurrent major depressive disorder, in partial remission 06/03/2024    Stress incontinence 09/27/2022    Tobacco use      Past Surgical History:   Procedure Laterality Date    BREAST AUGMENTATION  1994    CERVICAL BIOPSY  W/ LOOP ELECTRODE EXCISION  05/24/2012     Bone    INJECTION, SACROILIAC JOINT Bilateral 09/14/2022    Procedure: INJECTION,SACROILIAC JOINT bilateral;  Surgeon: Rica Gonzalez MD;  Location: [facility];  Service: Pain Management;  Laterality: Bilateral;    PELVIC LAPAROSCOPY  2000, 2017    THYROIDECTOMY, PARTIAL  1999    Herthel cell    WISDOM TOOTH EXTRACTION  1990     Social History     Socioeconomic History    Marital status: Single    Number of children: 2   Tobacco Use    Smoking status: Former     Types: Vaping with nicotine     Passive exposure: Current    Smokeless tobacco: Never   Substance and Sexual Activity    Alcohol use: Yes     Comment: Social    Drug use: No    Sexual activity: Yes     Partners: Male     Birth control/protection: None     Comment: :  Back with Ex   Social History Narrative    Was a surgical tech.    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        .    2 boys (one with autism)     Social Determinants of Health     Financial Resource Strain: Medium Risk (6/12/2024)    Overall Financial Resource Strain (CARDIA)     Difficulty of Paying Living Expenses: Somewhat hard   Food Insecurity: Food Insecurity Present (6/12/2024)    Hunger Vital Sign     Worried About Running Out of Food in the Last Year: Patient  declined     Ran Out of Food in the Last Year: Sometimes true   Physical Activity: Insufficiently Active (6/12/2024)    Exercise Vital Sign     Days of Exercise per Week: 2 days     Minutes of Exercise per Session: 20 min   Stress: Stress Concern Present (6/12/2024)    [employer] [place] [employer] - Occupational Stress Questionnaire     Feeling of Stress : Very much   Housing Stability: Unknown (6/12/2024)    Housing Stability Vital Sign     Unable to Pay for Housing in the Last Year: Patient declined     Family History   Problem Relation Name Age of Onset    Prostate cancer Paternal Grandfather      Cancer Paternal Grandfather      Breast cancer Maternal Grandmother      Cancer Maternal Grandmother      Alzheimer's disease Maternal Grandmother      Colon cancer Maternal Grandfather      Cancer Maternal Grandfather      Pancreatic cancer Maternal Grandfather      Diabetes Father      Hypertension Father      Cancer Mother      Brain cancer Mother      Breast cancer Maternal Aunt      Breast cancer Other pat gr aunt         pat gr aunt    Ovarian cancer Neg Hx          Allergies  Review of patient's allergies indicates:   Allergen Reactions    Minocin [minocycline] Swelling and Other (See Comments)     And Severe Joint Pain  Able to take doxycycline.    Bactrim [sulfamethoxazole-trimethoprim] Other (See Comments)     Black/dark color to tongue while taking medication (discoloration only - pt verbally stated that she has taken it before and could take it if needed)       Review of Systems   Review of Systems   Constitutional: Negative for malaise/fatigue, weight gain and weight loss.   Eyes:  Negative for visual disturbance.   Cardiovascular:  Positive for palpitations. Negative for chest pain, claudication, cyanosis, dyspnea on exertion, irregular heartbeat, leg swelling, near-syncope, orthopnea, paroxysmal nocturnal dyspnea and syncope.   Respiratory:  Negative for cough, hemoptysis, shortness of breath,  sleep disturbances due to breathing and wheezing.    Hematologic/Lymphatic: Negative for bleeding problem. Does not bruise/bleed easily.   Skin:  Negative for poor wound healing.   Musculoskeletal:  Negative for muscle cramps and myalgias.   Gastrointestinal:  Negative for abdominal pain, anorexia, diarrhea, heartburn, hematemesis, hematochezia, melena, nausea and vomiting.   Genitourinary:  Negative for hematuria and nocturia.   Neurological:  Negative for excessive daytime sleepiness, dizziness, focal weakness, light-headedness and weakness.       Physical Exam  Vitals:    08/19/24 1043   BP: (P) 104/70   Pulse: (P) 91     Wt Readings from Last 1 Encounters:   08/19/24 (P) 69.4 kg (152 lb 14.4 oz)     Physical Exam  Vitals and nursing note reviewed.   Constitutional:       General: She is not in acute distress.     Appearance: She is not toxic-appearing or diaphoretic.   HENT:      Head: Normocephalic and atraumatic.      Mouth/Throat:      Lips: Pink.      Mouth: Mucous membranes are moist.   Eyes:      General: No scleral icterus.     Conjunctiva/sclera: Conjunctivae normal.   Neck:      Thyroid: No thyromegaly.      Vascular: No carotid bruit, hepatojugular reflux or JVD.      Trachea: Trachea normal.   Cardiovascular:      Rate and Rhythm: Normal rate and regular rhythm. No extrasystoles are present.     Chest Wall: PMI is not displaced.      Pulses:           Carotid pulses are 2+ on the right side and 2+ on the left side.       Radial pulses are 2+ on the right side and 2+ on the left side.        Dorsalis pedis pulses are 2+ on the right side and 2+ on the left side.        Posterior tibial pulses are 2+ on the right side and 2+ on the left side.      Heart sounds: S1 normal and S2 normal. No murmur heard.     No friction rub. No S3 or S4 sounds.   Pulmonary:      Effort: Pulmonary effort is normal. No accessory muscle usage or respiratory distress.      Breath sounds: Normal breath sounds and air entry. No  decreased breath sounds, wheezing, rhonchi or rales.   Abdominal:      General: Bowel sounds are normal. There is no distension or abdominal bruit.      Palpations: Abdomen is soft. There is no hepatomegaly, splenomegaly or pulsatile mass.      Tenderness: There is no abdominal tenderness.   Musculoskeletal:         General: No tenderness or deformity.      Right lower leg: No edema.      Left lower leg: No edema.   Skin:     General: Skin is warm and dry.      Capillary Refill: Capillary refill takes less than 2 seconds.      Coloration: Skin is not cyanotic or pale.      Nails: There is no clubbing.   Neurological:      General: No focal deficit present.      Mental Status: She is alert and oriented to person, place, and time.   Psychiatric:         Attention and Perception: Attention normal.         Mood and Affect: Mood normal.         Speech: Speech normal.         Behavior: Behavior normal. Behavior is cooperative.         Labs  Lab Visit on 08/15/2024   Component Date Value Ref Range Status    Vit D, 25-Hydroxy 08/15/2024 96  30 - 96 ng/mL Final    Comment: Vitamin D deficiency.........<10 ng/mL                              Vitamin D insufficiency......10-29 ng/mL       Vitamin D sufficiency........> or equal to 30 ng/mL  Vitamin D toxicity............>100 ng/mL      Cholesterol 08/15/2024 183  120 - 199 mg/dL Final    Comment: The National Cholesterol Education Program (NCEP) has set the  following guidelines (reference ranges) for Cholesterol:  Optimal.....................<200 mg/dL  Borderline High.............200-239 mg/dL  High........................> or = 240 mg/dL      Triglycerides 08/15/2024 107  30 - 150 mg/dL Final    Comment: The National Cholesterol Education Program (NCEP) has set the  following guidelines (reference values) for triglycerides:  Normal......................<150 mg/dL  Borderline High.............150-199 mg/dL  High........................200-499 mg/dL      HDL 08/15/2024 48  40  - 75 mg/dL Final    Comment: The National Cholesterol Education Program (NCEP) has set the  following guidelines (reference values) for HDL Cholesterol:  Low...............<40 mg/dL  Optimal...........>60 mg/dL      LDL Cholesterol 08/15/2024 113.6  63.0 - 159.0 mg/dL Final    Comment: The National Cholesterol Education Program (NCEP) has set the  following guidelines (reference values) for LDL Cholesterol:  Optimal.......................<130 mg/dL  Borderline High...............130-159 mg/dL  High..........................160-189 mg/dL  Very High.....................>190 mg/dL      HDL/Cholesterol Ratio 08/15/2024 26.2  20.0 - 50.0 % Final    Total Cholesterol/HDL Ratio 08/15/2024 3.8  2.0 - 5.0 Final    Non-HDL Cholesterol 08/15/2024 135  mg/dL Final    Comment: Risk category and Non-HDL cholesterol goals:  Coronary heart disease (CHD)or equivalent (10-year risk of CHD >20%):  Non-HDL cholesterol goal     <130 mg/dL  Two or more CHD risk factors and 10-year risk of CHD <= 20%:  Non-HDL cholesterol goal     <160 mg/dL  0 to 1 CHD risk factor:  Non-HDL cholesterol goal     <190 mg/dL      Hemoglobin A1C 08/15/2024 5.4  4.0 - 5.6 % Final    Comment: ADA Screening Guidelines:  5.7-6.4%  Consistent with prediabetes  >or=6.5%  Consistent with diabetes    High levels of fetal hemoglobin interfere with the HbA1C  assay. Heterozygous hemoglobin variants (HbS, HgC, etc)do  not significantly interfere with this assay.   However, presence of multiple variants may affect accuracy.      Estimated Avg Glucose 08/15/2024 108  68 - 131 mg/dL Final    Testosterone, Total 08/15/2024 29  5 - 73 ng/dL Final   Hospital Outpatient Visit on 06/26/2024   Component Date Value Ref Range Status    Event Monitor Day 06/26/2024 0   Final    holter length minutes 06/26/2024 0   Final    Holter length hours 06/26/2024 24   Final    holter length dec hours 06/26/2024 24.00   Final    Sinus min HR 06/26/2024 56   Final    Sinus max hr 06/26/2024  152   Final    Sinus avg hr 06/26/2024 95   Final   Hospital Outpatient Visit on 06/26/2024   Component Date Value Ref Range Status    BSA 06/26/2024 1.79  m2 Final    TAPSE 06/26/2024 1.99  cm Final    LA WIDTH 06/26/2024 2.8  cm Final    RA Width 06/26/2024 2.6  cm Final    Sinus 06/26/2024 3.5  cm Final    LVOT stroke volume 06/26/2024 44.48  cm3 Final    LVIDd 06/26/2024 3.44 (A)  3.5 - 6.0 cm Final    LV Systolic Volume 06/26/2024 16.74  mL Final    LV Systolic Volume Index 06/26/2024 9.5  mL/m2 Final    LVIDs 06/26/2024 2.23  2.1 - 4.0 cm Final    LV ESV A2C 06/26/2024 26.64  mL Final    LV Diastolic Volume 06/26/2024 48.94  mL Final    LV ESV A4C 06/26/2024 16.97  mL Final    LV Diastolic Volume Index 06/26/2024 27.65  mL/m2 Final    Left Ventricular End Systolic Volu* 06/26/2024 16.74  mL Final    Left Ventricular End Diastolic Vol* 06/26/2024 48.94  mL Final    IVS 06/26/2024 0.65  0.6 - 1.1 cm Final    LVOT diameter 06/26/2024 1.97  cm Final    LVOT area 06/26/2024 3.0  cm2 Final    FS 06/26/2024 35  28 - 44 % Final    Left Ventricle Relative Wall Thick* 06/26/2024 0.38  cm Final    Posterior Wall 06/26/2024 0.66  0.6 - 1.1 cm Final    LV mass 06/26/2024 55.90  g Final    LV Mass Index 06/26/2024 32  g/m2 Final    MV Peak E Romain 06/26/2024 0.45  m/s Final    TDI LATERAL 06/26/2024 0.15  m/s Final    TDI SEPTAL 06/26/2024 0.10  m/s Final    E/E' ratio 06/26/2024 3.60  m/s Final    MV Peak A Romain 06/26/2024 0.47  m/s Final    TR Max Romain 06/26/2024 1.38  m/s Final    E/A ratio 06/26/2024 0.96   Final    IVRT 06/26/2024 91.34  msec Final    E wave deceleration time 06/26/2024 159.76  msec Final    LV SEPTAL E/E' RATIO 06/26/2024 4.50  m/s Final    LA Volume Index 06/26/2024 16.7  mL/m2 Final    LV LATERAL E/E' RATIO 06/26/2024 3.00  m/s Final    LA volume 06/26/2024 29.53  cm3 Final    PV Peak S Romain 06/26/2024 0.60  m/s Final    PV Peak D Romain 06/26/2024 0.37  m/s Final    Pulm vein S/D ratio 06/26/2024 1.62    Final    LVOT peak isaiah 06/26/2024 0.84  m/s Final    Left Ventricular Outflow Tract Jessica* 06/26/2024 0.59  cm/s Final    Left Ventricular Outflow Tract Jessica* 06/26/2024 1.58  mmHg Final    RVOT peak VTI 06/26/2024 12.0  cm Final    RV/LV Ratio 06/26/2024 0.66  cm Final    LA size 06/26/2024 3.28  cm Final    Left Atrium Minor Axis 06/26/2024 4.28  cm Final    Left Atrium Major Axis 06/26/2024 3.39  cm Final    LA volume (mod) 06/26/2024 23.45  cm3 Final    LA Volume Index (Mod) 06/26/2024 13.2  mL/m2 Final    RA Major Mercer 06/26/2024 4.09  cm Final    AV mean gradient 06/26/2024 2  mmHg Final    AV peak gradient 06/26/2024 4  mmHg Final    Ao peak isaiha 06/26/2024 0.97  m/s Final    Ao VTI 06/26/2024 19.50  cm Final    LVOT peak VTI 06/26/2024 14.60  cm Final    AV valve area 06/26/2024 2.28  cm² Final    AV Velocity Ratio 06/26/2024 0.87   Final    AV index (prosthetic) 06/26/2024 0.75   Final    JEN by Velocity Ratio 06/26/2024 2.64  cm² Final    MV stenosis pressure 1/2 time 06/26/2024 46.33  ms Final    MV valve area p 1/2 method 06/26/2024 4.75  cm2 Final    Triscuspid Valve Regurgitation Pea* 06/26/2024 8  mmHg Final    PV mean gradient 06/26/2024 1  mmHg Final    PV PEAK VELOCITY 06/26/2024 0.82  m/s Final    PV peak gradient 06/26/2024 3  mmHg Final    Pulmonary Valve Mean Velocity 06/26/2024 0.61  m/s Final    RVOT peak isaiah 06/26/2024 0.56  m/s Final    STJ 06/26/2024 3.09  cm Final    Ascending aorta 06/26/2024 3.11  cm Final    IVC diameter 06/26/2024 1.11  cm Final    Mean e' 06/26/2024 0.13  m/s Final    ZLVIDS 06/26/2024 -2.41   Final    ZLVIDD 06/26/2024 -3.52   Final    LA area A4C 06/26/2024 8.86  cm2 Final    LA area A2C 06/26/2024 12.75  cm2 Final    RVDD 06/26/2024 2.26  cm Final    TV resting pulmonary artery pressu* 06/26/2024 11  mmHg Final    RV TB RVSP 06/26/2024 4  mmHg Final    Est. RA pres 06/26/2024 3  mmHg Final   Admission on 05/31/2024, Discharged on 05/31/2024   Component Date Value Ref  Range Status    QRS Duration 05/31/2024 76  ms Final    [facility] QTC Calculation 05/31/2024 411  ms Final    Sodium 05/31/2024 139  136 - 145 mmol/L Final    Potassium 05/31/2024 4.6  3.5 - 5.1 mmol/L Final    Chloride 05/31/2024 105  95 - 110 mmol/L Final    CO2 05/31/2024 22 (L)  23 - 29 mmol/L Final    Glucose 05/31/2024 85  70 - 110 mg/dL Final    BUN 05/31/2024 13  6 - 20 mg/dL Final    Creatinine 05/31/2024 1.0  0.5 - 1.4 mg/dL Final    Calcium 05/31/2024 10.2  8.7 - 10.5 mg/dL Final    Total Protein 05/31/2024 8.8 (H)  6.0 - 8.4 g/dL Final    Albumin 05/31/2024 4.0  3.5 - 5.2 g/dL Final    Total Bilirubin 05/31/2024 0.3  0.1 - 1.0 mg/dL Final    Comment: For infants and newborns, interpretation of results should be based  on gestational age, weight and in agreement with clinical  observations.    Premature Infant recommended reference ranges:  Up to 24 hours.............<8.0 mg/dL  Up to 48 hours............<12.0 mg/dL  3-5 days..................<15.0 mg/dL  6-29 days.................<15.0 mg/dL      Alkaline Phosphatase 05/31/2024 87  55 - 135 U/L Final    AST 05/31/2024 41 (H)  10 - 40 U/L Final    ALT 05/31/2024 60 (H)  10 - 44 U/L Final    eGFR 05/31/2024 >60.0  >60 mL/min/1.73 m^2 Final    Anion Gap 05/31/2024 12  8 - 16 mmol/L Final    QRS Duration 05/31/2024 72  ms Final    [facility]HS QTC Calculation 05/31/2024 415  ms Final    Sed Rate 05/31/2024 29  0 - 36 mm/Hr Final    CRP 05/31/2024 5.9  0.0 - 8.2 mg/L Final    Specimen UA 05/31/2024 Urine, Clean Catch   Final    Color, UA 05/31/2024 Yellow  Yellow, Straw, Ashly Final    Appearance, UA 05/31/2024 Clear  Clear Final    pH, UA 05/31/2024 6.0  5.0 - 8.0 Final    Specific Gravity, UA 05/31/2024 1.025  1.005 - 1.030 Final    Protein, UA 05/31/2024 Negative  Negative Final    Comment: Recommend a 24 hour urine protein or a urine   protein/creatinine ratio if globulin induced proteinuria is  clinically suspected.      Glucose, UA 05/31/2024 Negative  Negative Final     Ketones, UA 05/31/2024 Negative  Negative Final    Bilirubin (UA) 05/31/2024 Negative  Negative Final    Occult Blood UA 05/31/2024 Negative  Negative Final    Nitrite, UA 05/31/2024 Negative  Negative Final    Leukocytes, UA 05/31/2024 Trace (A)  Negative Final    HIV 1/2 Ag/Ab 05/31/2024 Non-reactive  Non-reactive Final    Hepatitis C Ab 05/31/2024 Non-reactive  Non-reactive Final    Troponin I 05/31/2024 <0.006  0.000 - 0.026 ng/mL Final    Comment: The reference interval for Troponin I represents the 99th percentile   cutoff   for our facility and is consistent with 3rd generation assay   performance.      TSH 05/31/2024 2.213  0.400 - 4.000 uIU/mL Final    Magnesium 05/31/2024 2.0  1.6 - 2.6 mg/dL Final    BNP 05/31/2024 <10  0 - 99 pg/mL Final    Values of less than 100 pg/ml are consistent with non-CHF populations.    SARS-CoV2 (COVID-19) Qualitative P* 05/31/2024 Not Detected  Not Detected Final    Comment: This test utilizes a real-time reverse transcription  polymerase chain reaction procedure to amplify and  detect the SARS-CoV-2 and detect the SARS-CoV-2 N2 and E nucleic  acid targets. The analytical sensitivity (limit of detection) of  this assay is 250 copies/mL.    A Detected result implies that the patient is infected with the  SARS-CoV-2 virus and is presumed to be contagious.  A Not Detected result implies that the SARS-CoV-2 target nucleic  acids are not present above the limit of detection. It does not  rule out the possibility of COVID-19 and should not be the sole  basis for treatment decisions. If COVID-19 is strongly suspected  based on clinical and epidemiological history, re-testing should  be considered.    This test is Food and Drug Administration (FDA) approved. Performance   characteristics of this has been independently verified by [facility] Department of Pathology and Laboratory Medicine.      Influenza A, Molecular 05/31/2024 Not Detected  Not Detected Final     Influenza B, Molecular 05/31/2024 Not Detected  Not Detected Final    RSV Ag by Molecular Method 05/31/2024 Not Detected  Not Detected Final    POC Glucose 05/31/2024 96  70 - 110 mg/dL Final    POC BUN 05/31/2024 15  6 - 30 mg/dL Final    POC Creatinine 05/31/2024 1.0  0.5 - 1.4 mg/dL Final    POC Sodium 05/31/2024 138  136 - 145 mmol/L Final    POC Potassium 05/31/2024 4.4  3.5 - 5.1 mmol/L Final    POC Chloride 05/31/2024 105  95 - 110 mmol/L Final    POC TCO2 (MEASURED) 05/31/2024 27  23 - 29 mmol/L Final    POC Ionized Calcium 05/31/2024 1.20  1.06 - 1.42 mmol/L Final    POC Hematocrit 05/31/2024 41  36 - 54 %PCV Final    Sample 05/31/2024 CAROL   Final    POC Preg Test, Ur 05/31/2024 Negative  Negative Final     Acceptable 05/31/2024 Yes   Final    WBC 05/31/2024 5.86  3.90 - 12.70 K/uL Final    RBC 05/31/2024 4.28  4.00 - 5.40 M/uL Final    Hemoglobin 05/31/2024 12.9  12.0 - 16.0 g/dL Final    Hematocrit 05/31/2024 39.8  37.0 - 48.5 % Final    MCV 05/31/2024 93  82 - 98 fL Final    MCH 05/31/2024 30.1  27.0 - 31.0 pg Final    MCHC 05/31/2024 32.4  32.0 - 36.0 g/dL Final    RDW 05/31/2024 13.2  11.5 - 14.5 % Final    Platelets 05/31/2024 315  150 - 450 K/uL Final    MPV 05/31/2024 9.0 (L)  9.2 - 12.9 fL Final    Immature Granulocytes 05/31/2024 0.3  0.0 - 0.5 % Final    Gran # (ANC) 05/31/2024 3.1  1.8 - 7.7 K/uL Final    Immature Grans (Abs) 05/31/2024 0.02  0.00 - 0.04 K/uL Final    Comment: Mild elevation in immature granulocytes is non specific and   can be seen in a variety of conditions including stress response,   acute inflammation, trauma and pregnancy. Correlation with other   laboratory and clinical findings is essential.      Lymph # 05/31/2024 2.0  1.0 - 4.8 K/uL Final    Mono # 05/31/2024 0.4  0.3 - 1.0 K/uL Final    Eos # 05/31/2024 0.2  0.0 - 0.5 K/uL Final    Baso # 05/31/2024 0.06  0.00 - 0.20 K/uL Final    nRBC 05/31/2024 0  0 /100 WBC Final    Gran % 05/31/2024 53.0  38.0 -  73.0 % Final    Lymph % 05/31/2024 34.8  18.0 - 48.0 % Final    Mono % 05/31/2024 7.3  4.0 - 15.0 % Final    Eosinophil % 05/31/2024 3.6  0.0 - 8.0 % Final    Basophil % 05/31/2024 1.0  0.0 - 1.9 % Final    Differential Method 05/31/2024 Automated   Final    RBC, UA 05/31/2024 7 (H)  0 - 4 /hpf Final    WBC, UA 05/31/2024 1  0 - 5 /hpf Final    Bacteria 05/31/2024 Few (A)  None-Occ /hpf Final    Squam Epithel, UA 05/31/2024 11  /hpf Final    Hyaline Casts, UA 05/31/2024 20 (A)  0-1/lpf /lpf Final    Microscopic Comment 05/31/2024 SEE COMMENT   Final    Comment: Other formed elements not mentioned in the report are not   present in the microscopic examination.          Imaging  No results found.    Assessment  1. Palpitations  Sinus tachycardia      Plan and Discussion    Discussed metoprolol.  She feels that she had a benefit from taking it daily. Continue same.    The 10-year ASCVD risk score ([place] DK, et al., 2019) is: 1%    Values used to calculate the score:      Age: 49 years      Sex: Female      Is Non- : No      Diabetic: No      Tobacco smoker: No      Systolic Blood Pressure: 104 mmHg      Is BP treated: No      HDL Cholesterol: 48 mg/dL      Total Cholesterol: 183 mg/dL     Follow Up  Follow up in about 1 year (around 8/19/2025).      Elliott Fam MD

## 2024-08-20 ENCOUNTER — PATIENT MESSAGE (OUTPATIENT)
Dept: OBSTETRICS AND GYNECOLOGY | Facility: CLINIC | Age: 49
End: 2024-08-20
Payer: MEDICARE

## 2024-10-21 DIAGNOSIS — M51.369 DEGENERATION OF INTERVERTEBRAL DISC OF LUMBAR REGION, UNSPECIFIED WHETHER PAIN PRESENT: Primary | ICD-10-CM

## 2024-10-22 ENCOUNTER — HOSPITAL ENCOUNTER (OUTPATIENT)
Dept: RADIOLOGY | Facility: HOSPITAL | Age: 49
Discharge: HOME | End: 2024-10-22
Attending: OBSTETRICS & GYNECOLOGY
Payer: MEDICARE

## 2024-10-22 ENCOUNTER — OFFICE VISIT (OUTPATIENT)
Dept: ORTHOPEDICS | Facility: CLINIC | Age: 49
End: 2024-10-22
Payer: MEDICARE

## 2024-10-22 ENCOUNTER — HOSPITAL ENCOUNTER (OUTPATIENT)
Dept: RADIOLOGY | Facility: HOSPITAL | Age: 49
Discharge: HOME | End: 2024-10-22
Attending: ORTHOPAEDIC SURGERY
Payer: MEDICARE

## 2024-10-22 VITALS — HEIGHT: 65 IN | WEIGHT: 152 LBS | BODY MASS INDEX: 25.33 KG/M2

## 2024-10-22 VITALS — HEIGHT: 65 IN | BODY MASS INDEX: 25.3 KG/M2 | WEIGHT: 151.88 LBS

## 2024-10-22 DIAGNOSIS — R63.4 WEIGHT LOSS: Primary | ICD-10-CM

## 2024-10-22 DIAGNOSIS — M54.9 DORSALGIA, UNSPECIFIED: ICD-10-CM

## 2024-10-22 DIAGNOSIS — M51.369 DEGENERATION OF INTERVERTEBRAL DISC OF LUMBAR REGION, UNSPECIFIED WHETHER PAIN PRESENT: ICD-10-CM

## 2024-10-22 DIAGNOSIS — Z12.31 ENCOUNTER FOR SCREENING MAMMOGRAM FOR BREAST CANCER: ICD-10-CM

## 2024-10-22 DIAGNOSIS — M50.30 DDD (DEGENERATIVE DISC DISEASE), CERVICAL: ICD-10-CM

## 2024-10-22 PROCEDURE — 72110 X-RAY EXAM L-2 SPINE 4/>VWS: CPT | Mod: 26,,, | Performed by: INTERNAL MEDICINE

## 2024-10-22 PROCEDURE — 72110 X-RAY EXAM L-2 SPINE 4/>VWS: CPT | Mod: TC

## 2024-10-22 PROCEDURE — 3008F BODY MASS INDEX DOCD: CPT | Mod: CPTII,S$GLB,, | Performed by: ORTHOPAEDIC SURGERY

## 2024-10-22 PROCEDURE — 3044F HG A1C LEVEL LT 7.0%: CPT | Mod: CPTII,S$GLB,, | Performed by: ORTHOPAEDIC SURGERY

## 2024-10-22 PROCEDURE — 99213 OFFICE O/P EST LOW 20 MIN: CPT | Mod: S$GLB,,, | Performed by: ORTHOPAEDIC SURGERY

## 2024-10-22 PROCEDURE — 1159F MED LIST DOCD IN RCRD: CPT | Mod: CPTII,S$GLB,, | Performed by: ORTHOPAEDIC SURGERY

## 2024-10-22 PROCEDURE — 99999 PR PBB SHADOW E&M-EST. PATIENT-LVL IV: CPT | Mod: PBBFAC,,, | Performed by: ORTHOPAEDIC SURGERY

## 2024-10-22 PROCEDURE — 77063 BREAST TOMOSYNTHESIS BI: CPT | Mod: TC

## 2024-10-22 PROCEDURE — 77067 SCR MAMMO BI INCL CAD: CPT | Mod: TC

## 2024-10-22 NOTE — PROGRESS NOTES
DATE: 10/22/2024  PATIENT: Yanique Mcdermott    Attending Physician: Andry Laguerre M.D.    HISTORY:  Yanique Mcdermott is a 49 y.o. female who returns to me today for follow up.  She was last seen by me 8/29/2022.  Today she is doing well but notes she had bilateral SIJ injections on 9/14/22 and lumbar RFAs in 2023 with LA pain with temporary relief. She continues to have neck and low back pain. She has tried multiple rounds of PT with continued home exercises for 8 weeks in the last 3 months.     The Patient denies myelopathic symptoms such as handwriting changes or difficulty with buttons/coins/keys. Denies perineal paresthesias, bowel/bladder dysfunction.      EXAM:  LMP 09/09/2024     My physical examination was notable for the following findings:     Musculoskeletal and neuro exam stable      IMAGING:    Today I personally re- reviewed AP, Lat and Flex/Ex  upright L-spine that demonstrate mild degenerative changes at L5-S1    MRI lumbar (outside facility 2022) demonstrates facet arthropathy at L4-L5 and L5-S1. No significant spinal stenosis or neural foraminal narrowing.      There is no height or weight on file to calculate BMI.    Hemoglobin A1C   Date Value Ref Range Status   08/15/2024 5.4 4.0 - 5.6 % Final     Comment:     ADA Screening Guidelines:  5.7-6.4%  Consistent with prediabetes  >or=6.5%  Consistent with diabetes    High levels of fetal hemoglobin interfere with the HbA1C  assay. Heterozygous hemoglobin variants (HbS, HgC, etc)do  not significantly interfere with this assay.   However, presence of multiple variants may affect accuracy.           ASSESSMENT/PLAN:    There are no diagnoses linked to this encounter.    Today we discussed at length all of the different treatment options including anti-inflammatories, acetaminophen, rest, ice, heat, physical therapy including strengthening and stretching exercises, home exercises, ROM, aerobic conditioning, aqua therapy, other modalities  including ultrasound, massage, and dry needling, epidural steroid injections and finally surgical intervention.      Pt presents with chronic neck and low back pain. Failure of conservative rx. Will obtain new MRIs and call with results.

## 2024-10-30 ENCOUNTER — HOSPITAL ENCOUNTER (OUTPATIENT)
Dept: RADIOLOGY | Facility: HOSPITAL | Age: 49
Discharge: HOME | End: 2024-10-30
Attending: ORTHOPAEDIC SURGERY
Payer: MEDICARE

## 2024-10-30 DIAGNOSIS — M54.9 DORSALGIA, UNSPECIFIED: ICD-10-CM

## 2024-10-30 DIAGNOSIS — M50.30 DDD (DEGENERATIVE DISC DISEASE), CERVICAL: ICD-10-CM

## 2024-10-30 PROCEDURE — 72141 MRI NECK SPINE W/O DYE: CPT | Mod: TC

## 2024-10-30 PROCEDURE — 72148 MRI LUMBAR SPINE W/O DYE: CPT | Mod: TC

## 2024-10-30 PROCEDURE — 72148 MRI LUMBAR SPINE W/O DYE: CPT | Mod: 26,,, | Performed by: RADIOLOGY

## 2024-10-30 PROCEDURE — 72141 MRI NECK SPINE W/O DYE: CPT | Mod: 26,,, | Performed by: RADIOLOGY

## 2024-10-31 ENCOUNTER — TELEPHONE (OUTPATIENT)
Dept: OBSTETRICS AND GYNECOLOGY | Facility: CLINIC | Age: 49
End: 2024-10-31
Payer: MEDICARE

## 2024-10-31 DIAGNOSIS — N83.209 CYST OF OVARY, UNSPECIFIED LATERALITY: Primary | ICD-10-CM

## 2024-10-31 NOTE — TELEPHONE ENCOUNTER
Pt had MRI for back pain.  Possible ovarian cyst 3.4cm noted on report.  Pt denies pelvic pain, bloating, and AUB specifically.  H/o ovarian cysts.  Advised if she isn't having any complaints she can watch it.  Reassured her ovarian cysts come and go throughout cycle.  If she would like to do an US to get better imaging of it we can.  She is fine with watching it but just wanted it noted in her chart that she has one.

## 2024-11-03 NOTE — TELEPHONE ENCOUNTER
Agree  Lets do an u/s in a few weeks to make sure the cyst has resolved  It is small but we want to follow it  Please schedule f/u u/s and appt with me in 4 weeks to make sure the cyst has resolved

## 2024-11-05 ENCOUNTER — OFFICE VISIT (OUTPATIENT)
Dept: ORTHOPEDICS | Facility: CLINIC | Age: 49
End: 2024-11-05
Payer: MEDICARE

## 2024-11-05 ENCOUNTER — TELEPHONE (OUTPATIENT)
Dept: PAIN MEDICINE | Facility: CLINIC | Age: 49
End: 2024-11-05
Payer: MEDICARE

## 2024-11-05 DIAGNOSIS — M46.1 SACROILIITIS: Primary | ICD-10-CM

## 2024-11-05 DIAGNOSIS — M53.3 CHRONIC SI JOINT PAIN: Primary | ICD-10-CM

## 2024-11-05 DIAGNOSIS — G89.29 CHRONIC SI JOINT PAIN: Primary | ICD-10-CM

## 2024-11-05 PROCEDURE — 99441 PR PHYSICIAN TELEPHONE EVALUATION 5-10 MIN: CPT | Mod: 95,,, | Performed by: ORTHOPAEDIC SURGERY

## 2024-11-05 PROCEDURE — 3044F HG A1C LEVEL LT 7.0%: CPT | Mod: CPTII,95,, | Performed by: ORTHOPAEDIC SURGERY

## 2024-11-05 NOTE — TELEPHONE ENCOUNTER
----- Message from Elsy Alegria PA-C sent at 2024  1:01 PM CST -----  Regarding: Order for JULIANNE GIFFORD    Patient Name: JULIANNE GIFFORD([id number])  Sex: Female  : 1975      PCP: SUZAN HUNTER    Center: [facility] ([facility])     Level of Service:39208     OK OFFICE/OUTPT VISIT, EST, LEVL III, 20-29 MIN    Types of orders made on 2024: Procedure Request    Order Date:2024  Ordering User:ELSY ALEGRIA [[id number]]  Encounter Provider:Elsy Alegria PA-C [[id number]]  Authorizing Provider: Elsy Alegria PA-C [[id number]]  Supervising Provider:KAITLIN FUNK [[id number]]  Type of Supervision:Supervision Required  Department:[facility][[id number]]    Common Order Information  Procedure -> Sacroiliac Injection (Specify laterality) Cmt: bilat    Order Specific Information  Order: Procedure Order to Pain Management [Custom: [id number]]  Order #:          [id number]: 1 FUTURE    Priority: Routine  Class: Clinic Performed    Future Order Information      Expires on:2025            Expected by:2024                   Associated Diagnoses      M53.3, G89.29 Chronic SI joint pain      Facility Name: -> [facility]           Priority: Routine  Class: Clinic Performed    Future Order Information      Expires on:2025            Expected by:2024                   Associated Diagnoses      M53.3, G89.29 Chronic SI joint pain      Procedure -> Sacroiliac Injection (Specify laterality) Cmt: bilat        Facility Name: -> [facility]

## 2024-11-05 NOTE — PROGRESS NOTES
Established Patient - Audio Only Telehealth Visit     The patient location is: home  The chief complaint leading to consultation is: MRI results  Visit type: Virtual visit with audio only (telephone)  Total time spent with patient: 10 min       The reason for the audio only service rather than synchronous audio and video virtual visit was related to technical difficulties or patient preference/necessity.     Each patient to whom I provide medical services by telemedicine is:  (1) informed of the relationship between the physician and patient and the respective role of any other health care provider with respect to management of the patient; and (2) notified that they may decline to receive medical services by telemedicine and may withdraw from such care at any time. Patient verbally consented to receive this service via voice-only telephone call.    DATE: 11/5/2024  PATIENT: Yanique Mcdermott    Attending Physician: Andry Laguerre M.D.    HISTORY:  Yanique Mcdermott is a 49 y.o. female who returns to me today for MRI results.  She was last seen by me 10/22/2024.  Today she is doing well but notes she had bilateral SIJ injections on 9/14/22 and lumbar RFAs in 2023 with LA pain with temporary relief. She continues to have neck and low back pain. She has tried multiple rounds of PT with continued home exercises for 8 weeks in the last 3 months.      The Patient denies myelopathic symptoms such as handwriting changes or difficulty with buttons/coins/keys. Denies perineal paresthesias, bowel/bladder dysfunction.      EXAM:  [facility] 09/09/2024     My physical examination was notable for the following findings:     Musculoskeletal and neuro exam stable      IMAGING:    Today I personally re- reviewed AP, Lat and Flex/Ex  upright L-spine that demonstrate mild degenerative changes at L5-S1     MRI lumbar demonstrates facet arthropathy with facet effusions and adjacent marrow edema, potentially degenerative-reactive or  inflammatory in nature. No central stenosis    MRI cervical demonstrates multilevel degenerative changes with moderate neural foraminal narrowing from C5-7    There is no height or weight on file to calculate BMI.    Hemoglobin A1C   Date Value Ref Range Status   08/15/2024 5.4 4.0 - 5.6 % Final     Comment:     ADA Screening Guidelines:  5.7-6.4%  Consistent with prediabetes  >or=6.5%  Consistent with diabetes    High levels of fetal hemoglobin interfere with the HbA1C  assay. Heterozygous hemoglobin variants (HbS, HgC, etc)do  not significantly interfere with this assay.   However, presence of multiple variants may affect accuracy.           ASSESSMENT/PLAN:    There are no diagnoses linked to this encounter.    Today we discussed at length all of the different treatment options including anti-inflammatories, acetaminophen, rest, ice, heat, physical therapy including strengthening and stretching exercises, home exercises, ROM, aerobic conditioning, aqua therapy, other modalities including ultrasound, massage, and dry needling, epidural steroid injections and finally surgical intervention.      Pt presents with chronic neck and SIJ joint pain. Failure of conservative rx. Good relief with bilateral SIJ injections, will repeat. No surgical intervention indicated at this time                     This service was not originating from a related E/M service provided within the previous 7 days nor will  to an E/M service or procedure within the next 24 hours or my soonest available appointment.  Prevailing standard of care was able to be met in this audio-only visit.

## 2024-11-06 NOTE — TELEPHONE ENCOUNTER
----- Message from Elsy Alegria PA-C sent at 2024  1:01 PM CST -----  Regarding: Order for JULIANNE GIFFORD    Patient Name: JULIANNE GIFFORD([id number])  Sex: Female  : 1975      PCP: SUZAN HUNTER    Center: [facility] ([facility])     Level of Service:96195     MA OFFICE/OUTPT VISIT, EST, LEVL III, 20-29 MIN    Types of orders made on 2024: Procedure Request    Order Date:2024  Ordering User:ELSY ALEGRIA [[id number]]  Encounter Provider:Elsy Alegria PA-C [[id number]]  Authorizing Provider: Elsy Alegria PA-C [[id number]]  Supervising Provider:KAITILN FUNK [[id number]]  Type of Supervision:Supervision Required  Department:[facility][[id number]]    Common Order Information  Procedure -> Sacroiliac Injection (Specify laterality) Cmt: bilat    Order Specific Information  Order: Procedure Order to Pain Management [Custom: [id number]]  Order #:          [id number]: 1 FUTURE    Priority: Routine  Class: Clinic Performed    Future Order Information      Expires on:2025            Expected by:2024                   Associated Diagnoses      M53.3, G89.29 Chronic SI joint pain      Facility Name: -> [facility]           Priority: Routine  Class: Clinic Performed    Future Order Information      Expires on:2025            Expected by:2024                   Associated Diagnoses      M53.3, G89.29 Chronic SI joint pain      Procedure -> Sacroiliac Injection (Specify laterality) Cmt: bilat        Facility Name: -> [facility]

## 2024-11-10 RX ORDER — FLUTICASONE PROPIONATE 50 MCG
1 SPRAY, SUSPENSION (ML) NASAL
Qty: 16 G | Refills: 2 | Status: SHIPPED | OUTPATIENT
Start: 2024-11-10

## 2024-11-26 ENCOUNTER — OFFICE VISIT (OUTPATIENT)
Dept: SURGERY | Facility: CLINIC | Age: 49
End: 2024-11-26
Payer: MEDICARE

## 2024-11-26 VITALS
DIASTOLIC BLOOD PRESSURE: 82 MMHG | HEIGHT: 65 IN | HEART RATE: 93 BPM | SYSTOLIC BLOOD PRESSURE: 120 MMHG | WEIGHT: 157.63 LBS | BODY MASS INDEX: 26.26 KG/M2

## 2024-11-26 DIAGNOSIS — K42.9 UMBILICAL HERNIA WITHOUT OBSTRUCTION AND WITHOUT GANGRENE: Primary | ICD-10-CM

## 2024-11-26 PROCEDURE — 3074F SYST BP LT 130 MM HG: CPT | Mod: CPTII,S$GLB,, | Performed by: STUDENT IN AN ORGANIZED HEALTH CARE EDUCATION/TRAINING PROGRAM

## 2024-11-26 PROCEDURE — 99999 PR PBB SHADOW E&M-EST. PATIENT-LVL IV: CPT | Mod: PBBFAC,,, | Performed by: STUDENT IN AN ORGANIZED HEALTH CARE EDUCATION/TRAINING PROGRAM

## 2024-11-26 PROCEDURE — 99204 OFFICE O/P NEW MOD 45 MIN: CPT | Mod: S$GLB,,, | Performed by: STUDENT IN AN ORGANIZED HEALTH CARE EDUCATION/TRAINING PROGRAM

## 2024-11-26 PROCEDURE — 3008F BODY MASS INDEX DOCD: CPT | Mod: CPTII,S$GLB,, | Performed by: STUDENT IN AN ORGANIZED HEALTH CARE EDUCATION/TRAINING PROGRAM

## 2024-11-26 PROCEDURE — 1159F MED LIST DOCD IN RCRD: CPT | Mod: CPTII,S$GLB,, | Performed by: STUDENT IN AN ORGANIZED HEALTH CARE EDUCATION/TRAINING PROGRAM

## 2024-11-26 PROCEDURE — 3079F DIAST BP 80-89 MM HG: CPT | Mod: CPTII,S$GLB,, | Performed by: STUDENT IN AN ORGANIZED HEALTH CARE EDUCATION/TRAINING PROGRAM

## 2024-11-26 PROCEDURE — 3044F HG A1C LEVEL LT 7.0%: CPT | Mod: CPTII,S$GLB,, | Performed by: STUDENT IN AN ORGANIZED HEALTH CARE EDUCATION/TRAINING PROGRAM

## 2024-11-26 PROCEDURE — 1160F RVW MEDS BY RX/DR IN RCRD: CPT | Mod: CPTII,S$GLB,, | Performed by: STUDENT IN AN ORGANIZED HEALTH CARE EDUCATION/TRAINING PROGRAM

## 2024-11-27 ENCOUNTER — TELEPHONE (OUTPATIENT)
Dept: PAIN MEDICINE | Facility: CLINIC | Age: 49
End: 2024-11-27
Payer: MEDICARE

## 2024-11-29 NOTE — PROGRESS NOTES
Patient ID: Yanique Mcdermott is a 49 y.o. female.    Chief Complaint: No chief complaint on file.      HPI:  HPI  49F with small umbilical bulge. Present for years but becoming more bothersome lately. No obstructive symptoms. Gained some weight recently, about 10 pounds this year.   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   HENT:  Negative for trouble swallowing.    Respiratory:  Negative for shortness of breath.    Cardiovascular:  Negative for chest pain.   Gastrointestinal:  Negative for abdominal pain, blood in stool, nausea and vomiting.   Genitourinary:  Negative for dysuria.   Musculoskeletal:  Negative for gait problem.   Skin:  Negative for rash and wound.   Allergic/Immunologic: Negative for immunocompromised state.   Neurological:  Negative for weakness.   Hematological:  Does not bruise/bleed easily.   Psychiatric/Behavioral:  Negative for agitation.        Current Outpatient Medications   Medication Sig Dispense Refill    cholecalciferol, vitamin D3, 1,250 mcg (50,000 unit) capsule Take 1 capsule (50,000 Units total) by mouth every 14 (fourteen) days. 6 capsule 3    CMPD testosterone proprionate 2% in vanicream Apply one click daily to inner thigh or labia nightly 60 g 5    DULoxetine (CYMBALTA) 60 MG capsule Take 1 capsule (60 mg total) by mouth once daily. 90 capsule 3    fluticasone propionate (FLONASE) 50 mcg/actuation nasal spray USE 1 SPRAY IN EACH NOSTRIL ONCE DAILY 16 g 2    ibuprofen (ADVIL,MOTRIN) 800 MG tablet Take 800 mg by mouth.      meloxicam (MOBIC) 15 MG tablet TAKE ONE TABLET BY MOUTH EVERY DAY for 90      metoprolol succinate (TOPROL-XL) 25 MG 24 hr tablet Take 1 tablet (25 mg total) by mouth once daily. 90 tablet 3    omega 3-dha-epa-fish oil 1,200 (144-216) mg Cap Take 1 capsule by mouth once daily.       progesterone (PROMETRIUM) 100 MG capsule Take 1 capsule (100 mg total) by mouth once daily. 90 capsule 3    SYNTHROID 50 mcg tablet Take 1 tablet (50 mcg total) by mouth before breakfast. 90 tablet 3    vitamin E 400 UNIT capsule Take 1 capsule (400 Units total) by mouth once daily.      diclofenac (VOLTAREN) 75 MG EC tablet 1 tablet as needed Oral Twice a day for 30 days       No current facility-administered medications for this visit.       Review of patient's allergies indicates:   Allergen Reactions    Minocin [minocycline] Swelling and Other (See Comments)     And Severe Joint Pain  Able to take doxycycline.    Bactrim [sulfamethoxazole-trimethoprim] Other (See Comments)     Black/dark color to tongue while taking medication (discoloration only - pt verbally stated that she has taken it before and could take it if needed)       Past Medical History:   Diagnosis Date    Abnormal Pap smear of cervix 2010    Hpv (Dr Benitez)    Calculus of kidney 06/03/2024    Chronic bilateral low back pain without sciatica 09/26/2022    Fibromyalgia     Generalized anxiety disorder 06/03/2024    Hepatic steatosis 06/04/2024    History of endometriosis 02/20/2018    HPV (human papilloma virus) infection 2010    LEEP Done 5-24-12 (Dr Benitez)    Hx of thyroid nodule 1999    Mononucleosis     Pelvic floor dysfunction 09/27/2022    Postoperative hypothyroidism 09/10/2022    Recurrent major depressive disorder, in partial remission 06/03/2024    Stress incontinence 09/27/2022    Tobacco use        Past Surgical History:   Procedure Laterality Date    AUGMENTATION OF BREAST Bilateral 1995    BREAST AUGMENTATION  1994    CERVICAL BIOPSY  W/ LOOP ELECTRODE EXCISION  05/24/2012    Dr Benitez    INJECTION, SACROILIAC JOINT Bilateral 09/14/2022    Procedure: INJECTION,SACROILIAC JOINT bilateral;  Surgeon: Rica Gonzalez MD;  Location: [facility];  Service: Pain Management;  Laterality: Bilateral;    PELVIC LAPAROSCOPY  2000, 2017     THYROIDECTOMY, PARTIAL  1999    Herthel cell    WISDOM TOOTH EXTRACTION  1990       Social History     Socioeconomic History    Marital status: Single    Number of children: 2   Tobacco Use    Smoking status: Former     Types: Vaping with nicotine     Passive exposure: Current    Smokeless tobacco: Never   Substance and Sexual Activity    Alcohol use: Yes     Comment: Social    Drug use: No    Sexual activity: Yes     Partners: Male     Birth control/protection: None     Comment: :  Back with Ex   Social History Narrative    Was a surgical tech.    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        .    2 boys (one with autism)     Social Drivers of Health     Financial Resource Strain: Medium Risk (6/12/2024)    Overall Financial Resource Strain (CARDIA)     Difficulty of Paying Living Expenses: Somewhat hard   Food Insecurity: Food Insecurity Present (6/12/2024)    Hunger Vital Sign     Worried About Running Out of Food in the Last Year: Patient declined     Ran Out of Food in the Last Year: Sometimes true   Physical Activity: Insufficiently Active (6/12/2024)    Exercise Vital Sign     Days of Exercise per Week: 2 days     Minutes of Exercise per Session: 20 min   Stress: Stress Concern Present (6/12/2024)    [employer] - Occupational Stress Questionnaire     Feeling of Stress : Very much   Housing Stability: Unknown (6/12/2024)    Housing Stability Vital Sign     Unable to Pay for Housing in the Last Year: Patient declined       Vitals:    11/26/24 1540   BP: 120/82   Pulse: 93       Physical Exam  Constitutional:       General: She is not in acute distress.     Appearance: She is well-developed.   HENT:      Head: Normocephalic and atraumatic.   Eyes:      General: No scleral icterus.  Cardiovascular:      Rate and Rhythm: Normal rate.   Pulmonary:      Effort: Pulmonary effort is normal.      Breath sounds: No stridor.   Abdominal:      General: There is no distension.      Palpations:  Abdomen is soft.      Tenderness: There is no abdominal tenderness.      Hernia: A hernia is present.       Lymphadenopathy:      Cervical: No cervical adenopathy.   Skin:     General: Skin is warm.      Findings: No erythema.   Neurological:      Mental Status: She is alert and oriented to person, place, and time.   Psychiatric:         Behavior: Behavior normal.     Body mass index is 26.23 kg/m².  Hga1c 5.4      Assessment & Plan:  49F with umbilical hernia, also diastasis  Check CT to eval  Virtual follow up

## 2024-12-02 ENCOUNTER — HOSPITAL ENCOUNTER (OUTPATIENT)
Dept: RADIOLOGY | Facility: HOSPITAL | Age: 49
Discharge: HOME | End: 2024-12-02
Attending: STUDENT IN AN ORGANIZED HEALTH CARE EDUCATION/TRAINING PROGRAM
Payer: MEDICARE

## 2024-12-02 ENCOUNTER — OFFICE VISIT (OUTPATIENT)
Dept: OBSTETRICS AND GYNECOLOGY | Facility: CLINIC | Age: 49
End: 2024-12-02
Payer: MEDICARE

## 2024-12-02 VITALS — BODY MASS INDEX: 26.23 KG/M2 | SYSTOLIC BLOOD PRESSURE: 120 MMHG | HEIGHT: 65 IN | DIASTOLIC BLOOD PRESSURE: 84 MMHG

## 2024-12-02 DIAGNOSIS — L29.2 VULVAR ITCHING: Primary | ICD-10-CM

## 2024-12-02 DIAGNOSIS — K42.9 UMBILICAL HERNIA WITHOUT OBSTRUCTION AND WITHOUT GANGRENE: ICD-10-CM

## 2024-12-02 PROCEDURE — 3008F BODY MASS INDEX DOCD: CPT | Mod: CPTII,S$GLB,, | Performed by: OBSTETRICS & GYNECOLOGY

## 2024-12-02 PROCEDURE — 74176 CT ABD & PELVIS W/O CONTRAST: CPT | Mod: 26,,, | Performed by: RADIOLOGY

## 2024-12-02 PROCEDURE — 1159F MED LIST DOCD IN RCRD: CPT | Mod: CPTII,S$GLB,, | Performed by: OBSTETRICS & GYNECOLOGY

## 2024-12-02 PROCEDURE — 3074F SYST BP LT 130 MM HG: CPT | Mod: CPTII,S$GLB,, | Performed by: OBSTETRICS & GYNECOLOGY

## 2024-12-02 PROCEDURE — 99999 PR PBB SHADOW E&M-EST. PATIENT-LVL III: CPT | Mod: PBBFAC,,, | Performed by: OBSTETRICS & GYNECOLOGY

## 2024-12-02 PROCEDURE — 3079F DIAST BP 80-89 MM HG: CPT | Mod: CPTII,S$GLB,, | Performed by: OBSTETRICS & GYNECOLOGY

## 2024-12-02 PROCEDURE — 3044F HG A1C LEVEL LT 7.0%: CPT | Mod: CPTII,S$GLB,, | Performed by: OBSTETRICS & GYNECOLOGY

## 2024-12-02 PROCEDURE — 74176 CT ABD & PELVIS W/O CONTRAST: CPT | Mod: TC

## 2024-12-02 PROCEDURE — 99214 OFFICE O/P EST MOD 30 MIN: CPT | Mod: S$GLB,,, | Performed by: OBSTETRICS & GYNECOLOGY

## 2024-12-02 RX ORDER — CLOTRIMAZOLE AND BETAMETHASONE DIPROPIONATE 10; .64 MG/G; MG/G
CREAM TOPICAL 2 TIMES DAILY
Qty: 15 G | Refills: 1 | Status: SHIPPED | OUTPATIENT
Start: 2024-12-02 | End: 2024-12-16

## 2024-12-02 NOTE — PRE-PROCEDURE INSTRUCTIONS
Patient reviewed on 12/2/2024.  Okay to proceed at [facility]. The following pre-procedure instructions and arrival time have been reviewed with patient via phone and sent to patient portal for review.  Patient verbalized an understanding.  Pt to be accompanied by father day of procedure as responsible .      Dear Yanique,     Please read over the following pre-procedure instructions in it's entirety as there is helpful information here to get you well prepared for your upcoming procedure.     You are scheduled for a procedure with Dr. Wilkerson on 12/4/2024.     [facility] at the corner of [facility] [address] [facility]. It is in the [facility] [place] next to [employer]. The address is: [address] [place] [address]. Take the elevator to the 2nd floor.       Registration check in time: 09:00 am  Scheduled procedure time: 10:00 am     If you are receiving sedation, you CANNOT drive yourself and must have a responsible friend or family member (no rideshare) to drive you home.        You should take any medications that you routinely take for blood pressure, heart medications, thyroid, cholesterol, etc.      The fasting restrictions are dependent on whether or not you are receiving sedation. Sedation is not available for all procedures.      Your fasting instructions/Sedation type are as follow:  IV sedation.   Nothing to eat after midnight the night prior to procedure.   Patients are encouraged to consume clear liquids up to 2 hours prior to scheduled arrival time.  -Clear liquids include Gatorade, water, soda, black coffee or tea (no milk or creamer), and clear juices. - Clear liquids do NOT include anything with pulp or food particles (chicken broth, ice cream, yogurt, Jello, etc.) You CANNOT drive yourself and must have a .              If you are on blood thinners, you need to follow the anticoagulation instructions that had been discussed previously. You should only stop the blood thinners if  it was approved by your primary care physician or your cardiologist. In the event that you are not able to stop your blood thinners, a blood thinner was not listed on your medication list, or we were not able to get clearance from your cardiologist, then the procedure may have to be postponed/canceled.      IF you were told to stop your blood thinners, this is how long you should generally hold some of the more common ones. Remember that stopping blood thinners is only necessary for certain procedures. If you are unsure of your instructions, please call us.   Aspirin - 5 days  Plavix/Clopidogrel - 7 days  Warfarin / Coumadin - 5 days  Eliquis - 3 days  Pradaxa/Dabigatran - 4 days  Xarelto/Rivaroxaban - 3 days        HOLD all non-insulin injections (shots) until after surgery (Ozempic, Mounjaro, Trulicity, Victoza, Byetta, Wegovy and Adlyxin) (Total of 7 days prior)        If you are a diabetic, do not take your medication if you will be fasting, but bring it with you. Please plan on being here for roughly 2-3 hours.     Please call us if you have been sick (running fever, having any flu-like symptoms) or have been taking ANTIBIOTICS in the past 2 weeks or had any outpatient procedures other than with us (colonoscopy, endoscopy, OBGYN, dental, etc.).      If you have been previously COVID positive, you will need to hold off on your procedure until you are symptom free for 10 days. If you did not have any symptoms, you can have your procedure 10 days from your positive test result.         On the morning of your procedure:  *HOLD ALL VITAMINS, MINERALS, HERBS (INCLUDING HERBAL TEAS) AND SUPPLEMENTS  *SHOWER WITH ANTIBACTERIAL SOAP (EX. DIAL) NIGHT BEFORE AND MORNING OF PROCEDURE  *DO NOT APPLY ANY LOTIONS, OILS, POWDERS, PERFUME/COLOGNE, OINTMENTS, GELS, CREAMS, MAKEUP OR DEODORANT TO YOUR SKIN MORNING OF PROCEDURE  *LEAVE JEWELRY AND ANY VALUABLES AT HOME  *WEAR LOOSE COMFORTABLE CLOTHING         Please reply to this  portal message as receipt of delivery.     Thank you,  [facility]  [place], LPN [facility]  Pre-Admit

## 2024-12-02 NOTE — PROGRESS NOTES
Subjective:       Patient ID: Yanique Mcdermott is a 49 y.o. female.    Chief Complaint:  Ultrasound Follow Up  (US performed for dx ovarian cyst./H/o cysts in past, per pt.)      History of Present Illness  Here for f/u u/s  Had MRI for back pain. Possible ovarian cyst 3.4cm noted on report.   Here today for f/u u/s -   Prelim u/s: normal only small follicle simple seen today     C/o vulvar dryness and itching - thought at first was yeast and did Monistat - still felt dry and has been using sulfur shampoo/ seborrhea shampoo    Hx eczema but no hx of psoriasis    GYN & OB History  No LMP recorded (lmp unknown). Patient is perimenopausal.   Date of Last Pap: 8/22/2024         Objective:     Vitals:    12/02/24 1432   BP: 120/84       Physical Exam  PELVIC:   Normal external genitalia with flaky and scaly skin on bilateral labia majora   Adequate perineal body, normal urethral meatus. No signif cystocele or rectocele.  Vagina moist and well rugated without lesions or discharge.    Cervix pink, without lesions, discharge or tenderness. No CMT   Bimanual exam shows uterus to be normal size, regular, mobile and nontender.    Adnexa without masses or tenderness.       Assessment/ Plan:     Orders Placed This Encounter    clotrimazole-betamethasone 1-0.05% (LOTRISONE) cream       Yanique was seen today for ultrasound follow up .    Diagnoses and all orders for this visit:    Vulvar itching with dry flasky skin   Stop topical sulfur and products   Use steroid cream for 2 weeks  Add moisturizer like coconut oil or vulvar balm   F/u 2 weeks to reassess - consider estrogen cream then once healed   If not better consider bx  -     clotrimazole-betamethasone 1-0.05% (LOTRISONE) cream; Apply topically 2 (two) times daily. for 14 days        Follow up in about 2 weeks (around 12/16/2024) for medication followup.      Health Maintenance         Date Due Completion Date    Pneumococcal Vaccines (Age 0-64) (1 of 2 - PCV) Never done  ---    TETANUS VACCINE Never done ---    Shingles Vaccine (1 of 2) Never done ---    Influenza Vaccine (1) Never done ---    Mammogram 10/22/2025 10/22/2024    Colorectal Cancer Screening 02/23/2027 2/23/2024    Hemoglobin A1c (Diabetic Prevention Screening) 08/15/2027 8/15/2024    Cervical Cancer Screening 08/14/2029 8/14/2024    Lipid Panel 08/15/2029 8/15/2024    RSV Vaccine (Age 60+ and Pregnant patients) (1 - 1-dose 75+ series) 06/20/2048 ---            Answers submitted by the patient for this visit:  Gynecologic Exam Questionnaire  (Submitted on 11/25/2024)  Chief Complaint: Gynecologic exam  genital itching: Yes  pelvic pain: Yes  Chronicity: recurrent  Onset: more than 1 year ago  Frequency: intermittently  Progression since onset: unchanged  Pain severity: moderate  Affected side: both  Pregnant now?: No  abdominal pain: Yes  anorexia: No  back pain: Yes  chills: No  constipation: Yes  discolored urine: No  dysuria: No  fever: No  flank pain: Yes  frequency: No  headaches: No  hematuria: No  nausea: No  painful intercourse: No  rash: No  urgency: No  vomiting: No  Please select the characteristics of your discharge: : clear  Vaginal bleeding: heavier than menses  Passing clots?: No  Passing tissue?: No  Aggravated by: nothing  treatments tried: acetaminophen, anti-fungal cream, heating pad, NSAIDs  Improvement on treatment: moderate  Sexual activity: sexually active  Partner with STD symptoms: no  Birth control: condoms  Menstrual history: irregular  abdominal surgery: Yes  Endometriosis: Yes  gynecological surgery: Yes  miscarriage: Yes  ovarian cysts: Yes

## 2024-12-03 ENCOUNTER — OFFICE VISIT (OUTPATIENT)
Dept: SURGERY | Facility: CLINIC | Age: 49
End: 2024-12-03
Payer: MEDICARE

## 2024-12-03 DIAGNOSIS — K42.9 UMBILICAL HERNIA WITHOUT OBSTRUCTION AND WITHOUT GANGRENE: Primary | ICD-10-CM

## 2024-12-03 NOTE — PROGRESS NOTES
Established Patient - Audio Only Telehealth Visit     The patient location is: [place]  The chief complaint leading to consultation is: umbilical bulge  Visit type: Virtual visit with audio only (telephone)  Total time spent with patient: 5min       The reason for the audio only service rather than synchronous audio and video virtual visit was related to technical difficulties or patient preference/necessity.     Each patient to whom I provide medical services by telemedicine is:  (1) informed of the relationship between the physician and patient and the respective role of any other health care provider with respect to management of the patient; and (2) notified that they may decline to receive medical services by telemedicine and may withdraw from such care at any time. Patient verbally consented to receive this service via voice-only telephone call.       HPI:   49F with umbilical bulge  Feeling about the same  Has what appears to be diastasis as well as small UH.   Feels that diastasis is contributing to her back pain  CT reviewed. Small UH. No epigastric hernia noted     Assessment and plan:    49F with UH  She wants to talk to plastics about anything that can be done regarding diastasis  Will let us know if there is anything we can do  RTC prn                        This service was not originating from a related E/M service provided within the previous 7 days nor will  to an E/M service or procedure within the next 24 hours or my soonest available appointment.  Prevailing standard of care was able to be met in this audio-only visit.

## 2024-12-04 ENCOUNTER — HOSPITAL ENCOUNTER (OUTPATIENT)
Facility: HOSPITAL | Age: 49
Discharge: HOME | End: 2024-12-04
Attending: STUDENT IN AN ORGANIZED HEALTH CARE EDUCATION/TRAINING PROGRAM | Admitting: STUDENT IN AN ORGANIZED HEALTH CARE EDUCATION/TRAINING PROGRAM
Payer: MEDICARE

## 2024-12-04 VITALS
SYSTOLIC BLOOD PRESSURE: 102 MMHG | BODY MASS INDEX: 26.46 KG/M2 | WEIGHT: 155 LBS | OXYGEN SATURATION: 100 % | DIASTOLIC BLOOD PRESSURE: 65 MMHG | HEART RATE: 74 BPM | RESPIRATION RATE: 16 BRPM | TEMPERATURE: 98 F | HEIGHT: 64 IN

## 2024-12-04 DIAGNOSIS — G89.29 CHRONIC PAIN: ICD-10-CM

## 2024-12-04 DIAGNOSIS — M46.1 SACROILIITIS: Primary | ICD-10-CM

## 2024-12-04 LAB
B-HCG UR QL: NEGATIVE
CTP QC/QA: YES

## 2024-12-04 PROCEDURE — 27096 INJECT SACROILIAC JOINT: CPT | Mod: 50 | Performed by: STUDENT IN AN ORGANIZED HEALTH CARE EDUCATION/TRAINING PROGRAM

## 2024-12-04 PROCEDURE — 63600175 PHARM REV CODE 636 W HCPCS: Performed by: STUDENT IN AN ORGANIZED HEALTH CARE EDUCATION/TRAINING PROGRAM

## 2024-12-04 PROCEDURE — 25500020 PHARM REV CODE 255: Performed by: STUDENT IN AN ORGANIZED HEALTH CARE EDUCATION/TRAINING PROGRAM

## 2024-12-04 PROCEDURE — 27096 INJECT SACROILIAC JOINT: CPT | Mod: 50,,, | Performed by: STUDENT IN AN ORGANIZED HEALTH CARE EDUCATION/TRAINING PROGRAM

## 2024-12-04 PROCEDURE — 81025 URINE PREGNANCY TEST: CPT | Performed by: STUDENT IN AN ORGANIZED HEALTH CARE EDUCATION/TRAINING PROGRAM

## 2024-12-04 RX ORDER — MIDAZOLAM HYDROCHLORIDE 1 MG/ML
INJECTION INTRAMUSCULAR; INTRAVENOUS
Status: DISCONTINUED | OUTPATIENT
Start: 2024-12-04 | End: 2024-12-04 | Stop reason: HOSPADM

## 2024-12-04 RX ORDER — FENTANYL CITRATE 50 UG/ML
INJECTION, SOLUTION INTRAMUSCULAR; INTRAVENOUS
Status: DISCONTINUED | OUTPATIENT
Start: 2024-12-04 | End: 2024-12-04 | Stop reason: HOSPADM

## 2024-12-04 RX ORDER — ALPRAZOLAM 0.5 MG/1
0.5 TABLET, ORALLY DISINTEGRATING ORAL
Status: DISCONTINUED | OUTPATIENT
Start: 2024-12-04 | End: 2024-12-04

## 2024-12-04 RX ORDER — TRIAMCINOLONE ACETONIDE 40 MG/ML
INJECTION, SUSPENSION INTRA-ARTICULAR; INTRAMUSCULAR
Status: DISCONTINUED | OUTPATIENT
Start: 2024-12-04 | End: 2024-12-04 | Stop reason: HOSPADM

## 2024-12-04 RX ORDER — LIDOCAINE HYDROCHLORIDE 20 MG/ML
INJECTION, SOLUTION EPIDURAL; INFILTRATION; INTRACAUDAL; PERINEURAL
Status: DISCONTINUED | OUTPATIENT
Start: 2024-12-04 | End: 2024-12-04 | Stop reason: HOSPADM

## 2024-12-04 RX ORDER — ALPRAZOLAM 0.5 MG/1
0.5 TABLET, ORALLY DISINTEGRATING ORAL
Status: DISCONTINUED | OUTPATIENT
Start: 2024-12-04 | End: 2024-12-04 | Stop reason: HOSPADM

## 2024-12-04 RX ORDER — BUPIVACAINE HYDROCHLORIDE 2.5 MG/ML
INJECTION, SOLUTION EPIDURAL; INFILTRATION; INTRACAUDAL
Status: DISCONTINUED | OUTPATIENT
Start: 2024-12-04 | End: 2024-12-04 | Stop reason: HOSPADM

## 2024-12-04 NOTE — DISCHARGE INSTRUCTIONS
[facility] [place]  Dr. Enid Wilkerson  Messaging service # [phone]    POST-PROCEDURE INSTRUCTIONS:    Today you had an injection that included a steroid medications.  The steroid may or may not have been mixed with a local anesthetic when it was injected.   If the injection was in the neck, you may feel some pressure, numbness, or slight weakness in the arm after the procedure for a short period of time (this is a normal response), if this persists for longer than 1 day please contact our office or go to the emergency room.  If the injection was in the low back, you may feel some pressure, numbness, or slight weakness in the leg after the procedure for a short period of time (this is a normal response), if this persists for longer than 1 day please contact our office or go to the emergency room.  You may get side effects from the steroid.  This is not uncommon.  Symptoms include: elevated blood sugar, elevated blood pressure, headache, flushing, nausea, insomnia.  These symptoms are transient and will resolve within 1-3 days.  If symptoms last longer than this please contact our office or head to the emergency room.  Steroid medications can take anywhere from 3-14 days to take effect (rarely longer).  You may notice that your pain worsens for a short period of time after the injection, this would not be unusual due to the pressure and trauma from the needle.    If you do not have a follow up appointment scheduled, please contact my office (or the office of the physician who referred you for the procedure) to get a post-procedure follow up scheduled 2-4 weeks after the procedure.  This can be done as a virtual visit if that is more convenient for you.      What you need to do:    Keep a record of your response to the injection you had today.    How much relief did you get?   When did the relief start and how long did it last?  Were you able to decrease the use of any of your pain  medications?  Were you able to increase your level of activity?  How long did the relief last?    What to watch out for:    If you experience any of the following symptoms after your procedure, please notify the messaging service immediately (see above for contact information):   fever (increased oral temperature)   bleeding or swelling at the injection site,    drainage, rash or redness at the injection site    possible signs of infection    increased pain at the injection site   worsening of your usual pain   severe headache   new or worsening numbness    new arm and/or leg weakness, or    changes in bowel and/or bladder function: urinating or defecating on yourself and not knowing that you did it.    PLEASE FOLLOW ALL INSTRUCTIONS CAREFULLY     Do not engage in strenuous activity (e.g., lifting or pushing heavy objects or repeated bending) for 24 hours.     Do not take a bath, swim or use Jacuzzi for 24 hours after procedure. (A shower is fine).   Remove any Band-Aids when you get home.    Use cold/ice, as needed for comfort.  We recommend the use of cold therapy alternating on for 20 minutes, off for 20 minutes.    Do not apply direct heat (heating pad or heat packs) to the injection site for 24 hours.     Resume your usual medications, unless instructed otherwise by your Pain Physician.     If you are on warfarin (Coumadin) or other blood thinner, resume this medication as instructed by your prescribing Physician.    IF AT ANY POINT YOU ARE VERY CONCERNED ABOUT YOUR SYMPTOMS, PLEASE GO TO THE EMERGENCY ROOM.    If you develop worsening pain, weakness, numbness, lose bowel or bladder control (i.e., having an accident where you did not even know you had to go to the bathroom and suddenly noticed you soiled yourself), saddle anesthesia (a loss of sensation restricted to the area of the buttocks, anus and between the legs -- i.e., those parts of your body that would touch a saddle if you were sitting on one) you  need to go immediately to the emergency department for evaluation and treatment.    ----------------------------------------------------------------------------------------------------------------------------------------------------------------  If you received Sedation please read the following instructions:  POST SEDATION INSTRUCTIONS    Today you received intravenous medication (also known as sedation) that was used to help you relax and/or decrease discomfort during your procedure. This medication will be acting in your body for the next 24 hours, so you might feel a little tired or sleepy. This feeling will slowly wear off.   Common side effects associated with these medications include: drowsiness, dizziness, sleepiness, confusion, feeling excited, difficulty remembering things, lack of steadiness with walking or balance, loss of fine muscle control, slowed reflexes, difficulty focusing, and blurred vision.  Some over-the-counter and prescription medications (e.g., muscle relaxants, opioids, mood-altering medications, sedatives/hypnotics, antihistamines) can interact with the intravenous medication you received and cause an increased risk of the side effects listed above in addition to other potentially life threatening side effects. Use extreme caution if you are taking such medications, and consult with your Pain Physician or prescribing physician if you have any questions.  For the next 12-24 hours:    DO NOT--Drive a car, operate machinery or power tools   DO NOT--Drink any alcoholic beverages (not even beer), they may dangerously increase the risk of side effects.    DO NOT--Make any important legal or business decisions or sign important documents.  We advise you to have someone to assist you at home. Move slowly and carefully. Do not make sudden changes in position. Be aware of dizziness or light-headedness and move accordingly.   If you seek medical treatment within 24 hours, let the nurse or doctor  caring for you know that you have received the above medications. If you have any questions or concerns related to your sedation or treatment today please contact us.

## 2024-12-04 NOTE — PLAN OF CARE
Pt in preop bay 21, VSS, meds given and IV inserted. Pt denies any open wounds on body or the use of any immunizations or antibiotics in the past 2 weeks. Pt needs site marking and consents, otherwise ready to roll.

## 2024-12-04 NOTE — OP NOTE
Sacroiliac Joint Injection under Fluoroscopic Guidance    The procedure, risks, benefits, and options were discussed with the patient. There are no contraindications to the procedure. The patent expressed understanding and agreed to the procedure. Informed written consent was obtained prior to the start of the procedure and can be found in the patient's chart.    PATIENT NAME: Yanique Mcdermott   MRN: [id number]     DATE OF PROCEDURE: 12/04/2024    PROCEDURE: Bilateral Sacroiliac Joint Injection under Fluoroscopic Guidance    PRE-OP DIAGNOSIS: Sacroiliitis [M46.1]    POST-OP DIAGNOSIS: Same    PHYSICIAN: Enid Wilkerson DO    ASSISTANTS: None     MEDICATIONS INJECTED: Preservative-free Kenalog 40mg with 7 cc of Bupivacine 0.25%     LOCAL ANESTHETIC INJECTED: Xylocaine 2%     SEDATION: Versed 2mg and Fentanyl 50mcg                                                                                                                                                                                     Conscious sedation ordered by M.D. Patient re-evaluation prior to administration of conscious sedation. No changes noted in patient's status from initial evaluation. The patient's vital signs were monitored by RN and patient remained hemodynamically stable throughout the procedure.    Event Time In   Sedation Start 1005   Sedation End 1009       ESTIMATED BLOOD LOSS: None    COMPLICATIONS: None    TECHNIQUE: Time-out was performed to identify the patient and procedure to be performed. With the patient laying in a prone position, the surgical area was prepped and draped in the usual sterile fashion using ChloraPrep and a fenestrated drape. The sacroiliac joint was determined under fluoroscopy guidance. Skin anesthesia was achieved by injecting Lidocaine 2% over the injection site. The sacroiliac joint was  then approached with a 22 gauge, 3.5 inch spinal quinke needle that was introduced under fluoroscopic guidance in the AP and  Lateral views. Once the needle tip was in the area of the joint, and there was no blood, contrast dye Omnipaque (300mg/mL) was injected to confirm placement and there was no vascular runoff. Fluoroscopic imaging in the AP and lateral views revealed a clear outline of the joint space. 4 mL of the medication mixture listed above was injected slowly intraarticular and roddy-articular. Displacement of the radio opaque contrast after injection of the medication confirmed that the medication went into the area of the joint. The needles were removed and bleeding was nil. The same procedure was repeated on the contralateral side. A sterile dressing was applied. No specimens collected. The patient tolerated the procedure well.       The patient was monitored after the procedure in the recovery area. They were given post-procedure and discharge instructions to follow at home. The patient was discharged in a stable condition.    Enid Wilkerson DO

## 2024-12-04 NOTE — DISCHARGE SUMMARY
Discharge Note  Short Stay      SUMMARY     Admit Date: 12/4/2024    Attending Physician: Enid Wilkerson      Discharge Physician: Enid Wilkerson      Discharge Date: 12/4/2024 10:11 AM    Procedure(s) (LRB):  bilat SI Joint injeciton (Bilateral)    Final Diagnosis: Sacroiliitis [M46.1]    Disposition: Home or self care    Patient Instructions:   Current Discharge Medication List        CONTINUE these medications which have NOT CHANGED    Details   CMPD testosterone proprionate 2% in vanicream Apply one click daily to inner thigh or labia nightly  Qty: 60 g, Refills: 5    Associated Diagnoses: Decreased libido      DULoxetine (CYMBALTA) 60 MG capsule Take 1 capsule (60 mg total) by mouth once daily.  Qty: 90 capsule, Refills: 3    Associated Diagnoses: Recurrent major depressive disorder, in partial remission      fluticasone propionate (FLONASE) 50 mcg/actuation nasal spray USE 1 SPRAY IN EACH NOSTRIL ONCE DAILY  Qty: 16 g, Refills: 2      ibuprofen (ADVIL,MOTRIN) 800 MG tablet Take 800 mg by mouth.      meloxicam (MOBIC) 15 MG tablet TAKE ONE TABLET BY MOUTH EVERY DAY for 90      metoprolol succinate (TOPROL-XL) 25 MG 24 hr tablet Take 1 tablet (25 mg total) by mouth once daily.  Qty: 90 tablet, Refills: 3      omega 3-dha-epa-fish oil 1,200 (144-216) mg Cap Take 1 capsule by mouth once daily.    Associated Diagnoses: Hepatic steatosis      progesterone (PROMETRIUM) 100 MG capsule Take 1 capsule (100 mg total) by mouth once daily.  Qty: 90 capsule, Refills: 3    Associated Diagnoses: Breast pain; Night sweats; Insomnia, unspecified type      SYNTHROID 50 mcg tablet Take 1 tablet (50 mcg total) by mouth before breakfast.  Qty: 90 tablet, Refills: 3    Comments: Brand name Synthroid only  Associated Diagnoses: Postoperative hypothyroidism      vitamin E 400 UNIT capsule Take 1 capsule (400 Units total) by mouth once daily.    Associated Diagnoses: Hepatic steatosis      cholecalciferol, vitamin D3, 1,250 mcg  (50,000 unit) capsule Take 1 capsule (50,000 Units total) by mouth every 14 (fourteen) days.  Qty: 6 capsule, Refills: 3    Associated Diagnoses: Vitamin D deficiency      clotrimazole-betamethasone 1-0.05% (LOTRISONE) cream Apply topically 2 (two) times daily. for 14 days  Qty: 15 g, Refills: 1    Associated Diagnoses: Vulvar itching      diclofenac (VOLTAREN) 75 MG EC tablet 1 tablet as needed Oral Twice a day for 30 days                 Discharge Diagnosis: Sacroiliitis [M46.1]  Condition on Discharge: Stable with no complications to procedure   Diet on Discharge: Same as before.  Activity: as per instruction sheet.  Discharge to: Home with a responsible adult.  Follow up: 2-4 weeks       Please call my office or pager at [phone] if experienced any weakness or loss of sensation, fever > 101.5, pain uncontrolled with oral medications, persistent nausea/vomiting/or diarrhea, redness or drainage from the incisions, or any other worrisome concerns. If physician on call was not reached or could not communicate with our office for any reason please go to the nearest emergency department

## 2024-12-04 NOTE — H&P
HPI  Patient presenting for Procedure(s) (LRB):  bilat SI Joint injeciton (Bilateral)     Patient on Anti-coagulation No    No health changes since previous encounter    Past Medical History:   Diagnosis Date    Abnormal Pap smear of cervix 2010    Hpv (Dr Benitez)    Calculus of kidney 06/03/2024    Chronic bilateral low back pain without sciatica 09/26/2022    Fibromyalgia     Generalized anxiety disorder 06/03/2024    Hepatic steatosis 06/04/2024    History of endometriosis 02/20/2018    HPV (human papilloma virus) infection 2010    LEEP Done 5-24-12 (Dr Benitez)    Hx of thyroid nodule 1999    Mononucleosis     Pelvic floor dysfunction 09/27/2022    Postoperative hypothyroidism 09/10/2022    Recurrent major depressive disorder, in partial remission 06/03/2024    Stress incontinence 09/27/2022    Tobacco use      Past Surgical History:   Procedure Laterality Date    AUGMENTATION OF BREAST Bilateral 1995    BREAST AUGMENTATION  1994    CERVICAL BIOPSY  W/ LOOP ELECTRODE EXCISION  05/24/2012    Dr Benitez    INJECTION, SACROILIAC JOINT Bilateral 09/14/2022    Procedure: INJECTION,SACROILIAC JOINT bilateral;  Surgeon: Rica Gonzalez MD;  Location: [facility];  Service: Pain Management;  Laterality: Bilateral;    PELVIC LAPAROSCOPY  2000, 2017    THYROIDECTOMY, PARTIAL  1999    Herthel cell    WISDOM TOOTH EXTRACTION  1990     Review of patient's allergies indicates:   Allergen Reactions    Minocin [minocycline] Swelling and Other (See Comments)     And Severe Joint Pain  Able to take doxycycline.    Bactrim [sulfamethoxazole-trimethoprim] Other (See Comments)     Black/dark color to tongue while taking medication (discoloration only - pt verbally stated that she has taken it before and could take it if needed)      Current Facility-Administered Medications   Medication    alprazolam ODT dissolvable tablet 0.5 mg     Current Outpatient Medications   Medication Sig    CMPD testosterone proprionate 2% in vanicream Apply one  "click daily to inner thigh or labia nightly    DULoxetine (CYMBALTA) 60 MG capsule Take 1 capsule (60 mg total) by mouth once daily.    fluticasone propionate (FLONASE) 50 mcg/actuation nasal spray USE 1 SPRAY IN EACH NOSTRIL ONCE DAILY    ibuprofen (ADVIL,MOTRIN) 800 MG tablet Take 800 mg by mouth.    meloxicam (MOBIC) 15 MG tablet TAKE ONE TABLET BY MOUTH EVERY DAY for 90    metoprolol succinate (TOPROL-XL) 25 MG 24 hr tablet Take 1 tablet (25 mg total) by mouth once daily.    omega 3-dha-epa-fish oil 1,200 (144-216) mg Cap Take 1 capsule by mouth once daily.    progesterone (PROMETRIUM) 100 MG capsule Take 1 capsule (100 mg total) by mouth once daily.    SYNTHROID 50 mcg tablet Take 1 tablet (50 mcg total) by mouth before breakfast.    vitamin E 400 UNIT capsule Take 1 capsule (400 Units total) by mouth once daily.    cholecalciferol, vitamin D3, 1,250 mcg (50,000 unit) capsule Take 1 capsule (50,000 Units total) by mouth every 14 (fourteen) days.    clotrimazole-betamethasone 1-0.05% (LOTRISONE) cream Apply topically 2 (two) times daily. for 14 days    diclofenac (VOLTAREN) 75 MG EC tablet 1 tablet as needed Oral Twice a day for 30 days       PMHx, PSHx, Allergies, Medications reviewed in epic    ROS negative except pain complaints in HPI    OBJECTIVE:    /65   Pulse 74   Temp 98.2 °F (36.8 °C) (Temporal)   Resp 16   Ht 5' 4" (1.626 m)   Wt 70.3 kg (155 lb)   SpO2 100%   Breastfeeding No   BMI 26.61 kg/m²     PHYSICAL EXAMINATION:    GENERAL: Well appearing, in no acute distress, alert and oriented x3.  PSYCH:  Mood and affect appropriate.  SKIN: Skin color, texture, turgor normal, no rashes or lesions which will impact the procedure.  CV: RRR with palpation of the radial artery.  PULM: No evidence of respiratory difficulty, symmetric chest rise. Clear to auscultation.  NEURO: Cranial nerves grossly intact.    Plan:    Proceed with procedure as planned Procedure(s) (LRB):  bilat SI Joint injeciton " (Bilateral)    Enid Wilkerson  12/04/2024

## 2025-01-28 ENCOUNTER — TELEPHONE (OUTPATIENT)
Dept: OBSTETRICS AND GYNECOLOGY | Facility: CLINIC | Age: 50
End: 2025-01-28
Payer: MEDICARE

## 2025-01-28 DIAGNOSIS — R10.2 PELVIC PAIN: Primary | ICD-10-CM

## 2025-01-28 NOTE — TELEPHONE ENCOUNTER
1/29/25 @ 9322 Called pt scheduled visit with Dr Benitez and ultrasound for 2/25 at 1100. Pt vu      Per phone staff:  Bone pt called in states she is having painful discomfort in pelvic area pt would like a call to discuss     1/28/25 @ 1578 Returned pt's call. Pt states she was seen not that long ago received some cream for vaginal itching was given cream and that has resolved, she reports currently she is having soreness and cramping during her cycles. She reports this has been going on over the last two cycles. Pt reports hx of ovarian cyst, endometriosis. States at her last visit US and CT scan both note ovarian cyst. When asked initially by Dr Benitez she was not having any pain, but since then she has been experiencing  pelvic pain during periods with an increase during her cycles..  She has been taking tylenol arthritis, 800 mg of Ibuprofen, Rx'd Tramadol for pain once daily for her back, the pain resolves with medications, but cramping returns, last period was very heavy with clots, over all pelvic are feels sore. Denies constipation, issues with bowel movements, denies urinary symptoms as well. Will speak with Dr Benitez and follow up with next steps. Pt is schedule for a back surgery this Thursday. Pt vu

## 2025-01-28 NOTE — TELEPHONE ENCOUNTER
Had follicle on right ovary in Dec - Normal  Lets let her do back surgery on thurs and sched her to come in as soon as she thinks she can come in 2-3 weeks? For repeat u/s and eval/ exam

## 2025-03-21 RX ORDER — METOPROLOL SUCCINATE 25 MG/1
25 TABLET, EXTENDED RELEASE ORAL
Qty: 90 TABLET | Refills: 3 | Status: SHIPPED | OUTPATIENT
Start: 2025-03-21

## 2025-05-16 DIAGNOSIS — N64.4 BREAST PAIN: ICD-10-CM

## 2025-05-16 DIAGNOSIS — R61 NIGHT SWEATS: ICD-10-CM

## 2025-05-16 DIAGNOSIS — G47.00 INSOMNIA, UNSPECIFIED TYPE: ICD-10-CM

## 2025-05-16 RX ORDER — PROGESTERONE 100 MG/1
100 CAPSULE ORAL
Qty: 90 CAPSULE | Refills: 1 | Status: SHIPPED | OUTPATIENT
Start: 2025-05-16

## 2025-05-16 NOTE — TELEPHONE ENCOUNTER
Refill Decision Note   Yanique Mcdermott  is requesting a refill authorization.  Brief Assessment and Rationale for Refill:  Approve     Medication Therapy Plan:        Comments:     Note composed:6:37 AM 05/16/2025

## (undated) DEVICE — CLOSURE SKIN STERI STRIP 1/2X4

## (undated) DEVICE — GLOVE BIOGEL SKINSENSE PI 6.5

## (undated) DEVICE — PAD ABD 8X10 STERILE

## (undated) DEVICE — SUT MCRYL PLUS 4-0 PS2 27IN

## (undated) DEVICE — NDL INSUF ULTRA VERESS 120MM

## (undated) DEVICE — JELLY KY LUBRICATING 5G PACKET

## (undated) DEVICE — IRRIGATOR ENDOSCOPY DISP.

## (undated) DEVICE — KIT WING PAD POSITIONING

## (undated) DEVICE — ELECTRODE REM PLYHSV RETURN 9

## (undated) DEVICE — SOL CLEARIFY VISUALIZATION LAP

## (undated) DEVICE — SOL NS 1000CC

## (undated) DEVICE — PACK LAPAROSCOPY BAPTIST

## (undated) DEVICE — TROCAR ENDOPATH XCEL 5X100MM

## (undated) DEVICE — DRESSING LEUKOPLAST FLEX 1X3IN

## (undated) DEVICE — CANNULA ENDOPATH XCEL 5X100MM

## (undated) DEVICE — SOL 9P NACL IRR PIC IL